# Patient Record
Sex: MALE | Race: WHITE | Employment: OTHER | URBAN - METROPOLITAN AREA
[De-identification: names, ages, dates, MRNs, and addresses within clinical notes are randomized per-mention and may not be internally consistent; named-entity substitution may affect disease eponyms.]

---

## 2017-01-03 ENCOUNTER — APPOINTMENT (OUTPATIENT)
Dept: LAB | Facility: HOSPITAL | Age: 72
End: 2017-01-03
Attending: INTERNAL MEDICINE
Payer: MEDICARE

## 2017-01-03 DIAGNOSIS — E78.6 LIPOPROTEIN DEFICIENCIES: ICD-10-CM

## 2017-01-03 DIAGNOSIS — I10 ESSENTIAL HYPERTENSION, MALIGNANT: ICD-10-CM

## 2017-01-03 DIAGNOSIS — R53.81 OTHER MALAISE AND FATIGUE: ICD-10-CM

## 2017-01-03 DIAGNOSIS — I48.91 ATRIAL FIBRILLATION (HCC): ICD-10-CM

## 2017-01-03 DIAGNOSIS — R53.83 OTHER MALAISE AND FATIGUE: ICD-10-CM

## 2017-01-03 DIAGNOSIS — Z85.46 PERSONAL HISTORY OF MALIGNANT NEOPLASM OF PROSTATE: ICD-10-CM

## 2017-01-03 DIAGNOSIS — E11.9 TYPE II OR UNSPECIFIED TYPE DIABETES MELLITUS WITHOUT MENTION OF COMPLICATION, NOT STATED AS UNCONTROLLED: ICD-10-CM

## 2017-01-03 LAB
INR BLD: 2 (ref 0.9–1.2)
PROTHROMBIN TIME: 22.1 SECONDS (ref 11.8–14.5)

## 2017-01-03 PROCEDURE — 85610 PROTHROMBIN TIME: CPT

## 2017-01-03 PROCEDURE — 36415 COLL VENOUS BLD VENIPUNCTURE: CPT

## 2017-02-06 ENCOUNTER — LAB ENCOUNTER (OUTPATIENT)
Dept: LAB | Facility: HOSPITAL | Age: 72
End: 2017-02-06
Attending: INTERNAL MEDICINE
Payer: MEDICARE

## 2017-02-06 DIAGNOSIS — I48.92 ATRIAL FLUTTER (HCC): ICD-10-CM

## 2017-02-06 DIAGNOSIS — I48.91 ATRIAL FIBRILLATION (HCC): Primary | ICD-10-CM

## 2017-02-06 LAB
INR BLD: 2 (ref 0.9–1.2)
PROTHROMBIN TIME: 22.5 SECONDS (ref 11.8–14.5)

## 2017-02-06 PROCEDURE — 36415 COLL VENOUS BLD VENIPUNCTURE: CPT

## 2017-02-06 PROCEDURE — 85610 PROTHROMBIN TIME: CPT

## 2017-02-28 ENCOUNTER — PRIOR ORIGINAL RECORDS (OUTPATIENT)
Dept: OTHER | Age: 72
End: 2017-02-28

## 2017-02-28 ENCOUNTER — APPOINTMENT (OUTPATIENT)
Dept: LAB | Facility: HOSPITAL | Age: 72
End: 2017-02-28
Attending: INTERNAL MEDICINE
Payer: MEDICARE

## 2017-02-28 DIAGNOSIS — I48.91 ATRIAL FIBRILLATION (HCC): ICD-10-CM

## 2017-02-28 DIAGNOSIS — Z85.46 PERSONAL HISTORY OF MALIGNANT NEOPLASM OF PROSTATE: ICD-10-CM

## 2017-02-28 DIAGNOSIS — R53.83 OTHER MALAISE AND FATIGUE: ICD-10-CM

## 2017-02-28 DIAGNOSIS — R53.81 OTHER MALAISE AND FATIGUE: ICD-10-CM

## 2017-02-28 DIAGNOSIS — E78.6 LIPOPROTEIN DEFICIENCY DISORDER: ICD-10-CM

## 2017-02-28 DIAGNOSIS — I10 ESSENTIAL HYPERTENSION, MALIGNANT: ICD-10-CM

## 2017-02-28 DIAGNOSIS — E11.9 TYPE II OR UNSPECIFIED TYPE DIABETES MELLITUS WITHOUT MENTION OF COMPLICATION, NOT STATED AS UNCONTROLLED: ICD-10-CM

## 2017-02-28 LAB
INR BLD: 2.2 (ref 0.9–1.2)
PROTHROMBIN TIME: 24.4 SECONDS (ref 11.8–14.5)

## 2017-02-28 PROCEDURE — 85610 PROTHROMBIN TIME: CPT

## 2017-02-28 PROCEDURE — 36415 COLL VENOUS BLD VENIPUNCTURE: CPT

## 2017-03-30 ENCOUNTER — LAB ENCOUNTER (OUTPATIENT)
Dept: LAB | Facility: HOSPITAL | Age: 72
End: 2017-03-30
Attending: UROLOGY
Payer: MEDICARE

## 2017-03-30 DIAGNOSIS — I48.91 ATRIAL FIBRILLATION (HCC): ICD-10-CM

## 2017-03-30 DIAGNOSIS — E11.9 TYPE II OR UNSPECIFIED TYPE DIABETES MELLITUS WITHOUT MENTION OF COMPLICATION, NOT STATED AS UNCONTROLLED: ICD-10-CM

## 2017-03-30 DIAGNOSIS — R53.81 OTHER MALAISE AND FATIGUE: ICD-10-CM

## 2017-03-30 DIAGNOSIS — I10 ESSENTIAL HYPERTENSION, MALIGNANT: ICD-10-CM

## 2017-03-30 DIAGNOSIS — R53.83 OTHER MALAISE AND FATIGUE: ICD-10-CM

## 2017-03-30 DIAGNOSIS — Z85.46 PERSONAL HISTORY OF MALIGNANT NEOPLASM OF PROSTATE: ICD-10-CM

## 2017-03-30 DIAGNOSIS — E78.6 LIPOPROTEIN DEFICIENCY DISORDER: ICD-10-CM

## 2017-03-30 DIAGNOSIS — C61 PROSTATE CANCER (HCC): Primary | ICD-10-CM

## 2017-03-30 PROCEDURE — 84153 ASSAY OF PSA TOTAL: CPT

## 2017-03-30 PROCEDURE — 85610 PROTHROMBIN TIME: CPT

## 2017-03-30 PROCEDURE — 36415 COLL VENOUS BLD VENIPUNCTURE: CPT

## 2017-05-04 ENCOUNTER — LAB ENCOUNTER (OUTPATIENT)
Dept: LAB | Facility: HOSPITAL | Age: 72
End: 2017-05-04
Attending: INTERNAL MEDICINE
Payer: MEDICARE

## 2017-05-04 DIAGNOSIS — I48.91 ATRIAL FIBRILLATION (HCC): Primary | ICD-10-CM

## 2017-05-04 PROCEDURE — 36415 COLL VENOUS BLD VENIPUNCTURE: CPT

## 2017-05-04 PROCEDURE — 85610 PROTHROMBIN TIME: CPT

## 2017-06-01 ENCOUNTER — APPOINTMENT (OUTPATIENT)
Dept: LAB | Facility: HOSPITAL | Age: 72
End: 2017-06-01
Attending: INTERNAL MEDICINE
Payer: MEDICARE

## 2017-06-01 ENCOUNTER — PRIOR ORIGINAL RECORDS (OUTPATIENT)
Dept: OTHER | Age: 72
End: 2017-06-01

## 2017-06-01 DIAGNOSIS — I10 ESSENTIAL HYPERTENSION: ICD-10-CM

## 2017-06-01 DIAGNOSIS — I48.91 ATRIAL FIBRILLATION (HCC): ICD-10-CM

## 2017-06-01 DIAGNOSIS — E13.9 DIABETES 1.5, MANAGED AS TYPE 2 (HCC): ICD-10-CM

## 2017-06-01 PROCEDURE — 80076 HEPATIC FUNCTION PANEL: CPT

## 2017-06-01 PROCEDURE — 82570 ASSAY OF URINE CREATININE: CPT

## 2017-06-01 PROCEDURE — 83036 HEMOGLOBIN GLYCOSYLATED A1C: CPT

## 2017-06-01 PROCEDURE — 80069 RENAL FUNCTION PANEL: CPT

## 2017-06-01 PROCEDURE — 85610 PROTHROMBIN TIME: CPT

## 2017-06-01 PROCEDURE — 80061 LIPID PANEL: CPT

## 2017-06-01 PROCEDURE — 82043 UR ALBUMIN QUANTITATIVE: CPT

## 2017-06-01 PROCEDURE — 36415 COLL VENOUS BLD VENIPUNCTURE: CPT

## 2017-06-05 ENCOUNTER — APPOINTMENT (OUTPATIENT)
Dept: CT IMAGING | Facility: HOSPITAL | Age: 72
End: 2017-06-05
Attending: EMERGENCY MEDICINE
Payer: MEDICARE

## 2017-06-05 ENCOUNTER — HOSPITAL ENCOUNTER (EMERGENCY)
Facility: HOSPITAL | Age: 72
Discharge: HOME | End: 2017-06-05
Attending: EMERGENCY MEDICINE
Payer: MEDICARE

## 2017-06-05 VITALS
HEIGHT: 68 IN | OXYGEN SATURATION: 96 % | WEIGHT: 230 LBS | BODY MASS INDEX: 34.86 KG/M2 | TEMPERATURE: 98 F | HEART RATE: 67 BPM | RESPIRATION RATE: 19 BRPM | DIASTOLIC BLOOD PRESSURE: 81 MMHG | SYSTOLIC BLOOD PRESSURE: 141 MMHG

## 2017-06-05 DIAGNOSIS — S09.90XA HEAD INJURY, INITIAL ENCOUNTER: Primary | ICD-10-CM

## 2017-06-05 DIAGNOSIS — S02.85XA ORBITAL FRACTURE, CLOSED, INITIAL ENCOUNTER (HCC): ICD-10-CM

## 2017-06-05 DIAGNOSIS — S02.92XA CLOSED FRACTURE OF FACIAL BONE, UNSPECIFIED FACIAL BONE, INITIAL ENCOUNTER (HCC): ICD-10-CM

## 2017-06-05 PROCEDURE — 99284 EMERGENCY DEPT VISIT MOD MDM: CPT

## 2017-06-05 PROCEDURE — 85610 PROTHROMBIN TIME: CPT | Performed by: EMERGENCY MEDICINE

## 2017-06-05 PROCEDURE — 70450 CT HEAD/BRAIN W/O DYE: CPT | Performed by: EMERGENCY MEDICINE

## 2017-06-05 PROCEDURE — 70486 CT MAXILLOFACIAL W/O DYE: CPT | Performed by: EMERGENCY MEDICINE

## 2017-06-05 RX ORDER — AMOXICILLIN AND CLAVULANATE POTASSIUM 875; 125 MG/1; MG/1
1 TABLET, FILM COATED ORAL 2 TIMES DAILY
Qty: 20 TABLET | Refills: 0 | Status: SHIPPED | OUTPATIENT
Start: 2017-06-05 | End: 2017-06-15

## 2017-06-05 RX ORDER — TRAMADOL HYDROCHLORIDE 50 MG/1
50 TABLET ORAL EVERY 8 HOURS PRN
Qty: 15 TABLET | Refills: 0 | Status: SHIPPED | OUTPATIENT
Start: 2017-06-05 | End: 2017-06-10

## 2017-06-05 RX ORDER — ACETAMINOPHEN 500 MG
1000 TABLET ORAL ONCE
Status: COMPLETED | OUTPATIENT
Start: 2017-06-05 | End: 2017-06-05

## 2017-06-05 RX ORDER — AMOXICILLIN AND CLAVULANATE POTASSIUM 875; 125 MG/1; MG/1
1 TABLET, FILM COATED ORAL ONCE
Status: COMPLETED | OUTPATIENT
Start: 2017-06-05 | End: 2017-06-05

## 2017-06-05 NOTE — ED PROVIDER NOTES
Patient Seen in: [facility] AND [facility] Emergency Department    History   Patient presents with:  Head Injury    Stated Complaint: Andres Do. Hit head.      HPI    74 yo M with PMH DM, HTN, afib on warfarin presenting for evaluation of left sided facial pain - inadve Family History   Problem Relation Age of Onset   • Breast Cancer Mother    • Heart Disease Mother      CAD   • Heart Disease Father      No h/o heart disease         Smoking Status: Former Smoker                   Packs/Day: 1.00  Years: 25        Types: Cooperative. Nursing note and vitals reviewed.         ED Course     Labs Reviewed   PROTHROMBIN TIME (PT) - Abnormal; Notable for the following:     PT 25.0 (*)     INR 2.3 (*)     All other components within normal limits     CT head  CT maxillofacial CTH nonacute, CTMF notable for facial fractures as noted - no clinical/radiographic evidence of ocular muscle entrapment or retro-orbital hematoma. Case d/w ophtho as noted and graciously agreeing to see in close followup within 48 hours.        Disposition

## 2017-06-05 NOTE — ED NOTES
Pt rolled out of bed while sleeping and hit his face on the the nightstand. Pt denies loc, walking with steady gait. Pt has bruising and swelling to left side of face and states he feels like something is fractured.  Pt has bleeding to left nare upon arriva

## 2017-06-05 NOTE — ED INITIAL ASSESSMENT (HPI)
Pt reports rolling out of bed appx 20 minutes pta, has lac to left of face with swelling, epistaxis from left nostril.

## 2017-07-10 ENCOUNTER — APPOINTMENT (OUTPATIENT)
Dept: LAB | Facility: HOSPITAL | Age: 72
End: 2017-07-10
Attending: INTERNAL MEDICINE
Payer: MEDICARE

## 2017-07-10 DIAGNOSIS — I48.91 ATRIAL FIBRILLATION (HCC): ICD-10-CM

## 2017-07-10 LAB
INR BLD: 2.3 (ref 0.9–1.2)
PROTHROMBIN TIME: 24.5 SECONDS (ref 11.8–14.5)

## 2017-07-10 PROCEDURE — 36415 COLL VENOUS BLD VENIPUNCTURE: CPT

## 2017-07-10 PROCEDURE — 85610 PROTHROMBIN TIME: CPT

## 2017-07-31 ENCOUNTER — APPOINTMENT (OUTPATIENT)
Dept: LAB | Facility: HOSPITAL | Age: 72
End: 2017-07-31
Attending: INTERNAL MEDICINE
Payer: MEDICARE

## 2017-07-31 DIAGNOSIS — I48.91 ATRIAL FIBRILLATION (HCC): ICD-10-CM

## 2017-07-31 LAB
INR BLD: 2.1 (ref 0.9–1.2)
PROTHROMBIN TIME: 23.5 SECONDS (ref 11.8–14.5)

## 2017-07-31 PROCEDURE — 36415 COLL VENOUS BLD VENIPUNCTURE: CPT

## 2017-07-31 PROCEDURE — 85610 PROTHROMBIN TIME: CPT

## 2017-08-05 ENCOUNTER — APPOINTMENT (OUTPATIENT)
Dept: CT IMAGING | Facility: HOSPITAL | Age: 72
End: 2017-08-05
Attending: EMERGENCY MEDICINE
Payer: MEDICARE

## 2017-08-05 ENCOUNTER — HOSPITAL ENCOUNTER (EMERGENCY)
Facility: HOSPITAL | Age: 72
Discharge: HOME | End: 2017-08-06
Attending: EMERGENCY MEDICINE
Payer: MEDICARE

## 2017-08-05 DIAGNOSIS — S76.212A GROIN STRAIN, LEFT, INITIAL ENCOUNTER: Primary | ICD-10-CM

## 2017-08-05 LAB
BILIRUB UR QL: NEGATIVE
CLARITY UR: CLEAR
COLOR UR: YELLOW
GLUCOSE UR-MCNC: NEGATIVE MG/DL
HGB UR QL STRIP.AUTO: NEGATIVE
KETONES UR-MCNC: 20 MG/DL
LEUKOCYTE ESTERASE UR QL STRIP.AUTO: NEGATIVE
NITRITE UR QL STRIP.AUTO: NEGATIVE
PH UR: 5 [PH] (ref 5–8)
PROT UR-MCNC: 30 MG/DL
RBC #/AREA URNS AUTO: 5 /HPF
SP GR UR STRIP: 1.02 (ref 1–1.03)
UROBILINOGEN UR STRIP-ACNC: <2
VIT C UR-MCNC: NEGATIVE MG/DL
WBC #/AREA URNS AUTO: <1 /HPF

## 2017-08-05 PROCEDURE — 74176 CT ABD & PELVIS W/O CONTRAST: CPT | Performed by: EMERGENCY MEDICINE

## 2017-08-05 PROCEDURE — 99284 EMERGENCY DEPT VISIT MOD MDM: CPT

## 2017-08-05 PROCEDURE — 96372 THER/PROPH/DIAG INJ SC/IM: CPT

## 2017-08-05 PROCEDURE — 81001 URINALYSIS AUTO W/SCOPE: CPT | Performed by: EMERGENCY MEDICINE

## 2017-08-05 RX ORDER — HYDROMORPHONE HYDROCHLORIDE 1 MG/ML
0.5 INJECTION, SOLUTION INTRAMUSCULAR; INTRAVENOUS; SUBCUTANEOUS ONCE
Status: COMPLETED | OUTPATIENT
Start: 2017-08-05 | End: 2017-08-05

## 2017-08-06 VITALS
HEIGHT: 67 IN | HEART RATE: 99 BPM | OXYGEN SATURATION: 96 % | TEMPERATURE: 98 F | RESPIRATION RATE: 18 BRPM | SYSTOLIC BLOOD PRESSURE: 141 MMHG | BODY MASS INDEX: 36.1 KG/M2 | DIASTOLIC BLOOD PRESSURE: 78 MMHG | WEIGHT: 230 LBS

## 2017-08-06 RX ORDER — HYDROCODONE BITARTRATE AND ACETAMINOPHEN 5; 325 MG/1; MG/1
1-2 TABLET ORAL EVERY 6 HOURS PRN
Qty: 20 TABLET | Refills: 0 | Status: SHIPPED | OUTPATIENT
Start: 2017-08-06 | End: 2017-08-13

## 2017-08-06 RX ORDER — DIAZEPAM 5 MG/1
5 TABLET ORAL EVERY 6 HOURS PRN
Qty: 20 TABLET | Refills: 0 | Status: SHIPPED | OUTPATIENT
Start: 2017-08-06 | End: 2017-08-08

## 2017-08-06 NOTE — ED INITIAL ASSESSMENT (HPI)
Pt reports atraumatic left groin pain that started Thursday. He describes the pain as \"constant\" and \"severe\". Saw his PMD on Friday.

## 2017-08-06 NOTE — ED PROVIDER NOTES
Patient Seen in: [facility] AND [facility] Emergency Department    History   Patient presents with:  Musculoskeletal Problem    Stated Complaint: Pain in L Groin x2 days    HPI    29-year-old male presents the emergency department with left groin pain that began mg by mouth daily. MetFORMIN HCl (GLUCOPHAGE) 500 MG Oral Tab,  Take 500 mg by mouth 2 (two) times daily with meals. Metoprolol Succinate ER (TOPROL XL) 50 MG Oral Tablet 24 Hr,  Take 50 mg by mouth daily.    aspirin 81 MG Oral Tab,  Take 81 mg by mouth equal, round, and reactive to light. Cardiovascular: Normal rate, regular rhythm, normal heart sounds and intact distal pulses. Pulmonary/Chest: Effort normal.   Abdominal: Soft. Bowel sounds are normal. He exhibits no distension and no mass.  There is Oral Tab  Take 1-2 tablets by mouth every 6 (six) hours as needed for Pain.   Qty: 20 tablet Refills: 0

## 2017-08-08 PROBLEM — R10.32 GROIN PAIN, LEFT: Status: ACTIVE | Noted: 2017-08-08

## 2017-08-08 PROBLEM — E11.9 DIABETES MELLITUS, STABLE (HCC): Status: ACTIVE | Noted: 2017-08-08

## 2017-08-08 PROBLEM — I48.0 PAROXYSMAL ATRIAL FIBRILLATION (HCC): Status: ACTIVE | Noted: 2017-08-08

## 2017-08-10 ENCOUNTER — HOSPITAL ENCOUNTER (OUTPATIENT)
Dept: GENERAL RADIOLOGY | Facility: HOSPITAL | Age: 72
Discharge: HOME | End: 2017-08-10
Attending: INTERNAL MEDICINE
Payer: MEDICARE

## 2017-08-10 DIAGNOSIS — R10.32 GROIN PAIN, LEFT: ICD-10-CM

## 2017-08-10 PROCEDURE — 72190 X-RAY EXAM OF PELVIS: CPT | Performed by: INTERNAL MEDICINE

## 2017-08-28 LAB
ALBUMIN: 4.1 G/DL
ALT (SGPT): 30 U/L
AST (SGOT): 32 U/L
BILIRUBIN TOTAL: 0.6 MG/DL
BUN: 15 MG/DL
CALCIUM: 9.7 MG/DL
CHLORIDE: 101 MEQ/L
CHOLESTEROL, TOTAL: 139 MG/DL
CREATININE, SERUM: 0.99 MG/DL
GLUCOSE: 144 MG/DL
GLUCOSE: 144 MG/DL
HDL CHOLESTEROL: 42 MG/DL
LDL CHOLESTEROL: 47 MG/DL
NON-HDL CHOLESTEROL: 97 MG/DL
POTASSIUM, SERUM: 3.6 MEQ/L
PROTEIN, TOTAL: 7.6 G/DL
SGOT (AST): 32 IU/L
SGPT (ALT): 30 IU/L
SODIUM: 140 MEQ/L
TRIGLYCERIDES: 250 MG/DL

## 2017-08-29 ENCOUNTER — PRIOR ORIGINAL RECORDS (OUTPATIENT)
Dept: OTHER | Age: 72
End: 2017-08-29

## 2017-08-31 ENCOUNTER — APPOINTMENT (OUTPATIENT)
Dept: LAB | Facility: HOSPITAL | Age: 72
End: 2017-08-31
Attending: INTERNAL MEDICINE
Payer: MEDICARE

## 2017-08-31 DIAGNOSIS — I48.91 ATRIAL FIBRILLATION (HCC): ICD-10-CM

## 2017-08-31 LAB
INR BLD: 2.4 (ref 0.9–1.2)
PROTHROMBIN TIME: 26 SECONDS (ref 11.8–14.5)

## 2017-08-31 PROCEDURE — 85610 PROTHROMBIN TIME: CPT

## 2017-08-31 PROCEDURE — 36415 COLL VENOUS BLD VENIPUNCTURE: CPT

## 2017-09-11 ENCOUNTER — PRIOR ORIGINAL RECORDS (OUTPATIENT)
Dept: OTHER | Age: 72
End: 2017-09-11

## 2017-09-11 ENCOUNTER — LAB ENCOUNTER (OUTPATIENT)
Dept: LAB | Facility: HOSPITAL | Age: 72
End: 2017-09-11
Attending: INTERNAL MEDICINE
Payer: MEDICARE

## 2017-09-11 DIAGNOSIS — I10 ESSENTIAL HYPERTENSION: ICD-10-CM

## 2017-09-11 DIAGNOSIS — E11.9 CONTROLLED TYPE 2 DIABETES MELLITUS WITHOUT COMPLICATION, WITHOUT LONG-TERM CURRENT USE OF INSULIN (HCC): ICD-10-CM

## 2017-09-11 LAB
ALBUMIN SERPL BCP-MCNC: 3.9 G/DL (ref 3.5–4.8)
ANION GAP SERPL CALC-SCNC: 8 MMOL/L (ref 0–18)
BUN SERPL-MCNC: 13 MG/DL (ref 8–20)
BUN/CREAT SERPL: 14.1 (ref 10–20)
CALCIUM SERPL-MCNC: 9.1 MG/DL (ref 8.5–10.5)
CHLORIDE SERPL-SCNC: 103 MMOL/L (ref 95–110)
CO2 SERPL-SCNC: 26 MMOL/L (ref 22–32)
CREAT SERPL-MCNC: 0.92 MG/DL (ref 0.5–1.5)
GLUCOSE SERPL-MCNC: 146 MG/DL (ref 70–99)
HBA1C MFR BLD: 5.9 % (ref 4–6)
OSMOLALITY UR CALC.SUM OF ELEC: 287 MOSM/KG (ref 275–295)
PHOSPHATE SERPL-MCNC: 2.3 MG/DL (ref 2.4–4.7)
POTASSIUM SERPL-SCNC: 3.3 MMOL/L (ref 3.3–5.1)
SODIUM SERPL-SCNC: 137 MMOL/L (ref 136–144)

## 2017-09-11 PROCEDURE — 83036 HEMOGLOBIN GLYCOSYLATED A1C: CPT

## 2017-09-11 PROCEDURE — 80069 RENAL FUNCTION PANEL: CPT

## 2017-09-11 PROCEDURE — 36415 COLL VENOUS BLD VENIPUNCTURE: CPT

## 2017-09-28 ENCOUNTER — LAB ENCOUNTER (OUTPATIENT)
Dept: LAB | Facility: HOSPITAL | Age: 72
End: 2017-09-28
Attending: UROLOGY
Payer: MEDICARE

## 2017-09-28 DIAGNOSIS — C61 MALIGNANT NEOPLASM OF PROSTATE (HCC): Primary | ICD-10-CM

## 2017-09-28 LAB — PSA SERPL-MCNC: 0.1 NG/ML (ref 0–4)

## 2017-09-28 PROCEDURE — 84153 ASSAY OF PSA TOTAL: CPT

## 2017-09-28 PROCEDURE — 36415 COLL VENOUS BLD VENIPUNCTURE: CPT

## 2017-10-30 ENCOUNTER — LAB ENCOUNTER (OUTPATIENT)
Dept: LAB | Facility: HOSPITAL | Age: 72
End: 2017-10-30
Attending: INTERNAL MEDICINE
Payer: MEDICARE

## 2017-10-30 DIAGNOSIS — I48.91 ATRIAL FIBRILLATION (HCC): ICD-10-CM

## 2017-10-30 DIAGNOSIS — M35.00 SICCA SYNDROME (HCC): ICD-10-CM

## 2017-10-30 PROCEDURE — 36415 COLL VENOUS BLD VENIPUNCTURE: CPT

## 2017-10-30 PROCEDURE — 85652 RBC SED RATE AUTOMATED: CPT

## 2017-10-30 PROCEDURE — 86140 C-REACTIVE PROTEIN: CPT

## 2017-10-30 PROCEDURE — 86039 ANTINUCLEAR ANTIBODIES (ANA): CPT

## 2017-10-30 PROCEDURE — 86038 ANTINUCLEAR ANTIBODIES: CPT

## 2017-10-30 PROCEDURE — 85610 PROTHROMBIN TIME: CPT

## 2017-11-03 ENCOUNTER — PRIOR ORIGINAL RECORDS (OUTPATIENT)
Dept: OTHER | Age: 72
End: 2017-11-03

## 2017-11-30 ENCOUNTER — APPOINTMENT (OUTPATIENT)
Dept: LAB | Facility: HOSPITAL | Age: 72
End: 2017-11-30
Attending: INTERNAL MEDICINE
Payer: MEDICARE

## 2017-11-30 DIAGNOSIS — I48.91 ATRIAL FIBRILLATION (HCC): ICD-10-CM

## 2017-11-30 PROCEDURE — 85610 PROTHROMBIN TIME: CPT

## 2017-11-30 PROCEDURE — 36415 COLL VENOUS BLD VENIPUNCTURE: CPT

## 2017-12-05 PROBLEM — M35.00 SJOGREN'S SYNDROME, WITH UNSPECIFIED ORGAN INVOLVEMENT (HCC): Status: ACTIVE | Noted: 2017-12-05

## 2017-12-28 ENCOUNTER — APPOINTMENT (OUTPATIENT)
Dept: LAB | Facility: HOSPITAL | Age: 72
End: 2017-12-28
Attending: INTERNAL MEDICINE
Payer: MEDICARE

## 2017-12-28 DIAGNOSIS — I48.91 ATRIAL FIBRILLATION (HCC): ICD-10-CM

## 2017-12-28 LAB
INR BLD: 2.7 (ref 0.9–1.2)
PROTHROMBIN TIME: 28.1 SECONDS (ref 11.8–14.5)

## 2017-12-28 PROCEDURE — 85610 PROTHROMBIN TIME: CPT

## 2017-12-28 PROCEDURE — 36415 COLL VENOUS BLD VENIPUNCTURE: CPT

## 2018-01-25 ENCOUNTER — APPOINTMENT (OUTPATIENT)
Dept: LAB | Facility: HOSPITAL | Age: 73
End: 2018-01-25
Attending: INTERNAL MEDICINE
Payer: MEDICARE

## 2018-01-25 DIAGNOSIS — I48.91 ATRIAL FIBRILLATION (HCC): ICD-10-CM

## 2018-01-25 LAB
INR BLD: 2.4 (ref 0.9–1.2)
PROTHROMBIN TIME: 26 SECONDS (ref 11.8–14.5)

## 2018-01-25 PROCEDURE — 85610 PROTHROMBIN TIME: CPT

## 2018-01-25 PROCEDURE — 36415 COLL VENOUS BLD VENIPUNCTURE: CPT

## 2018-02-22 ENCOUNTER — APPOINTMENT (OUTPATIENT)
Dept: LAB | Facility: HOSPITAL | Age: 73
End: 2018-02-22
Attending: INTERNAL MEDICINE
Payer: MEDICARE

## 2018-02-22 DIAGNOSIS — I48.91 ATRIAL FIBRILLATION (HCC): ICD-10-CM

## 2018-02-22 LAB
ALBUMIN: 3.9 G/DL
BUN: 13 MG/DL
CALCIUM: 9.1 MG/DL
CHLORIDE: 103 MEQ/L
CREATININE, SERUM: 0.92 MG/DL
GLUCOSE: 146 MG/DL
HEMOGLOBIN A1C: 5.9 %
INR BLD: 1.7 (ref 0.9–1.2)
POTASSIUM, SERUM: 3.3 MEQ/L
PROTHROMBIN TIME: 19.3 SECONDS (ref 11.8–14.5)
SODIUM: 137 MEQ/L

## 2018-02-22 PROCEDURE — 85610 PROTHROMBIN TIME: CPT

## 2018-02-22 PROCEDURE — 36415 COLL VENOUS BLD VENIPUNCTURE: CPT

## 2018-02-27 ENCOUNTER — PRIOR ORIGINAL RECORDS (OUTPATIENT)
Dept: OTHER | Age: 73
End: 2018-02-27

## 2018-03-12 ENCOUNTER — APPOINTMENT (OUTPATIENT)
Dept: LAB | Facility: HOSPITAL | Age: 73
End: 2018-03-12
Attending: INTERNAL MEDICINE
Payer: MEDICARE

## 2018-03-12 ENCOUNTER — PRIOR ORIGINAL RECORDS (OUTPATIENT)
Dept: OTHER | Age: 73
End: 2018-03-12

## 2018-03-12 DIAGNOSIS — E11.9 DIABETES MELLITUS, STABLE (HCC): ICD-10-CM

## 2018-03-12 DIAGNOSIS — E78.00 HIGH CHOLESTEROL: ICD-10-CM

## 2018-03-12 DIAGNOSIS — I48.91 ATRIAL FIBRILLATION (HCC): ICD-10-CM

## 2018-03-12 LAB
ALBUMIN SERPL BCP-MCNC: 4.1 G/DL (ref 3.5–4.8)
ALBUMIN SERPL BCP-MCNC: 4.1 G/DL (ref 3.5–4.8)
ALP SERPL-CCNC: 40 U/L (ref 32–100)
ALT SERPL-CCNC: 51 U/L (ref 17–63)
ANION GAP SERPL CALC-SCNC: 10 MMOL/L (ref 0–18)
AST SERPL-CCNC: 70 U/L (ref 15–41)
BILIRUB DIRECT SERPL-MCNC: 0.1 MG/DL (ref 0–0.2)
BILIRUB SERPL-MCNC: 0.6 MG/DL (ref 0.3–1.2)
BUN SERPL-MCNC: 13 MG/DL (ref 8–20)
BUN/CREAT SERPL: 15.1 (ref 10–20)
CALCIUM SERPL-MCNC: 9.3 MG/DL (ref 8.5–10.5)
CHLORIDE SERPL-SCNC: 103 MMOL/L (ref 95–110)
CHOLEST SERPL-MCNC: 146 MG/DL (ref 110–200)
CO2 SERPL-SCNC: 25 MMOL/L (ref 22–32)
CREAT SERPL-MCNC: 0.86 MG/DL (ref 0.5–1.5)
CREAT UR-MCNC: 179.7 MG/DL
GLUCOSE SERPL-MCNC: 130 MG/DL (ref 70–99)
HDLC SERPL-MCNC: 46 MG/DL
INR BLD: 2.6 (ref 0.9–1.2)
LDLC SERPL CALC-MCNC: 66 MG/DL (ref 0–99)
MICROALBUMIN UR-MCNC: 2.4 MG/DL (ref 0–1.8)
MICROALBUMIN/CREAT UR: 13.4 MG/G{CREAT} (ref 0–20)
NONHDLC SERPL-MCNC: 100 MG/DL
OSMOLALITY UR CALC.SUM OF ELEC: 288 MOSM/KG (ref 275–295)
PHOSPHATE SERPL-MCNC: 3.2 MG/DL (ref 2.4–4.7)
POTASSIUM SERPL-SCNC: 3.3 MMOL/L (ref 3.3–5.1)
PROT SERPL-MCNC: 7.5 G/DL (ref 5.9–8.4)
PROTHROMBIN TIME: 27.2 SECONDS (ref 11.8–14.5)
SODIUM SERPL-SCNC: 138 MMOL/L (ref 136–144)
TRIGL SERPL-MCNC: 168 MG/DL (ref 1–149)

## 2018-03-12 PROCEDURE — 36415 COLL VENOUS BLD VENIPUNCTURE: CPT

## 2018-03-12 PROCEDURE — 85610 PROTHROMBIN TIME: CPT

## 2018-03-12 PROCEDURE — 80076 HEPATIC FUNCTION PANEL: CPT

## 2018-03-12 PROCEDURE — 80061 LIPID PANEL: CPT

## 2018-03-12 PROCEDURE — 82043 UR ALBUMIN QUANTITATIVE: CPT

## 2018-03-12 PROCEDURE — 83036 HEMOGLOBIN GLYCOSYLATED A1C: CPT

## 2018-03-12 PROCEDURE — 80069 RENAL FUNCTION PANEL: CPT

## 2018-03-12 PROCEDURE — 82570 ASSAY OF URINE CREATININE: CPT

## 2018-03-13 LAB — HBA1C MFR BLD: 5.9 % (ref 4–6)

## 2018-03-20 ENCOUNTER — APPOINTMENT (OUTPATIENT)
Dept: OTHER | Facility: HOSPITAL | Age: 73
End: 2018-03-20
Attending: ORTHOPAEDIC SURGERY

## 2018-04-12 ENCOUNTER — APPOINTMENT (OUTPATIENT)
Dept: LAB | Facility: HOSPITAL | Age: 73
End: 2018-04-12
Attending: INTERNAL MEDICINE
Payer: MEDICARE

## 2018-04-12 DIAGNOSIS — I48.91 ATRIAL FIBRILLATION (HCC): ICD-10-CM

## 2018-04-12 PROCEDURE — 85610 PROTHROMBIN TIME: CPT

## 2018-04-12 PROCEDURE — 36415 COLL VENOUS BLD VENIPUNCTURE: CPT

## 2018-04-19 ENCOUNTER — HOSPITAL ENCOUNTER (OUTPATIENT)
Dept: GENERAL RADIOLOGY | Age: 73
Discharge: HOME | End: 2018-04-19
Attending: INTERNAL MEDICINE
Payer: MEDICARE

## 2018-04-19 DIAGNOSIS — R05.9 COUGH IN ADULT: ICD-10-CM

## 2018-04-19 PROCEDURE — 71046 X-RAY EXAM CHEST 2 VIEWS: CPT | Performed by: INTERNAL MEDICINE

## 2018-05-10 ENCOUNTER — LAB ENCOUNTER (OUTPATIENT)
Dept: LAB | Facility: HOSPITAL | Age: 73
End: 2018-05-10
Attending: INTERNAL MEDICINE
Payer: MEDICARE

## 2018-05-10 DIAGNOSIS — I48.91 A-FIB (HCC): Primary | ICD-10-CM

## 2018-05-10 PROCEDURE — 85610 PROTHROMBIN TIME: CPT

## 2018-05-10 PROCEDURE — 36415 COLL VENOUS BLD VENIPUNCTURE: CPT

## 2018-05-24 ENCOUNTER — APPOINTMENT (OUTPATIENT)
Dept: LAB | Facility: HOSPITAL | Age: 73
End: 2018-05-24
Attending: INTERNAL MEDICINE
Payer: MEDICARE

## 2018-05-24 DIAGNOSIS — I48.91 A-FIB (HCC): ICD-10-CM

## 2018-05-24 PROCEDURE — 85610 PROTHROMBIN TIME: CPT

## 2018-05-24 PROCEDURE — 36415 COLL VENOUS BLD VENIPUNCTURE: CPT

## 2018-06-18 ENCOUNTER — APPOINTMENT (OUTPATIENT)
Dept: LAB | Facility: HOSPITAL | Age: 73
End: 2018-06-18
Attending: INTERNAL MEDICINE
Payer: MEDICARE

## 2018-06-18 DIAGNOSIS — I48.91 A-FIB (HCC): ICD-10-CM

## 2018-06-18 PROCEDURE — 36415 COLL VENOUS BLD VENIPUNCTURE: CPT

## 2018-06-18 PROCEDURE — 85610 PROTHROMBIN TIME: CPT

## 2018-07-12 ENCOUNTER — LAB ENCOUNTER (OUTPATIENT)
Dept: LAB | Facility: HOSPITAL | Age: 73
End: 2018-07-12
Attending: INTERNAL MEDICINE
Payer: MEDICARE

## 2018-07-12 DIAGNOSIS — E11.9 DIABETES MELLITUS, STABLE (HCC): ICD-10-CM

## 2018-07-12 DIAGNOSIS — M35.00 SJOGREN'S SYNDROME, WITH UNSPECIFIED ORGAN INVOLVEMENT (HCC): ICD-10-CM

## 2018-07-12 DIAGNOSIS — I48.91 A-FIB (HCC): ICD-10-CM

## 2018-07-12 LAB
INR BLD: 2.7 (ref 0.9–1.2)
PROTHROMBIN TIME: 28.2 SECONDS (ref 11.8–14.5)

## 2018-07-12 PROCEDURE — 85610 PROTHROMBIN TIME: CPT

## 2018-07-12 PROCEDURE — 86235 NUCLEAR ANTIGEN ANTIBODY: CPT

## 2018-07-12 PROCEDURE — 83036 HEMOGLOBIN GLYCOSYLATED A1C: CPT

## 2018-07-12 PROCEDURE — 86039 ANTINUCLEAR ANTIBODIES (ANA): CPT

## 2018-07-12 PROCEDURE — 36415 COLL VENOUS BLD VENIPUNCTURE: CPT

## 2018-07-12 PROCEDURE — 86038 ANTINUCLEAR ANTIBODIES: CPT

## 2018-07-12 PROCEDURE — 86225 DNA ANTIBODY NATIVE: CPT

## 2018-07-13 LAB — HBA1C MFR BLD: 6 % (ref 4–6)

## 2018-07-16 LAB — NUCLEAR IGG TITR SER IF: POSITIVE {TITER}

## 2018-07-17 LAB
DSDNA AB TITR SER: <10 {TITER}
ENA SM IGG SER QL: NEGATIVE
ENA SM+RNP AB SER QL: NEGATIVE
ENA SS-A AB SER QL IA: NEGATIVE
ENA SS-B AB SER QL IA: NEGATIVE

## 2018-07-18 ENCOUNTER — PRIOR ORIGINAL RECORDS (OUTPATIENT)
Dept: OTHER | Age: 73
End: 2018-07-18

## 2018-07-18 LAB — ANA NUCLEOLAR TITR SER IF: 80 {TITER}

## 2018-07-31 LAB
CHOLESTEROL, TOTAL: 146 MG/DL
HDL CHOLESTEROL: 46 MG/DL
HEMOGLOBIN A1C: 6 %
LDL CHOLESTEROL: 66 MG/DL
NON-HDL CHOLESTEROL: 100 MG/DL
TRIGLYCERIDES: 168 MG/DL

## 2018-08-02 ENCOUNTER — APPOINTMENT (OUTPATIENT)
Dept: LAB | Facility: HOSPITAL | Age: 73
End: 2018-08-02
Attending: INTERNAL MEDICINE
Payer: MEDICARE

## 2018-08-02 DIAGNOSIS — I48.91 A-FIB (HCC): ICD-10-CM

## 2018-08-02 LAB
INR BLD: 2.5 (ref 0.9–1.2)
PROTHROMBIN TIME: 26 SECONDS (ref 11.8–14.5)

## 2018-08-02 PROCEDURE — 36415 COLL VENOUS BLD VENIPUNCTURE: CPT

## 2018-08-02 PROCEDURE — 85610 PROTHROMBIN TIME: CPT

## 2018-08-07 ENCOUNTER — MYAURORA ACCOUNT LINK (OUTPATIENT)
Dept: OTHER | Age: 73
End: 2018-08-07

## 2018-08-07 ENCOUNTER — PRIOR ORIGINAL RECORDS (OUTPATIENT)
Dept: OTHER | Age: 73
End: 2018-08-07

## 2018-08-30 ENCOUNTER — APPOINTMENT (OUTPATIENT)
Dept: LAB | Facility: HOSPITAL | Age: 73
End: 2018-08-30
Attending: INTERNAL MEDICINE
Payer: MEDICARE

## 2018-08-30 DIAGNOSIS — I48.91 A-FIB (HCC): ICD-10-CM

## 2018-08-30 LAB
INR BLD: 2.8 (ref 0.9–1.2)
PROTHROMBIN TIME: 28.5 SECONDS (ref 11.8–14.5)

## 2018-08-30 PROCEDURE — 85610 PROTHROMBIN TIME: CPT

## 2018-08-30 PROCEDURE — 36415 COLL VENOUS BLD VENIPUNCTURE: CPT

## 2018-09-24 ENCOUNTER — APPOINTMENT (OUTPATIENT)
Dept: LAB | Facility: HOSPITAL | Age: 73
End: 2018-09-24
Attending: INTERNAL MEDICINE
Payer: MEDICARE

## 2018-09-24 ENCOUNTER — PRIOR ORIGINAL RECORDS (OUTPATIENT)
Dept: OTHER | Age: 73
End: 2018-09-24

## 2018-09-24 DIAGNOSIS — I48.91 A-FIB (HCC): ICD-10-CM

## 2018-09-24 LAB
INR BLD: 2.9 (ref 0.9–1.2)
INR: 2.9
PROTHROMBIN TIME: 29.4 SECONDS (ref 11.8–14.5)

## 2018-09-24 PROCEDURE — 85610 PROTHROMBIN TIME: CPT

## 2018-09-24 PROCEDURE — 36415 COLL VENOUS BLD VENIPUNCTURE: CPT

## 2018-10-01 ENCOUNTER — PRIOR ORIGINAL RECORDS (OUTPATIENT)
Dept: OTHER | Age: 73
End: 2018-10-01

## 2018-10-01 ENCOUNTER — LAB ENCOUNTER (OUTPATIENT)
Dept: LAB | Facility: HOSPITAL | Age: 73
End: 2018-10-01
Attending: UROLOGY
Payer: MEDICARE

## 2018-10-01 DIAGNOSIS — E11.9 DIABETES MELLITUS, STABLE (HCC): ICD-10-CM

## 2018-10-01 DIAGNOSIS — C61 MALIGNANT NEOPLASM OF PROSTATE (HCC): Primary | ICD-10-CM

## 2018-10-01 DIAGNOSIS — I48.0 PAROXYSMAL ATRIAL FIBRILLATION (HCC): ICD-10-CM

## 2018-10-01 DIAGNOSIS — G25.0 ESSENTIAL TREMOR: ICD-10-CM

## 2018-10-01 DIAGNOSIS — E78.00 HIGH CHOLESTEROL: ICD-10-CM

## 2018-10-01 PROCEDURE — 80069 RENAL FUNCTION PANEL: CPT

## 2018-10-01 PROCEDURE — 84153 ASSAY OF PSA TOTAL: CPT

## 2018-10-01 PROCEDURE — 84443 ASSAY THYROID STIM HORMONE: CPT

## 2018-10-01 PROCEDURE — 83036 HEMOGLOBIN GLYCOSYLATED A1C: CPT

## 2018-10-01 PROCEDURE — 80061 LIPID PANEL: CPT

## 2018-10-01 PROCEDURE — 80076 HEPATIC FUNCTION PANEL: CPT

## 2018-10-01 PROCEDURE — 36415 COLL VENOUS BLD VENIPUNCTURE: CPT

## 2018-10-31 ENCOUNTER — PRIOR ORIGINAL RECORDS (OUTPATIENT)
Dept: OTHER | Age: 73
End: 2018-10-31

## 2018-10-31 LAB — INR: 0

## 2018-11-08 ENCOUNTER — APPOINTMENT (OUTPATIENT)
Dept: LAB | Facility: HOSPITAL | Age: 73
End: 2018-11-08
Attending: INTERNAL MEDICINE
Payer: MEDICARE

## 2018-11-08 ENCOUNTER — PRIOR ORIGINAL RECORDS (OUTPATIENT)
Dept: OTHER | Age: 73
End: 2018-11-08

## 2018-11-08 DIAGNOSIS — I48.91 A-FIB (HCC): ICD-10-CM

## 2018-11-08 LAB — INR: 2.5

## 2018-11-08 PROCEDURE — 36415 COLL VENOUS BLD VENIPUNCTURE: CPT

## 2018-11-08 PROCEDURE — 85610 PROTHROMBIN TIME: CPT

## 2018-12-06 ENCOUNTER — APPOINTMENT (OUTPATIENT)
Dept: LAB | Facility: HOSPITAL | Age: 73
End: 2018-12-06
Attending: INTERNAL MEDICINE
Payer: MEDICARE

## 2018-12-06 DIAGNOSIS — I48.91 A-FIB (HCC): ICD-10-CM

## 2018-12-06 PROCEDURE — 36415 COLL VENOUS BLD VENIPUNCTURE: CPT

## 2018-12-06 PROCEDURE — 85610 PROTHROMBIN TIME: CPT

## 2018-12-07 ENCOUNTER — PRIOR ORIGINAL RECORDS (OUTPATIENT)
Dept: OTHER | Age: 73
End: 2018-12-07

## 2018-12-07 LAB — INR: 2.3

## 2018-12-10 ENCOUNTER — TELEPHONE (OUTPATIENT)
Dept: NEUROLOGY | Facility: CLINIC | Age: 73
End: 2018-12-10

## 2018-12-10 ENCOUNTER — OFFICE VISIT (OUTPATIENT)
Dept: NEUROLOGY | Facility: CLINIC | Age: 73
End: 2018-12-10
Payer: MEDICARE

## 2018-12-10 VITALS
DIASTOLIC BLOOD PRESSURE: 64 MMHG | HEIGHT: 67 IN | WEIGHT: 235 LBS | SYSTOLIC BLOOD PRESSURE: 116 MMHG | BODY MASS INDEX: 36.88 KG/M2 | HEART RATE: 68 BPM

## 2018-12-10 DIAGNOSIS — M50.20 CERVICAL DISC HERNIATION: ICD-10-CM

## 2018-12-10 DIAGNOSIS — M48.02 FORAMINAL STENOSIS OF CERVICAL REGION: ICD-10-CM

## 2018-12-10 DIAGNOSIS — M48.02 CERVICAL SPINAL STENOSIS: ICD-10-CM

## 2018-12-10 DIAGNOSIS — M54.12 CERVICAL RADICULOPATHY AT C6: Primary | ICD-10-CM

## 2018-12-10 DIAGNOSIS — M54.2 NECK PAIN ON RIGHT SIDE: ICD-10-CM

## 2018-12-10 DIAGNOSIS — M50.90 CERVICAL DISC DISEASE: ICD-10-CM

## 2018-12-10 PROCEDURE — 99215 OFFICE O/P EST HI 40 MIN: CPT | Performed by: PHYSICAL MEDICINE & REHABILITATION

## 2018-12-10 RX ORDER — GABAPENTIN 100 MG/1
100 CAPSULE ORAL 3 TIMES DAILY
Qty: 90 CAPSULE | Refills: 5 | Status: SHIPPED | OUTPATIENT
Start: 2018-12-10 | End: 2019-04-05 | Stop reason: ALTCHOICE

## 2018-12-10 NOTE — TELEPHONE ENCOUNTER
Spoke to patient, will check with cardiology  when can hold coumadin and aspirin. Will then call office back to schedule. Will hold metformin at time of injection until cleared by Dr Kathie Harrell.

## 2018-12-10 NOTE — TELEPHONE ENCOUNTER
Medicare Online for insurance coverage of right C7 TFESI cpt codes 90054, [id number]. Insurance was verified and procedure is a covered benefit and does not require authorization. Will inform Nursing.

## 2018-12-10 NOTE — PROGRESS NOTES
Patient: Skip Molina  Medical Record Number: [id number]    Referring Physician: PCP Dr Thang Waite: Skip Molina is a left  handed  72year old male, who complains of right sided neck on the lateral aspect of neck and radiates to t daily. Disp:  Rfl:    MetFORMIN HCl (GLUCOPHAGE) 500 MG Oral Tab Take 500 mg by mouth 2 (two) times daily with meals. Disp:  Rfl:    Metoprolol Succinate ER (TOPROL XL) 50 MG Oral Tablet 24 Hr Take 50 mg by mouth daily.  Disp:  Rfl:    aspirin 81 MG Oral Ta Cardiovascular: Negative for chest pain, palpitations and orthopnea. Has CPAP for DILLON     Gastrointestinal: Negative for abdominal pain, blood in stool, constipation, diarrhea, heartburn, nausea and vomiting.    Genitourinary: Negative for dysuria, ulcerations. LYMPH EXAM: There is no lymph edema in either lower extremity. VASCULAR EXAM: Pedal pulses are normal bilaterally, with good distal perfusion. No clubbing or cyanosis. ABDOMINAL EXAM: Abdomen is soft without masses palpated.   LUNGS: no labo bilateral mod-severe, C5-6 bilateral mod foraminal stenosis    4.  C4-5 mild-mod central HNP    5. C2-3 mild central, C3-4 mild-mod diffuse, C4-5 left mild foraminal, C5-6 mod-large central, C6-7 right mod far lateral & left mod foraminal bulging discs    6

## 2018-12-10 NOTE — PATIENT INSTRUCTIONS
RECOMMENDATIONS:   He will start PT on the cervical spine. I will do a right C7 TFESI. The risks of spinal cord injury and stroke were discussed with the patient.     He will trial Neurontin for the pain and will continue with the UItram.    The ashleye

## 2018-12-11 ENCOUNTER — PATIENT MESSAGE (OUTPATIENT)
Dept: NEUROLOGY | Facility: CLINIC | Age: 73
End: 2018-12-11

## 2018-12-11 DIAGNOSIS — M54.12 CERVICAL RADICULOPATHY AT C6: Primary | ICD-10-CM

## 2018-12-12 NOTE — PROGRESS NOTES
Patient has been scheduled for a right C7(C6-7)transforaminal epidural steroid injections  on 1/4/2019 at the [facility]*. Medications and allergies reviewed.  Patient informed to hold aspirins, nsaids, blood thinners, vitamins and fish oils 3-7 days prior to p

## 2018-12-12 NOTE — TELEPHONE ENCOUNTER
From: Genesis Gustafson  To: Willis Malik MD  Sent: 12/11/2018 11:01 PM CST  Subject: Other    Waiting to speak to someone regarding the scheduling of a procedure. I have all the info that is required but no one has contacted me as of today.  I called several

## 2018-12-13 ENCOUNTER — APPOINTMENT (OUTPATIENT)
Dept: PHYSICAL THERAPY | Facility: HOSPITAL | Age: 73
End: 2018-12-13
Attending: PHYSICAL MEDICINE & REHABILITATION
Payer: MEDICARE

## 2018-12-14 ENCOUNTER — TELEPHONE (OUTPATIENT)
Dept: NEUROLOGY | Facility: CLINIC | Age: 73
End: 2018-12-14

## 2018-12-21 ENCOUNTER — APPOINTMENT (OUTPATIENT)
Dept: PHYSICAL THERAPY | Facility: HOSPITAL | Age: 73
End: 2018-12-21
Attending: PHYSICAL MEDICINE & REHABILITATION
Payer: MEDICARE

## 2019-01-02 NOTE — PROGRESS NOTES
Spoke to Estephania Stark at [facility] who explained pt needs an order for day-of procedure INR check. Order placed.

## 2019-01-04 ENCOUNTER — OFFICE VISIT (OUTPATIENT)
Dept: SURGERY | Facility: CLINIC | Age: 74
End: 2019-01-04
Payer: MEDICARE

## 2019-01-04 ENCOUNTER — APPOINTMENT (OUTPATIENT)
Dept: LAB | Facility: HOSPITAL | Age: 74
End: 2019-01-04
Attending: PHYSICAL MEDICINE & REHABILITATION
Payer: MEDICARE

## 2019-01-04 DIAGNOSIS — M48.02 CERVICAL SPINAL STENOSIS: ICD-10-CM

## 2019-01-04 DIAGNOSIS — M54.12 CERVICAL RADICULOPATHY AT C6: Primary | ICD-10-CM

## 2019-01-04 DIAGNOSIS — M50.90 CERVICAL DISC DISEASE: ICD-10-CM

## 2019-01-04 LAB
INR BLD: 1.1 (ref 0.9–1.2)
PROTHROMBIN TIME: 14 SECONDS (ref 11.8–14.5)

## 2019-01-04 PROCEDURE — 36415 COLL VENOUS BLD VENIPUNCTURE: CPT | Performed by: PHYSICAL MEDICINE & REHABILITATION

## 2019-01-04 PROCEDURE — 85610 PROTHROMBIN TIME: CPT | Performed by: PHYSICAL MEDICINE & REHABILITATION

## 2019-01-04 PROCEDURE — 64479 NJX AA&/STRD TFRM EPI C/T 1: CPT | Performed by: PHYSICAL MEDICINE & REHABILITATION

## 2019-01-04 NOTE — PROCEDURES
[address].    CERVICAL TRANSFORAMINAL  NAME:  Skip Mention    MR #:    [id number] :  10/6/1945     PHYSICIAN:  Terese Goldman            Operative Report    DATE OF PROCEDURE: 2019   PREOPERATIVE DIAGNOSES: 1. right C7 radi was injected with a 0.5 cc of 2% PF lidocaine without epinephrine. No adverse reaction was seen. Then, aspiration was again performed. No blood, fluid, or air was aspirated.   Then, the patient was injected with 1 cc of 10 mg of preservative-free dexamet

## 2019-01-08 ENCOUNTER — APPOINTMENT (OUTPATIENT)
Dept: LAB | Age: 74
End: 2019-01-08
Attending: INTERNAL MEDICINE
Payer: MEDICARE

## 2019-01-08 ENCOUNTER — PRIOR ORIGINAL RECORDS (OUTPATIENT)
Dept: OTHER | Age: 74
End: 2019-01-08

## 2019-01-08 ENCOUNTER — LAB ENCOUNTER (OUTPATIENT)
Dept: LAB | Age: 74
End: 2019-01-08
Attending: INTERNAL MEDICINE
Payer: MEDICARE

## 2019-01-08 DIAGNOSIS — I10 ESSENTIAL HYPERTENSION: ICD-10-CM

## 2019-01-08 DIAGNOSIS — R19.7 DIARRHEA OF PRESUMED INFECTIOUS ORIGIN: ICD-10-CM

## 2019-01-08 DIAGNOSIS — B34.9 ACUTE VIRAL SYNDROME: ICD-10-CM

## 2019-01-08 DIAGNOSIS — I48.0 PAROXYSMAL ATRIAL FIBRILLATION (HCC): ICD-10-CM

## 2019-01-08 LAB
ALBUMIN SERPL BCP-MCNC: 3.3 G/DL (ref 3.5–4.8)
ANION GAP SERPL CALC-SCNC: 11 MMOL/L (ref 0–18)
BASOPHILS # BLD: 0 K/UL (ref 0–0.2)
BASOPHILS NFR BLD: 0 %
BUN SERPL-MCNC: 18 MG/DL (ref 8–20)
BUN/CREAT SERPL: 21.4 (ref 10–20)
CALCIUM SERPL-MCNC: 8.3 MG/DL (ref 8.5–10.5)
CHLORIDE SERPL-SCNC: 106 MMOL/L (ref 95–110)
CO2 SERPL-SCNC: 20 MMOL/L (ref 22–32)
CREAT SERPL-MCNC: 0.84 MG/DL (ref 0.5–1.5)
EOSINOPHIL # BLD: 0.2 K/UL (ref 0–0.7)
EOSINOPHIL NFR BLD: 3 %
ERYTHROCYTE [DISTWIDTH] IN BLOOD BY AUTOMATED COUNT: 15.4 % (ref 11–15)
GLUCOSE SERPL-MCNC: 125 MG/DL (ref 70–99)
HCT VFR BLD AUTO: 42.6 % (ref 41–52)
HGB BLD-MCNC: 14.3 G/DL (ref 13.5–17.5)
INR BLD: 1.3 (ref 0.9–1.2)
INR: 1.3
LYMPHOCYTES # BLD: 1.1 K/UL (ref 1–4)
LYMPHOCYTES NFR BLD: 22 %
MCH RBC QN AUTO: 31.7 PG (ref 27–32)
MCHC RBC AUTO-ENTMCNC: 33.6 G/DL (ref 32–37)
MCV RBC AUTO: 94.5 FL (ref 80–100)
MONOCYTES # BLD: 0.5 K/UL (ref 0–1)
MONOCYTES NFR BLD: 10 %
NEUTROPHILS # BLD AUTO: 3.5 K/UL (ref 1.8–7.7)
NEUTROPHILS NFR BLD: 66 %
OSMOLALITY UR CALC.SUM OF ELEC: 287 MOSM/KG (ref 275–295)
PHOSPHATE SERPL-MCNC: 1.8 MG/DL (ref 2.4–4.7)
PLATELET # BLD AUTO: 188 K/UL (ref 140–400)
PMV BLD AUTO: 8.9 FL (ref 7.4–10.3)
POTASSIUM SERPL-SCNC: 3.1 MMOL/L (ref 3.3–5.1)
PROTHROMBIN TIME: 16 SECONDS (ref 11.8–14.5)
RBC # BLD AUTO: 4.5 M/UL (ref 4.5–5.9)
SODIUM SERPL-SCNC: 137 MMOL/L (ref 136–144)
WBC # BLD AUTO: 5.4 K/UL (ref 4–11)

## 2019-01-08 PROCEDURE — 80069 RENAL FUNCTION PANEL: CPT

## 2019-01-08 PROCEDURE — 87507 IADNA-DNA/RNA PROBE TQ 12-25: CPT

## 2019-01-08 PROCEDURE — 85025 COMPLETE CBC W/AUTO DIFF WBC: CPT

## 2019-01-08 PROCEDURE — 85610 PROTHROMBIN TIME: CPT

## 2019-01-08 PROCEDURE — 36415 COLL VENOUS BLD VENIPUNCTURE: CPT

## 2019-01-09 LAB
ADENOVIRUS F 40/41 PCR: NEGATIVE
ASTROVIRUS PCR: NEGATIVE
C CAYETANENSIS DNA SPEC QL NAA+PROBE: NEGATIVE
C. DIFFICILE TOXIN A/B PCR: NEGATIVE
CAMPY SP DNA.DIARRHEA STL QL NAA+PROBE: NEGATIVE
CRYPTOSP DNA SPEC QL NAA+PROBE: NEGATIVE
EAEC PAA PLAS AGGR+AATA ST NAA+NON-PRB: NEGATIVE
EC STX1+STX2 + H7 FLIC SPEC NAA+PROBE: NEGATIVE
ENTAMOEBA HISTOLYTICA PCR: NEGATIVE
EPEC EAE GENE STL QL NAA+NON-PROBE: NEGATIVE
ETEC LTA+ST1A+ST1B TOX ST NAA+NON-PROBE: NEGATIVE
GIARDIA LAMBLIA PCR: NEGATIVE
NOROVIRUS GI/GII PCR: POSITIVE
P SHIGELLOIDES DNA STL QL NAA+PROBE: NEGATIVE
ROTAVIRUS A PCR: NEGATIVE
SALMONELLA DNA SPEC QL NAA+PROBE: NEGATIVE
SAPOVIRUS PCR: NEGATIVE
SHIGELLA SP+EIEC IPAH ST NAA+NON-PROBE: NEGATIVE
V CHOLERAE DNA SPEC QL NAA+PROBE: NEGATIVE
VIBRIO DNA SPEC NAA+PROBE: NEGATIVE
YERSINIA DNA SPEC NAA+PROBE: NEGATIVE

## 2019-01-11 ENCOUNTER — APPOINTMENT (OUTPATIENT)
Dept: PHYSICAL THERAPY | Facility: HOSPITAL | Age: 74
End: 2019-01-11
Attending: PHYSICAL MEDICINE & REHABILITATION
Payer: MEDICARE

## 2019-01-14 ENCOUNTER — APPOINTMENT (OUTPATIENT)
Dept: PHYSICAL THERAPY | Facility: HOSPITAL | Age: 74
End: 2019-01-14
Attending: INTERNAL MEDICINE
Payer: MEDICARE

## 2019-01-15 ENCOUNTER — LAB ENCOUNTER (OUTPATIENT)
Dept: LAB | Age: 74
End: 2019-01-15
Attending: INTERNAL MEDICINE
Payer: MEDICARE

## 2019-01-15 DIAGNOSIS — E87.6 HYPOKALEMIA: ICD-10-CM

## 2019-01-15 LAB
ALBUMIN SERPL BCP-MCNC: 3.6 G/DL (ref 3.5–4.8)
ANION GAP SERPL CALC-SCNC: 16 MMOL/L (ref 0–18)
BUN SERPL-MCNC: 12 MG/DL (ref 8–20)
BUN/CREAT SERPL: 13.6 (ref 10–20)
CALCIUM SERPL-MCNC: 9.5 MG/DL (ref 8.5–10.5)
CHLORIDE SERPL-SCNC: 95 MMOL/L (ref 95–110)
CO2 SERPL-SCNC: 27 MMOL/L (ref 22–32)
CREAT SERPL-MCNC: 0.88 MG/DL (ref 0.5–1.5)
GLUCOSE SERPL-MCNC: 96 MG/DL (ref 70–99)
OSMOLALITY UR CALC.SUM OF ELEC: 286 MOSM/KG (ref 275–295)
PHOSPHATE SERPL-MCNC: 2.9 MG/DL (ref 2.4–4.7)
POTASSIUM SERPL-SCNC: 2.8 MMOL/L (ref 3.3–5.1)
SODIUM SERPL-SCNC: 138 MMOL/L (ref 136–144)

## 2019-01-15 PROCEDURE — 80069 RENAL FUNCTION PANEL: CPT

## 2019-01-15 PROCEDURE — 36415 COLL VENOUS BLD VENIPUNCTURE: CPT

## 2019-01-18 ENCOUNTER — APPOINTMENT (OUTPATIENT)
Dept: PHYSICAL THERAPY | Facility: HOSPITAL | Age: 74
End: 2019-01-18
Attending: INTERNAL MEDICINE
Payer: MEDICARE

## 2019-01-21 ENCOUNTER — PRIOR ORIGINAL RECORDS (OUTPATIENT)
Dept: OTHER | Age: 74
End: 2019-01-21

## 2019-01-21 ENCOUNTER — APPOINTMENT (OUTPATIENT)
Dept: LAB | Facility: HOSPITAL | Age: 74
End: 2019-01-21
Attending: INTERNAL MEDICINE
Payer: MEDICARE

## 2019-01-21 ENCOUNTER — TELEPHONE (OUTPATIENT)
Dept: NEUROLOGY | Facility: CLINIC | Age: 74
End: 2019-01-21

## 2019-01-21 ENCOUNTER — APPOINTMENT (OUTPATIENT)
Dept: PHYSICAL THERAPY | Facility: HOSPITAL | Age: 74
End: 2019-01-21
Attending: INTERNAL MEDICINE
Payer: MEDICARE

## 2019-01-21 DIAGNOSIS — E87.6 HYPOKALEMIA: ICD-10-CM

## 2019-01-21 DIAGNOSIS — I48.91 A-FIB (HCC): ICD-10-CM

## 2019-01-21 LAB
INR BLD: 3.1 (ref 0.9–1.2)
INR: 3.1
POTASSIUM SERPL-SCNC: 4.6 MMOL/L (ref 3.3–5.1)
PROTHROMBIN TIME: 31 SECONDS (ref 11.8–14.5)

## 2019-01-21 PROCEDURE — 36415 COLL VENOUS BLD VENIPUNCTURE: CPT

## 2019-01-21 PROCEDURE — 85610 PROTHROMBIN TIME: CPT

## 2019-01-21 PROCEDURE — 84132 ASSAY OF SERUM POTASSIUM: CPT

## 2019-01-21 NOTE — TELEPHONE ENCOUNTER
Pt call returned. Pt stated that he received 100% relief from 01/04/19 right C7 TFESI. Pt is using no tramadol or gabapentin. Pt will restart PT next week. Pt has follow up appt 04/16/19. Dr Vinny Henley updated.

## 2019-01-25 ENCOUNTER — APPOINTMENT (OUTPATIENT)
Dept: PHYSICAL THERAPY | Facility: HOSPITAL | Age: 74
End: 2019-01-25
Attending: INTERNAL MEDICINE
Payer: MEDICARE

## 2019-01-28 ENCOUNTER — APPOINTMENT (OUTPATIENT)
Dept: PHYSICAL THERAPY | Facility: HOSPITAL | Age: 74
End: 2019-01-28
Attending: INTERNAL MEDICINE
Payer: MEDICARE

## 2019-02-01 ENCOUNTER — APPOINTMENT (OUTPATIENT)
Dept: PHYSICAL THERAPY | Facility: HOSPITAL | Age: 74
End: 2019-02-01
Attending: INTERNAL MEDICINE
Payer: MEDICARE

## 2019-02-04 ENCOUNTER — APPOINTMENT (OUTPATIENT)
Dept: PHYSICAL THERAPY | Facility: HOSPITAL | Age: 74
End: 2019-02-04
Attending: INTERNAL MEDICINE
Payer: MEDICARE

## 2019-02-07 ENCOUNTER — PRIOR ORIGINAL RECORDS (OUTPATIENT)
Dept: OTHER | Age: 74
End: 2019-02-07

## 2019-02-07 ENCOUNTER — APPOINTMENT (OUTPATIENT)
Dept: LAB | Facility: HOSPITAL | Age: 74
End: 2019-02-07
Attending: INTERNAL MEDICINE
Payer: MEDICARE

## 2019-02-07 DIAGNOSIS — I48.91 A-FIB (HCC): ICD-10-CM

## 2019-02-07 LAB
INR BLD: 2.6 (ref 0.9–1.2)
INR: 2.6
PROTHROMBIN TIME: 27.5 SECONDS (ref 11.8–14.5)

## 2019-02-07 PROCEDURE — 85610 PROTHROMBIN TIME: CPT

## 2019-02-07 PROCEDURE — 36415 COLL VENOUS BLD VENIPUNCTURE: CPT

## 2019-02-11 LAB
CHOLESTEROL, TOTAL: 148 MG/DL
HDL CHOLESTEROL: 42 MG/DL
HEMOGLOBIN A1C: 6.1 %
LDL CHOLESTEROL: 74 MG/DL
NON-HDL CHOLESTEROL: 106 MG/DL
THYROID STIMULATING HORMONE: 3.5 MLU/L
TRIGLYCERIDES: 158 MG/DL

## 2019-02-19 ENCOUNTER — PRIOR ORIGINAL RECORDS (OUTPATIENT)
Dept: OTHER | Age: 74
End: 2019-02-19

## 2019-02-21 ENCOUNTER — ANTI-COAG (OUTPATIENT)
Dept: CARDIOLOGY | Age: 74
End: 2019-02-21

## 2019-02-21 DIAGNOSIS — I48.92 ATRIAL FLUTTER, UNSPECIFIED TYPE (CMD): ICD-10-CM

## 2019-02-21 DIAGNOSIS — I48.91 ATRIAL FIBRILLATION, UNSPECIFIED TYPE (CMD): ICD-10-CM

## 2019-02-28 VITALS
DIASTOLIC BLOOD PRESSURE: 70 MMHG | BODY MASS INDEX: 36.73 KG/M2 | HEART RATE: 71 BPM | HEIGHT: 67 IN | OXYGEN SATURATION: 96 % | WEIGHT: 234 LBS | SYSTOLIC BLOOD PRESSURE: 136 MMHG

## 2019-02-28 VITALS
WEIGHT: 233 LBS | HEART RATE: 65 BPM | SYSTOLIC BLOOD PRESSURE: 102 MMHG | HEIGHT: 67 IN | BODY MASS INDEX: 36.57 KG/M2 | OXYGEN SATURATION: 95 % | DIASTOLIC BLOOD PRESSURE: 62 MMHG

## 2019-02-28 VITALS
BODY MASS INDEX: 36.57 KG/M2 | WEIGHT: 233 LBS | HEART RATE: 67 BPM | DIASTOLIC BLOOD PRESSURE: 80 MMHG | SYSTOLIC BLOOD PRESSURE: 122 MMHG | HEIGHT: 67 IN | RESPIRATION RATE: 16 BRPM | OXYGEN SATURATION: 96 %

## 2019-02-28 VITALS
HEIGHT: 67 IN | DIASTOLIC BLOOD PRESSURE: 76 MMHG | BODY MASS INDEX: 36.57 KG/M2 | WEIGHT: 233 LBS | OXYGEN SATURATION: 97 % | HEART RATE: 70 BPM | SYSTOLIC BLOOD PRESSURE: 106 MMHG

## 2019-03-01 VITALS
SYSTOLIC BLOOD PRESSURE: 102 MMHG | HEIGHT: 67 IN | OXYGEN SATURATION: 94 % | DIASTOLIC BLOOD PRESSURE: 66 MMHG | BODY MASS INDEX: 36.26 KG/M2 | RESPIRATION RATE: 10 BRPM | HEART RATE: 71 BPM | WEIGHT: 231 LBS

## 2019-03-19 ENCOUNTER — ANCILLARY PROCEDURE (OUTPATIENT)
Dept: CARDIOLOGY | Age: 74
End: 2019-03-19
Attending: INTERNAL MEDICINE

## 2019-03-19 ENCOUNTER — TELEPHONE (OUTPATIENT)
Dept: CARDIOLOGY | Age: 74
End: 2019-03-19

## 2019-03-19 DIAGNOSIS — I10 HYPERTENSION: Primary | ICD-10-CM

## 2019-03-19 LAB
HEART RATE RESERVE PREDICTED: 6.04 BPM
LV EF: 57 %
PEAK HR ACHIEVED: 140 BPM
RESTING HR ACHIEVED: 63 BPM
STRESS BASELINE BP: NORMAL MMHG
STRESS PERCENT HR: 94 %
STRESS POST ESTIMATED WORKLOAD: 8.2 METS
STRESS POST EXERCISE DUR MIN: 8 MIN
STRESS POST EXERCISE DUR SEC: 0 SEC
STRESS POST PEAK BP: NORMAL MMHG
STRESS TARGET HR: 149 BPM

## 2019-03-19 PROCEDURE — A9502 TC99M TETROFOSMIN: HCPCS | Performed by: INTERNAL MEDICINE

## 2019-03-19 PROCEDURE — 93018 CV STRESS TEST I&R ONLY: CPT | Performed by: INTERNAL MEDICINE

## 2019-03-19 PROCEDURE — 93016 CV STRESS TEST SUPVJ ONLY: CPT | Performed by: INTERNAL MEDICINE

## 2019-03-19 PROCEDURE — 93017 CV STRESS TEST TRACING ONLY: CPT | Performed by: INTERNAL MEDICINE

## 2019-03-19 PROCEDURE — 78452 HT MUSCLE IMAGE SPECT MULT: CPT | Performed by: INTERNAL MEDICINE

## 2019-03-19 ASSESSMENT — EXERCISE STRESS TEST
PEAK_RPP: 16330
PEAK_HR: 115
PEAK_HR: 95
PEAK_BP: 158/68
STOPPAGE_REASON: GENERAL FATIGUE
MPH: 3.4
PEAK_BP: 116/60
MPH: 2.5
PEAK_HR: 63
PEAK_BP: 148/64
PEAK_BP: 142/64
GRADE: 10
MPH: 1.7
PEAK_BP: 168/70
STAGE_CATEGORIES: RECOVERY 1
STAGE_CATEGORIES: 3
PEAK_HR: 92
PEAK_RPP: 23520
PEAK_RPP: 7308
PEAK_BP: 128/64
STAGE_CATEGORIES: 2
PEAK_RPP: 12160
STAGE_CATEGORIES: RECOVERY 2
STAGE_CATEGORIES: RESTING
PEAK_RPP: 14536
GRADE: 12
PEAK_HR: 140
PEAK_RPP: 11840
GRADE: 14
COMMENTS: INJECTED AT 7:00
STAGE_CATEGORIES: 1
PEAK_HR: 80

## 2019-03-21 ENCOUNTER — APPOINTMENT (OUTPATIENT)
Dept: LAB | Facility: HOSPITAL | Age: 74
End: 2019-03-21
Attending: INTERNAL MEDICINE
Payer: MEDICARE

## 2019-03-21 ENCOUNTER — ANTI-COAG (OUTPATIENT)
Dept: CARDIOLOGY | Age: 74
End: 2019-03-21

## 2019-03-21 DIAGNOSIS — I48.91 A-FIB (HCC): ICD-10-CM

## 2019-03-21 DIAGNOSIS — I48.92 ATRIAL FLUTTER, UNSPECIFIED TYPE (CMD): ICD-10-CM

## 2019-03-21 DIAGNOSIS — I48.91 ATRIAL FIBRILLATION, UNSPECIFIED TYPE (CMD): ICD-10-CM

## 2019-03-21 LAB
INR BLD: 3.09 (ref 0.9–1.2)
INR PPP: 3
PROTHROMBIN TIME: 32.5 SECONDS (ref 11.8–14.5)

## 2019-03-21 PROCEDURE — 36415 COLL VENOUS BLD VENIPUNCTURE: CPT

## 2019-03-21 PROCEDURE — 85610 PROTHROMBIN TIME: CPT

## 2019-03-28 ENCOUNTER — HOSPITAL ENCOUNTER (OUTPATIENT)
Dept: CT IMAGING | Facility: HOSPITAL | Age: 74
Discharge: HOME | End: 2019-03-28
Attending: UROLOGY
Payer: MEDICARE

## 2019-03-28 DIAGNOSIS — R31.0 GROSS HEMATURIA: ICD-10-CM

## 2019-03-28 PROCEDURE — 76376 3D RENDER W/INTRP POSTPROCES: CPT | Performed by: UROLOGY

## 2019-03-28 PROCEDURE — 74178 CT ABD&PLV WO CNTR FLWD CNTR: CPT | Performed by: UROLOGY

## 2019-03-28 PROCEDURE — 82565 ASSAY OF CREATININE: CPT

## 2019-03-29 ENCOUNTER — ANTI-COAG (OUTPATIENT)
Dept: CARDIOLOGY | Age: 74
End: 2019-03-29

## 2019-03-29 DIAGNOSIS — I48.91 ATRIAL FIBRILLATION, UNSPECIFIED TYPE (CMD): ICD-10-CM

## 2019-03-29 DIAGNOSIS — I48.92 ATRIAL FLUTTER, UNSPECIFIED TYPE (CMD): ICD-10-CM

## 2019-04-01 ENCOUNTER — LAB ENCOUNTER (OUTPATIENT)
Dept: LAB | Facility: HOSPITAL | Age: 74
End: 2019-04-01
Attending: UROLOGY
Payer: MEDICARE

## 2019-04-01 DIAGNOSIS — R31.0 GROSS HEMATURIA: Primary | ICD-10-CM

## 2019-04-01 PROCEDURE — 82565 ASSAY OF CREATININE: CPT

## 2019-04-01 PROCEDURE — 84520 ASSAY OF UREA NITROGEN: CPT

## 2019-04-01 PROCEDURE — 36415 COLL VENOUS BLD VENIPUNCTURE: CPT

## 2019-04-05 PROBLEM — R31.9 HEMATURIA, UNSPECIFIED TYPE: Status: ACTIVE | Noted: 2019-04-05

## 2019-04-05 PROBLEM — R31.0 GROSS HEMATURIA: Status: ACTIVE | Noted: 2019-04-05

## 2019-04-05 PROBLEM — R74.8 ABNORMAL LIVER ENZYMES: Status: ACTIVE | Noted: 2019-04-05

## 2019-04-05 PROBLEM — E11.9 DIABETES MELLITUS WITHOUT COMPLICATION (HCC): Status: ACTIVE | Noted: 2017-08-08

## 2019-04-09 RX ORDER — PAROXETINE 30 MG/1
TABLET, FILM COATED ORAL
COMMUNITY

## 2019-04-09 RX ORDER — FUROSEMIDE 40 MG/1
TABLET ORAL
COMMUNITY
Start: 2011-05-09

## 2019-04-09 RX ORDER — FLECAINIDE ACETATE 100 MG/1
TABLET ORAL
COMMUNITY
Start: 2018-02-27

## 2019-04-09 RX ORDER — PRAVASTATIN SODIUM 40 MG
TABLET ORAL
COMMUNITY

## 2019-04-09 RX ORDER — METOPROLOL SUCCINATE 50 MG/1
TABLET, EXTENDED RELEASE ORAL
COMMUNITY

## 2019-04-09 RX ORDER — WARFARIN SODIUM 5 MG/1
TABLET ORAL
COMMUNITY

## 2019-04-16 ENCOUNTER — OFFICE VISIT (OUTPATIENT)
Dept: NEUROLOGY | Facility: CLINIC | Age: 74
End: 2019-04-16
Payer: MEDICARE

## 2019-04-16 VITALS
DIASTOLIC BLOOD PRESSURE: 70 MMHG | HEIGHT: 67 IN | WEIGHT: 235 LBS | BODY MASS INDEX: 36.88 KG/M2 | HEART RATE: 80 BPM | SYSTOLIC BLOOD PRESSURE: 120 MMHG | RESPIRATION RATE: 16 BRPM

## 2019-04-16 DIAGNOSIS — M51.9 LUMBAR DISC DISEASE: ICD-10-CM

## 2019-04-16 DIAGNOSIS — M48.02 CERVICAL SPINAL STENOSIS: ICD-10-CM

## 2019-04-16 DIAGNOSIS — M51.26 LUMBAR HERNIATED DISC: ICD-10-CM

## 2019-04-16 DIAGNOSIS — M48.061 LUMBAR FORAMINAL STENOSIS: ICD-10-CM

## 2019-04-16 DIAGNOSIS — M54.16 LUMBAR RADICULOPATHY: ICD-10-CM

## 2019-04-16 DIAGNOSIS — M79.641 RIGHT HAND PAIN: ICD-10-CM

## 2019-04-16 DIAGNOSIS — M50.90 CERVICAL DISC DISEASE: ICD-10-CM

## 2019-04-16 DIAGNOSIS — M50.20 CERVICAL DISC HERNIATION: ICD-10-CM

## 2019-04-16 DIAGNOSIS — M43.10 RETROLISTHESIS: ICD-10-CM

## 2019-04-16 DIAGNOSIS — M54.12 CERVICAL RADICULOPATHY AT C6: Primary | ICD-10-CM

## 2019-04-16 PROCEDURE — 99214 OFFICE O/P EST MOD 30 MIN: CPT | Performed by: PHYSICAL MEDICINE & REHABILITATION

## 2019-04-16 NOTE — PATIENT INSTRUCTIONS
Plan  He will do 1-4 session of the PT on the cervical spine. If he is still having the pain after he passes the kidney stone, then he might benefit form PT on the lumbar spine using Jed techniques and stabilization.     The patient does not nee

## 2019-04-16 NOTE — PROGRESS NOTES
Low Back Pain H & P    Chief Complaint: Patient presents with:  Neck Pain: Patient presents for follow up on right C7 transforaminal epidural steroid injection done 1/4/19, states he got 95% relief from the injection, states he still has very little pain, file      Highest education level: Not on file    Occupational History      Not on file    Social Needs      Financial resource strain: Not on file      Food insecurity:        Worry: Not on file        Inability: Not on file      Transportation needs: with stairs. Review of Systems      PE: The patient does appear in his stated age in no distress. The patient is well groomed. Psychiatric:  The patient is alert and oriented x 3. The patient has a normal affect and mood.       Respiratory:  No a Tibialis posterior pulse-LEFT 2+     Neurological Lower Extremity:    Light Touch Sensation: Intact in bilateral Lower Extremities   LE Muscle Strength:  All LE strength measurements 5/5 except:  Hamstring RIGHT:   4+/5   RIGHT plantar reflexes: downward

## 2019-05-01 PROBLEM — E53.8 B12 DEFICIENCY: Status: ACTIVE | Noted: 2019-05-01

## 2019-05-01 PROBLEM — R79.89 ABNORMAL LIVER FUNCTION TESTS: Status: ACTIVE | Noted: 2019-05-01

## 2019-05-01 PROBLEM — I10 ESSENTIAL HYPERTENSION: Status: ACTIVE | Noted: 2019-05-01

## 2019-05-01 PROBLEM — R10.32 LEFT GROIN PAIN: Status: ACTIVE | Noted: 2017-08-08

## 2019-05-01 PROBLEM — M47.812 OSTEOARTHRITIS OF CERVICAL SPINE, UNSPECIFIED SPINAL OSTEOARTHRITIS COMPLICATION STATUS: Status: ACTIVE | Noted: 2019-05-01

## 2019-05-02 ENCOUNTER — LAB ENCOUNTER (OUTPATIENT)
Dept: LAB | Facility: HOSPITAL | Age: 74
End: 2019-05-02
Attending: INTERNAL MEDICINE
Payer: MEDICARE

## 2019-05-02 DIAGNOSIS — I48.91 A-FIB (HCC): ICD-10-CM

## 2019-05-02 DIAGNOSIS — M47.812 OSTEOARTHRITIS OF CERVICAL SPINE, UNSPECIFIED SPINAL OSTEOARTHRITIS COMPLICATION STATUS: ICD-10-CM

## 2019-05-02 DIAGNOSIS — I10 ESSENTIAL HYPERTENSION: ICD-10-CM

## 2019-05-02 DIAGNOSIS — R10.32 LEFT GROIN PAIN: ICD-10-CM

## 2019-05-02 DIAGNOSIS — R31.0 GROSS HEMATURIA: ICD-10-CM

## 2019-05-02 DIAGNOSIS — E53.8 B12 DEFICIENCY: ICD-10-CM

## 2019-05-02 DIAGNOSIS — R79.89 ABNORMAL LIVER FUNCTION TESTS: ICD-10-CM

## 2019-05-02 LAB
ABSOLUTE IMMATURE GRANULOCYTES (OFFPRE24): (no result)
ABSOLUTE IMMATURE GRANULOCYTES (OFFPRE24): NORMAL
BASO+EOS+MONOS # BLD: (no result) 10*3/UL
BASO+EOS+MONOS # BLD: NORMAL 10*3/UL
BASO+EOS+MONOS NFR BLD: (no result) %
BASO+EOS+MONOS NFR BLD: NORMAL %
BASOPHILS # BLD: (no result) 10*3/UL
BASOPHILS # BLD: NORMAL 10*3/UL
BASOPHILS NFR BLD: (no result) %
BASOPHILS NFR BLD: NORMAL %
DIFFERENTIAL METHOD BLD: (no result)
DIFFERENTIAL METHOD BLD: NORMAL
EOSINOPHIL # BLD: (no result) 10*3/UL
EOSINOPHIL # BLD: NORMAL 10*3/UL
EOSINOPHIL NFR BLD: (no result) %
EOSINOPHIL NFR BLD: NORMAL %
ERYTHROCYTE [DISTWIDTH] IN BLOOD: (no result) %
ERYTHROCYTE [DISTWIDTH] IN BLOOD: NORMAL %
HCT VFR BLD CALC: 42.6 %
HCT VFR BLD CALC: 42.6 %
HGB BLD-MCNC: 13.8 G/DL
HGB BLD-MCNC: 13.8 G/DL
IMMATURE GRANULOCYTES (OFFPRE25): (no result)
IMMATURE GRANULOCYTES (OFFPRE25): NORMAL
INR PPP: 2.9
LYMPHOCYTES # BLD: (no result) 10*3/UL
LYMPHOCYTES # BLD: NORMAL 10*3/UL
LYMPHOCYTES NFR BLD: (no result) %
LYMPHOCYTES NFR BLD: NORMAL %
MCH RBC QN AUTO: (no result) PG
MCH RBC QN AUTO: NORMAL PG
MCHC RBC AUTO-ENTMCNC: (no result) G/DL
MCHC RBC AUTO-ENTMCNC: NORMAL G/DL
MCV RBC AUTO: (no result) FL
MCV RBC AUTO: NORMAL FL
MONOCYTES # BLD: (no result) 10*3/UL
MONOCYTES # BLD: NORMAL 10*3/UL
MONOCYTES NFR BLD: (no result) %
MONOCYTES NFR BLD: NORMAL %
MPV (OFFPRE2): (no result)
MPV (OFFPRE2): NORMAL
NEUTROPHILS # BLD: (no result) 10*3/UL
NEUTROPHILS # BLD: NORMAL 10*3/UL
NEUTROPHILS NFR BLD: (no result) %
NEUTROPHILS NFR BLD: NORMAL %
NRBC BLD MANUAL-RTO: (no result) %
NRBC BLD MANUAL-RTO: NORMAL %
PLAT MORPH BLD: (no result)
PLAT MORPH BLD: NORMAL
PLATELET # BLD: (no result) 10*3/UL
PLATELET # BLD: NORMAL 10*3/UL
RBC # BLD: 4.42 10*6/UL
RBC # BLD: 4.42 10*6/UL
RBC MORPH BLD: (no result)
RBC MORPH BLD: NORMAL
WBC # BLD: 6.4 10*3/UL
WBC # BLD: 6.4 10*3/UL
WBC MORPH BLD: (no result)
WBC MORPH BLD: NORMAL

## 2019-05-02 PROCEDURE — 36415 COLL VENOUS BLD VENIPUNCTURE: CPT

## 2019-05-02 PROCEDURE — 80076 HEPATIC FUNCTION PANEL: CPT

## 2019-05-02 PROCEDURE — 85652 RBC SED RATE AUTOMATED: CPT

## 2019-05-02 PROCEDURE — 80069 RENAL FUNCTION PANEL: CPT

## 2019-05-02 PROCEDURE — 85610 PROTHROMBIN TIME: CPT

## 2019-05-02 PROCEDURE — 85025 COMPLETE CBC W/AUTO DIFF WBC: CPT

## 2019-05-03 ENCOUNTER — ANTI-COAG (OUTPATIENT)
Dept: CARDIOLOGY | Age: 74
End: 2019-05-03

## 2019-05-03 DIAGNOSIS — I48.91 ATRIAL FIBRILLATION, UNSPECIFIED TYPE (CMD): ICD-10-CM

## 2019-05-03 DIAGNOSIS — I48.92 ATRIAL FLUTTER, UNSPECIFIED TYPE (CMD): ICD-10-CM

## 2019-05-06 ENCOUNTER — LAB ENCOUNTER (OUTPATIENT)
Dept: LAB | Facility: HOSPITAL | Age: 74
End: 2019-05-06
Attending: UROLOGY
Payer: MEDICARE

## 2019-05-06 DIAGNOSIS — N20.0 KIDNEY STONE: ICD-10-CM

## 2019-05-06 DIAGNOSIS — N20.0 CALCULUS, KIDNEY: Primary | ICD-10-CM

## 2019-05-06 PROCEDURE — 36415 COLL VENOUS BLD VENIPUNCTURE: CPT

## 2019-05-06 PROCEDURE — 80069 RENAL FUNCTION PANEL: CPT

## 2019-05-08 ENCOUNTER — HOSPITAL ENCOUNTER (OUTPATIENT)
Dept: ULTRASOUND IMAGING | Facility: HOSPITAL | Age: 74
Discharge: HOME | End: 2019-05-08
Attending: UROLOGY
Payer: MEDICARE

## 2019-05-08 DIAGNOSIS — N20.0 KIDNEY STONE: ICD-10-CM

## 2019-05-08 PROCEDURE — 76775 US EXAM ABDO BACK WALL LIM: CPT | Performed by: UROLOGY

## 2019-06-13 ENCOUNTER — ANTI-COAG (OUTPATIENT)
Dept: CARDIOLOGY | Age: 74
End: 2019-06-13

## 2019-06-13 DIAGNOSIS — I48.91 ATRIAL FIBRILLATION, UNSPECIFIED TYPE (CMD): ICD-10-CM

## 2019-06-13 DIAGNOSIS — I48.92 ATRIAL FLUTTER, UNSPECIFIED TYPE (CMD): ICD-10-CM

## 2019-06-20 ENCOUNTER — LAB ENCOUNTER (OUTPATIENT)
Dept: LAB | Facility: HOSPITAL | Age: 74
End: 2019-06-20
Attending: INTERNAL MEDICINE
Payer: MEDICARE

## 2019-06-20 DIAGNOSIS — I48.91 ATRIAL FIBRILLATION (HCC): Primary | ICD-10-CM

## 2019-06-20 PROCEDURE — 36415 COLL VENOUS BLD VENIPUNCTURE: CPT

## 2019-06-20 PROCEDURE — 85610 PROTHROMBIN TIME: CPT

## 2019-06-21 ENCOUNTER — ANTI-COAG (OUTPATIENT)
Dept: CARDIOLOGY | Age: 74
End: 2019-06-21

## 2019-06-21 DIAGNOSIS — I48.91 ATRIAL FIBRILLATION, UNSPECIFIED TYPE (CMD): ICD-10-CM

## 2019-06-21 DIAGNOSIS — I48.92 ATRIAL FLUTTER, UNSPECIFIED TYPE (CMD): ICD-10-CM

## 2019-06-21 LAB — INR PPP: 2.42

## 2019-07-22 ENCOUNTER — APPOINTMENT (OUTPATIENT)
Dept: LAB | Facility: HOSPITAL | Age: 74
End: 2019-07-22
Attending: INTERNAL MEDICINE
Payer: MEDICARE

## 2019-07-22 DIAGNOSIS — I48.91 ATRIAL FIBRILLATION (HCC): ICD-10-CM

## 2019-07-22 LAB
INR BLD: 2.2 (ref 0.9–1.2)
INR PPP: 2.2
PROTHROMBIN TIME: 24.7 SECONDS (ref 11.8–14.5)

## 2019-07-22 PROCEDURE — 36415 COLL VENOUS BLD VENIPUNCTURE: CPT

## 2019-07-22 PROCEDURE — 85610 PROTHROMBIN TIME: CPT

## 2019-07-23 ENCOUNTER — ANTI-COAG (OUTPATIENT)
Dept: CARDIOLOGY | Age: 74
End: 2019-07-23

## 2019-07-23 DIAGNOSIS — I48.92 ATRIAL FLUTTER, UNSPECIFIED TYPE (CMD): ICD-10-CM

## 2019-07-23 DIAGNOSIS — I48.91 ATRIAL FIBRILLATION, UNSPECIFIED TYPE (CMD): ICD-10-CM

## 2019-08-08 RX ORDER — FAMOTIDINE 20 MG
1 TABLET ORAL DAILY
COMMUNITY
Start: 2013-08-20

## 2019-08-08 RX ORDER — DIPHENOXYLATE HYDROCHLORIDE AND ATROPINE SULFATE 2.5; .025 MG/1; MG/1
1 TABLET ORAL DAILY
COMMUNITY
Start: 2011-02-18

## 2019-08-09 ENCOUNTER — OFFICE VISIT (OUTPATIENT)
Dept: CARDIOLOGY | Age: 74
End: 2019-08-09

## 2019-08-09 VITALS
OXYGEN SATURATION: 96 % | WEIGHT: 235 LBS | BODY MASS INDEX: 36.88 KG/M2 | SYSTOLIC BLOOD PRESSURE: 110 MMHG | DIASTOLIC BLOOD PRESSURE: 70 MMHG | HEIGHT: 67 IN | HEART RATE: 70 BPM

## 2019-08-09 DIAGNOSIS — I48.92 ATRIAL FLUTTER, UNSPECIFIED TYPE (CMD): ICD-10-CM

## 2019-08-09 DIAGNOSIS — I48.91 ATRIAL FIBRILLATION, UNSPECIFIED TYPE (CMD): Primary | ICD-10-CM

## 2019-08-09 PROCEDURE — 93000 ELECTROCARDIOGRAM COMPLETE: CPT | Performed by: INTERNAL MEDICINE

## 2019-08-09 PROCEDURE — 99214 OFFICE O/P EST MOD 30 MIN: CPT | Performed by: INTERNAL MEDICINE

## 2019-08-09 SDOH — HEALTH STABILITY: MENTAL HEALTH: HOW OFTEN DO YOU HAVE A DRINK CONTAINING ALCOHOL?: NEVER

## 2019-08-27 PROBLEM — R42 ACUTE SEVERE VERTIGO: Status: ACTIVE | Noted: 2019-08-27

## 2019-09-05 ENCOUNTER — ANTI-COAG (OUTPATIENT)
Dept: CARDIOLOGY | Age: 74
End: 2019-09-05

## 2019-09-05 DIAGNOSIS — I48.92 ATRIAL FLUTTER, UNSPECIFIED TYPE (CMD): ICD-10-CM

## 2019-09-05 DIAGNOSIS — I48.91 ATRIAL FIBRILLATION, UNSPECIFIED TYPE (CMD): ICD-10-CM

## 2019-09-12 ENCOUNTER — APPOINTMENT (OUTPATIENT)
Dept: LAB | Facility: HOSPITAL | Age: 74
End: 2019-09-12
Attending: INTERNAL MEDICINE
Payer: MEDICARE

## 2019-09-12 DIAGNOSIS — I48.91 ATRIAL FIBRILLATION (HCC): ICD-10-CM

## 2019-09-12 LAB
INR BLD: 2.39 (ref 0.9–1.2)
PROTHROMBIN TIME: 26.4 SECONDS (ref 11.8–14.5)

## 2019-09-12 PROCEDURE — 85610 PROTHROMBIN TIME: CPT

## 2019-09-12 PROCEDURE — 36415 COLL VENOUS BLD VENIPUNCTURE: CPT

## 2019-09-13 ENCOUNTER — ANTI-COAG (OUTPATIENT)
Dept: CARDIOLOGY | Age: 74
End: 2019-09-13

## 2019-09-13 DIAGNOSIS — I48.92 ATRIAL FLUTTER, UNSPECIFIED TYPE (CMD): ICD-10-CM

## 2019-09-13 DIAGNOSIS — I48.91 ATRIAL FIBRILLATION, UNSPECIFIED TYPE (CMD): ICD-10-CM

## 2019-09-13 LAB — INR PPP: 2.39

## 2019-10-17 ENCOUNTER — APPOINTMENT (OUTPATIENT)
Dept: LAB | Facility: HOSPITAL | Age: 74
End: 2019-10-17
Attending: INTERNAL MEDICINE
Payer: MEDICARE

## 2019-10-17 DIAGNOSIS — I48.91 ATRIAL FIBRILLATION (HCC): ICD-10-CM

## 2019-10-17 LAB — INR PPP: 1.96

## 2019-10-17 PROCEDURE — 36415 COLL VENOUS BLD VENIPUNCTURE: CPT

## 2019-10-17 PROCEDURE — 85610 PROTHROMBIN TIME: CPT

## 2019-10-18 ENCOUNTER — ANTI-COAG (OUTPATIENT)
Dept: CARDIOLOGY | Age: 74
End: 2019-10-18

## 2019-10-18 DIAGNOSIS — I48.92 ATRIAL FLUTTER, UNSPECIFIED TYPE (CMD): ICD-10-CM

## 2019-10-18 DIAGNOSIS — I48.91 ATRIAL FIBRILLATION, UNSPECIFIED TYPE (CMD): ICD-10-CM

## 2019-11-11 ENCOUNTER — LAB ENCOUNTER (OUTPATIENT)
Dept: LAB | Facility: HOSPITAL | Age: 74
End: 2019-11-11
Attending: INTERNAL MEDICINE
Payer: MEDICARE

## 2019-11-11 DIAGNOSIS — I48.91 ATRIAL FIBRILLATION (HCC): ICD-10-CM

## 2019-11-11 DIAGNOSIS — E11.9 DIABETES MELLITUS, STABLE (HCC): ICD-10-CM

## 2019-11-11 DIAGNOSIS — M79.18 MUSCLE PAIN, MYOFASCIAL: ICD-10-CM

## 2019-11-11 LAB
ABSOLUTE IMMATURE GRANULOCYTES (OFFPRE24): NORMAL
BASO+EOS+MONOS # BLD: NORMAL 10*3/UL
BASO+EOS+MONOS NFR BLD: NORMAL %
BASOPHILS # BLD: NORMAL 10*3/UL
BASOPHILS NFR BLD: NORMAL %
DIFFERENTIAL METHOD BLD: NORMAL
EOSINOPHIL # BLD: NORMAL 10*3/UL
EOSINOPHIL NFR BLD: NORMAL %
ERYTHROCYTE [DISTWIDTH] IN BLOOD: NORMAL %
HCT VFR BLD CALC: 41.8 %
HGB BLD-MCNC: 13.7 G/DL
IMMATURE GRANULOCYTES (OFFPRE25): NORMAL
INR PPP: 2.5
LYMPHOCYTES # BLD: NORMAL 10*3/UL
LYMPHOCYTES NFR BLD: NORMAL %
MCH RBC QN AUTO: NORMAL PG
MCHC RBC AUTO-ENTMCNC: NORMAL G/DL
MCV RBC AUTO: NORMAL FL
MONOCYTES # BLD: NORMAL 10*3/UL
MONOCYTES NFR BLD: NORMAL %
MPV (OFFPRE2): NORMAL
NEUTROPHILS # BLD: NORMAL 10*3/UL
NEUTROPHILS NFR BLD: NORMAL %
NRBC BLD MANUAL-RTO: NORMAL %
PLAT MORPH BLD: NORMAL
PLATELET # BLD: NORMAL 10*3/UL
RBC # BLD: 4.36 10*6/UL
RBC MORPH BLD: NORMAL
WBC # BLD: 6.3 10*3/UL
WBC MORPH BLD: NORMAL

## 2019-11-11 PROCEDURE — 85652 RBC SED RATE AUTOMATED: CPT

## 2019-11-11 PROCEDURE — 36415 COLL VENOUS BLD VENIPUNCTURE: CPT

## 2019-11-11 PROCEDURE — 82550 ASSAY OF CK (CPK): CPT

## 2019-11-11 PROCEDURE — 85610 PROTHROMBIN TIME: CPT

## 2019-11-11 PROCEDURE — 80076 HEPATIC FUNCTION PANEL: CPT

## 2019-11-11 PROCEDURE — 85025 COMPLETE CBC W/AUTO DIFF WBC: CPT

## 2019-11-11 PROCEDURE — 83874 ASSAY OF MYOGLOBIN: CPT

## 2019-11-12 ENCOUNTER — ANTI-COAG (OUTPATIENT)
Dept: CARDIOLOGY | Age: 74
End: 2019-11-12

## 2019-11-12 DIAGNOSIS — I48.91 ATRIAL FIBRILLATION, UNSPECIFIED TYPE (CMD): ICD-10-CM

## 2019-11-12 DIAGNOSIS — I48.92 ATRIAL FLUTTER, UNSPECIFIED TYPE (CMD): ICD-10-CM

## 2019-11-12 PROBLEM — M47.816 LUMBAR SPONDYLOSIS: Status: ACTIVE | Noted: 2019-11-12

## 2019-11-12 PROBLEM — M79.18 MUSCLE PAIN, MYOFASCIAL: Status: ACTIVE | Noted: 2019-11-12

## 2019-11-22 LAB — HGB BLD-MCNC: 5.8 G/DL

## 2019-12-19 ENCOUNTER — APPOINTMENT (OUTPATIENT)
Dept: LAB | Facility: HOSPITAL | Age: 74
End: 2019-12-19
Attending: INTERNAL MEDICINE
Payer: MEDICARE

## 2019-12-19 DIAGNOSIS — I48.91 ATRIAL FIBRILLATION (HCC): ICD-10-CM

## 2019-12-19 LAB — INR PPP: 2.2

## 2019-12-19 PROCEDURE — 36415 COLL VENOUS BLD VENIPUNCTURE: CPT

## 2019-12-19 PROCEDURE — 85610 PROTHROMBIN TIME: CPT

## 2019-12-20 ENCOUNTER — ANTI-COAG (OUTPATIENT)
Dept: CARDIOLOGY | Age: 74
End: 2019-12-20

## 2019-12-20 DIAGNOSIS — I48.91 ATRIAL FIBRILLATION, UNSPECIFIED TYPE (CMD): ICD-10-CM

## 2019-12-20 DIAGNOSIS — I48.92 ATRIAL FLUTTER, UNSPECIFIED TYPE (CMD): ICD-10-CM

## 2020-01-23 ENCOUNTER — APPOINTMENT (OUTPATIENT)
Dept: LAB | Facility: HOSPITAL | Age: 75
End: 2020-01-23
Attending: INTERNAL MEDICINE
Payer: MEDICARE

## 2020-01-23 DIAGNOSIS — I48.91 ATRIAL FIBRILLATION (HCC): ICD-10-CM

## 2020-01-23 LAB
INR BLD: 2.03 (ref 0.9–1.2)
INR PPP: 2.03
PROTHROMBIN TIME: 23.1 SECONDS (ref 11.8–14.5)

## 2020-01-23 PROCEDURE — 36415 COLL VENOUS BLD VENIPUNCTURE: CPT

## 2020-01-23 PROCEDURE — 85610 PROTHROMBIN TIME: CPT

## 2020-01-24 ENCOUNTER — ANTI-COAG (OUTPATIENT)
Dept: CARDIOLOGY | Age: 75
End: 2020-01-24

## 2020-01-24 DIAGNOSIS — I48.91 ATRIAL FIBRILLATION, UNSPECIFIED TYPE (CMD): ICD-10-CM

## 2020-01-24 DIAGNOSIS — I48.92 ATRIAL FLUTTER, UNSPECIFIED TYPE (CMD): ICD-10-CM

## 2020-02-14 ENCOUNTER — OFFICE VISIT (OUTPATIENT)
Dept: CARDIOLOGY | Age: 75
End: 2020-02-14

## 2020-02-14 VITALS
DIASTOLIC BLOOD PRESSURE: 60 MMHG | OXYGEN SATURATION: 94 % | SYSTOLIC BLOOD PRESSURE: 122 MMHG | WEIGHT: 234 LBS | HEART RATE: 72 BPM | HEIGHT: 67 IN | BODY MASS INDEX: 36.73 KG/M2

## 2020-02-14 DIAGNOSIS — I48.0 PAROXYSMAL ATRIAL FIBRILLATION (CMD): Primary | ICD-10-CM

## 2020-02-14 DIAGNOSIS — G47.33 OSA (OBSTRUCTIVE SLEEP APNEA): ICD-10-CM

## 2020-02-14 DIAGNOSIS — E13.9 DIABETES 1.5, MANAGED AS TYPE 2 (CMD): ICD-10-CM

## 2020-02-14 PROCEDURE — 99214 OFFICE O/P EST MOD 30 MIN: CPT | Performed by: INTERNAL MEDICINE

## 2020-02-14 PROCEDURE — 93000 ELECTROCARDIOGRAM COMPLETE: CPT | Performed by: INTERNAL MEDICINE

## 2020-02-14 SDOH — HEALTH STABILITY: MENTAL HEALTH: HOW OFTEN DO YOU HAVE A DRINK CONTAINING ALCOHOL?: NEVER

## 2020-02-14 ASSESSMENT — PATIENT HEALTH QUESTIONNAIRE - PHQ9
SUM OF ALL RESPONSES TO PHQ9 QUESTIONS 1 AND 2: 0
2. FEELING DOWN, DEPRESSED OR HOPELESS: NOT AT ALL
1. LITTLE INTEREST OR PLEASURE IN DOING THINGS: NOT AT ALL
SUM OF ALL RESPONSES TO PHQ9 QUESTIONS 1 AND 2: 0

## 2020-02-28 PROBLEM — S02.40DA CLOSED FRACTURE OF LEFT SIDE OF MAXILLA, INITIAL ENCOUNTER (HCC): Status: ACTIVE | Noted: 2020-02-28

## 2020-02-28 PROBLEM — K76.0 FATTY LIVER DISEASE, NONALCOHOLIC: Status: ACTIVE | Noted: 2020-02-28

## 2020-02-28 PROBLEM — F33.41 RECURRENT MAJOR DEPRESSIVE DISORDER, IN PARTIAL REMISSION: Status: ACTIVE | Noted: 2020-02-28

## 2020-02-28 PROBLEM — F33.41 RECURRENT MAJOR DEPRESSIVE DISORDER, IN PARTIAL REMISSION (HCC): Status: ACTIVE | Noted: 2020-02-28

## 2020-02-28 PROBLEM — C61 MALIGNANT NEOPLASM OF PROSTATE (HCC): Status: ACTIVE | Noted: 2020-02-28

## 2020-03-05 ENCOUNTER — ANTI-COAG (OUTPATIENT)
Dept: CARDIOLOGY | Age: 75
End: 2020-03-05

## 2020-03-05 DIAGNOSIS — I48.0 PAROXYSMAL ATRIAL FIBRILLATION (CMD): ICD-10-CM

## 2020-03-05 DIAGNOSIS — I48.92 ATRIAL FLUTTER, UNSPECIFIED TYPE (CMD): ICD-10-CM

## 2020-04-02 ENCOUNTER — ANTI-COAG (OUTPATIENT)
Dept: CARDIOLOGY | Age: 75
End: 2020-04-02

## 2020-04-02 DIAGNOSIS — I48.92 ATRIAL FLUTTER, UNSPECIFIED TYPE (CMD): ICD-10-CM

## 2020-04-02 DIAGNOSIS — I48.0 PAROXYSMAL ATRIAL FIBRILLATION (CMD): ICD-10-CM

## 2020-04-30 ENCOUNTER — TELEPHONE (OUTPATIENT)
Dept: CARDIOLOGY | Age: 75
End: 2020-04-30

## 2020-04-30 ENCOUNTER — ANTI-COAG (OUTPATIENT)
Dept: CARDIOLOGY | Age: 75
End: 2020-04-30

## 2020-04-30 DIAGNOSIS — I48.92 ATRIAL FLUTTER, UNSPECIFIED TYPE (CMD): ICD-10-CM

## 2020-04-30 DIAGNOSIS — I48.0 PAROXYSMAL ATRIAL FIBRILLATION (CMD): ICD-10-CM

## 2020-05-01 ENCOUNTER — ANTI-COAG (OUTPATIENT)
Dept: CARDIOLOGY | Age: 75
End: 2020-05-01

## 2020-05-01 ENCOUNTER — LAB SERVICES (OUTPATIENT)
Dept: LAB | Age: 75
End: 2020-05-01

## 2020-05-01 DIAGNOSIS — Z79.01 LONG TERM (CURRENT) USE OF ANTICOAGULANTS: Primary | ICD-10-CM

## 2020-05-01 DIAGNOSIS — Z79.01 LONG TERM (CURRENT) USE OF ANTICOAGULANTS: ICD-10-CM

## 2020-05-01 LAB
INR BLDC: 3.9
INR PPP: 3.9

## 2020-05-07 DIAGNOSIS — Z79.01 LONG TERM (CURRENT) USE OF ANTICOAGULANTS: Primary | ICD-10-CM

## 2020-05-08 ENCOUNTER — LAB SERVICES (OUTPATIENT)
Dept: LAB | Age: 75
End: 2020-05-08

## 2020-05-08 ENCOUNTER — ANTI-COAG (OUTPATIENT)
Dept: CARDIOLOGY | Age: 75
End: 2020-05-08

## 2020-05-08 DIAGNOSIS — Z79.01 LONG TERM (CURRENT) USE OF ANTICOAGULANTS: ICD-10-CM

## 2020-05-08 LAB
INR BLDC: 2.9
INR PPP: 2.9

## 2020-05-29 ENCOUNTER — ANTI-COAG (OUTPATIENT)
Dept: CARDIOLOGY | Age: 75
End: 2020-05-29

## 2020-05-29 ENCOUNTER — LAB SERVICES (OUTPATIENT)
Dept: LAB | Age: 75
End: 2020-05-29

## 2020-05-29 DIAGNOSIS — Z79.01 LONG TERM (CURRENT) USE OF ANTICOAGULANTS: ICD-10-CM

## 2020-05-29 LAB
INR BLDC: 2.1
INR PPP: 2.1

## 2020-06-25 LAB — INR PPP: 3

## 2020-06-26 ENCOUNTER — ANTI-COAG (OUTPATIENT)
Dept: CARDIOLOGY | Age: 75
End: 2020-06-26

## 2020-06-26 DIAGNOSIS — I48.92 ATRIAL FLUTTER, UNSPECIFIED TYPE (CMD): ICD-10-CM

## 2020-06-26 DIAGNOSIS — I48.91 ATRIAL FIBRILLATION, UNSPECIFIED TYPE (CMD): ICD-10-CM

## 2020-06-27 PROBLEM — E78.00 HIGH CHOLESTEROL: Status: ACTIVE | Noted: 2020-06-27

## 2020-11-06 PROBLEM — S02.40DA CLOSED FRACTURE OF LEFT SIDE OF MAXILLA, INITIAL ENCOUNTER (HCC): Status: RESOLVED | Noted: 2020-02-28 | Resolved: 2020-11-06

## 2020-11-06 PROBLEM — D68.9 COAGULOPATHY (HCC): Status: ACTIVE | Noted: 2020-11-06

## 2021-01-05 ENCOUNTER — IMMUNIZATION (OUTPATIENT)
Dept: LAB | Facility: HOSPITAL | Age: 76
End: 2021-01-05
Attending: PREVENTIVE MEDICINE
Payer: MEDICARE

## 2021-01-05 DIAGNOSIS — Z23 NEED FOR VACCINATION: ICD-10-CM

## 2021-01-05 PROCEDURE — 0011A SARSCOV2 VAC 100MCG/0.5ML IM: CPT

## 2021-02-02 ENCOUNTER — OFFICE VISIT (OUTPATIENT)
Dept: CARDIOLOGY | Age: 76
End: 2021-02-02

## 2021-02-02 ENCOUNTER — IMMUNIZATION (OUTPATIENT)
Dept: LAB | Facility: HOSPITAL | Age: 76
End: 2021-02-02
Attending: PREVENTIVE MEDICINE
Payer: MEDICARE

## 2021-02-02 VITALS
HEART RATE: 80 BPM | OXYGEN SATURATION: 95 % | DIASTOLIC BLOOD PRESSURE: 74 MMHG | WEIGHT: 245 LBS | SYSTOLIC BLOOD PRESSURE: 124 MMHG | HEIGHT: 67 IN | BODY MASS INDEX: 38.45 KG/M2

## 2021-02-02 DIAGNOSIS — I48.0 PAROXYSMAL ATRIAL FIBRILLATION (CMD): Primary | ICD-10-CM

## 2021-02-02 DIAGNOSIS — E13.9 DIABETES 1.5, MANAGED AS TYPE 2 (CMD): ICD-10-CM

## 2021-02-02 DIAGNOSIS — Z23 NEED FOR VACCINATION: Primary | ICD-10-CM

## 2021-02-02 DIAGNOSIS — G47.33 OSA (OBSTRUCTIVE SLEEP APNEA): ICD-10-CM

## 2021-02-02 PROCEDURE — 99214 OFFICE O/P EST MOD 30 MIN: CPT | Performed by: INTERNAL MEDICINE

## 2021-02-02 PROCEDURE — 93000 ELECTROCARDIOGRAM COMPLETE: CPT | Performed by: INTERNAL MEDICINE

## 2021-02-02 PROCEDURE — 0012A SARSCOV2 VAC 100MCG/0.5ML IM: CPT

## 2021-02-02 ASSESSMENT — PATIENT HEALTH QUESTIONNAIRE - PHQ9
2. FEELING DOWN, DEPRESSED OR HOPELESS: NOT AT ALL
SUM OF ALL RESPONSES TO PHQ9 QUESTIONS 1 AND 2: 0
1. LITTLE INTEREST OR PLEASURE IN DOING THINGS: NOT AT ALL
SUM OF ALL RESPONSES TO PHQ9 QUESTIONS 1 AND 2: 0
CLINICAL INTERPRETATION OF PHQ2 SCORE: NO FURTHER SCREENING NEEDED
CLINICAL INTERPRETATION OF PHQ9 SCORE: NO FURTHER SCREENING NEEDED

## 2021-02-03 DIAGNOSIS — I48.0 PAROXYSMAL ATRIAL FIBRILLATION (CMD): ICD-10-CM

## 2021-06-17 ENCOUNTER — APPOINTMENT (OUTPATIENT)
Dept: GENERAL RADIOLOGY | Age: 76
End: 2021-06-17
Attending: EMERGENCY MEDICINE
Payer: MEDICARE

## 2021-06-17 ENCOUNTER — HOSPITAL ENCOUNTER (OUTPATIENT)
Age: 76
Discharge: HOME | End: 2021-06-17
Payer: MEDICARE

## 2021-06-17 VITALS
OXYGEN SATURATION: 95 % | SYSTOLIC BLOOD PRESSURE: 120 MMHG | TEMPERATURE: 98 F | RESPIRATION RATE: 18 BRPM | HEART RATE: 64 BPM | DIASTOLIC BLOOD PRESSURE: 70 MMHG

## 2021-06-17 DIAGNOSIS — M79.674 TOE PAIN, RIGHT: ICD-10-CM

## 2021-06-17 DIAGNOSIS — M10.9 ACUTE GOUT INVOLVING TOE OF RIGHT FOOT, UNSPECIFIED CAUSE: Primary | ICD-10-CM

## 2021-06-17 DIAGNOSIS — M19.90 ARTHRITIS: ICD-10-CM

## 2021-06-17 PROCEDURE — 99213 OFFICE O/P EST LOW 20 MIN: CPT | Performed by: EMERGENCY MEDICINE

## 2021-06-17 PROCEDURE — 73660 X-RAY EXAM OF TOE(S): CPT | Performed by: EMERGENCY MEDICINE

## 2021-06-17 RX ORDER — COLCHICINE 0.6 MG/1
TABLET ORAL
Qty: 3 TABLET | Refills: 0 | Status: SHIPPED | OUTPATIENT
Start: 2021-06-17

## 2021-06-17 RX ORDER — HYDROCODONE BITARTRATE AND ACETAMINOPHEN 5; 325 MG/1; MG/1
TABLET ORAL
Qty: 2 TABLET | Refills: 0 | Status: SHIPPED | OUTPATIENT
Start: 2021-06-17 | End: 2021-11-23

## 2021-06-17 NOTE — ED PROVIDER NOTES
Patient Seen in: [facility]      History   Patient presents with: Toe Pain    Stated Complaint: pain big toe rt foot    HPI/Subjective:   Camilla Paulino is a 70year old male here for pain and swelling of his right great toe.  Family hx of gout, Temp 97.8 °F (36.6 °C)   Resp 18   SpO2 95%         Physical Exam  Vitals and nursing note reviewed. Constitutional:       Appearance: Normal appearance. He is not ill-appearing. HENT:      Head: Normocephalic.    Eyes:      Conjunctiva/sclera: Conjunct Change position slowly  Qty: 2 tablet Refills: 0      Patient does not currently demonstrate complications of pain such as compartment syndrome, arterial injury or fx at this time. Will tx for gout. Podiatry f/u, or Emilie De La O f/u as discussed.      Medical R

## 2021-06-21 ENCOUNTER — LAB ENCOUNTER (OUTPATIENT)
Dept: LAB | Age: 76
End: 2021-06-21
Attending: INTERNAL MEDICINE
Payer: MEDICARE

## 2021-06-21 DIAGNOSIS — E11.9 DIABETES MELLITUS, STABLE (HCC): ICD-10-CM

## 2021-06-21 DIAGNOSIS — D68.9 COAGULOPATHY (HCC): ICD-10-CM

## 2021-06-21 DIAGNOSIS — E78.00 HIGH CHOLESTEROL: ICD-10-CM

## 2021-06-21 DIAGNOSIS — C61 MALIGNANT NEOPLASM OF PROSTATE (HCC): ICD-10-CM

## 2021-06-21 PROCEDURE — 80061 LIPID PANEL: CPT

## 2021-06-21 PROCEDURE — 83036 HEMOGLOBIN GLYCOSYLATED A1C: CPT

## 2021-06-21 PROCEDURE — 80053 COMPREHEN METABOLIC PANEL: CPT

## 2021-06-21 PROCEDURE — 36415 COLL VENOUS BLD VENIPUNCTURE: CPT

## 2021-06-21 PROCEDURE — 85025 COMPLETE CBC W/AUTO DIFF WBC: CPT

## 2021-08-06 ENCOUNTER — ANTI-COAG (OUTPATIENT)
Dept: CARDIOLOGY | Age: 76
End: 2021-08-06

## 2021-08-31 ENCOUNTER — TELEPHONE (OUTPATIENT)
Dept: GASTROENTEROLOGY | Facility: CLINIC | Age: 76
End: 2021-08-31

## 2021-08-31 ENCOUNTER — OFFICE VISIT (OUTPATIENT)
Dept: GASTROENTEROLOGY | Facility: CLINIC | Age: 76
End: 2021-08-31
Payer: MEDICARE

## 2021-08-31 VITALS
HEART RATE: 65 BPM | BODY MASS INDEX: 37.92 KG/M2 | WEIGHT: 241.63 LBS | SYSTOLIC BLOOD PRESSURE: 114 MMHG | HEIGHT: 67 IN | DIASTOLIC BLOOD PRESSURE: 68 MMHG

## 2021-08-31 DIAGNOSIS — Z86.010 HISTORY OF COLON POLYPS: Primary | ICD-10-CM

## 2021-08-31 DIAGNOSIS — Z12.11 COLON CANCER SCREENING: ICD-10-CM

## 2021-08-31 DIAGNOSIS — Z79.01 CHRONIC ANTICOAGULATION: Primary | ICD-10-CM

## 2021-08-31 PROCEDURE — 99202 OFFICE O/P NEW SF 15 MIN: CPT | Performed by: INTERNAL MEDICINE

## 2021-08-31 RX ORDER — POLYETHYLENE GLYCOL 3350, SODIUM CHLORIDE, SODIUM BICARBONATE, POTASSIUM CHLORIDE 420; 11.2; 5.72; 1.48 G/4L; G/4L; G/4L; G/4L
4 POWDER, FOR SOLUTION ORAL ONCE
Qty: 4000 ML | Refills: 0 | Status: SHIPPED | OUTPATIENT
Start: 2021-08-31 | End: 2021-08-31

## 2021-08-31 NOTE — TELEPHONE ENCOUNTER
Scheduled for:  Colonoscopy 79826  Provider Name:    Date:  11/29/21  Location:  [facility]   Sedation:  Mac   Time: 9517 Am   (pt is aware to arrive at 0945 Am)   Prep:Colyte or Trilyte  Prep instructions were given to pt in the office, pt verbalized und

## 2021-08-31 NOTE — PATIENT INSTRUCTIONS
Colonoscopy for history of colon polyps  - MAC sedation at [facility]  - PEG bowel preparation   - hold coumadin 3 days before ( check PT/INR day of procedure )  - meds as per anesthesia protocol

## 2021-08-31 NOTE — PROGRESS NOTES
Alex Vallejo is a 70year old male.     HPI:   Patient presents with:  Colonoscopy Screening    Patient is a 79-year-old male with a history of prostate cancer, prior cholecystectomy, diabetes, atrial fibrillation, sleep apnea, history of adenomatous colon p Never used    Alcohol use: No      Alcohol/week: 0.0 standard drinks      Comment: very rare    Drug use: No       Medications (Active prior to today's visit):  Current Outpatient Medications   Medication Sig Dispense Refill   • traMADol HCl 50 MG Oral Tab stated age and is in no acute distress  HEENT- anicteric sclera, neck no lymphadnopathy, OP- clear with no masses or lesions  Chest- Clear bilaterally, no wheezing,  Heart- regular rate, no murmur or gallop  Abdomen- Soft and nontender, nondistended  Ext-

## 2021-08-31 NOTE — TELEPHONE ENCOUNTER
Fyi;Rn's  Patient will be having Procedure on 11/29/21 at [facility]  Please Call PCP or Endocrine for the following recommendation for Medications adjustment of Diabetic meds Metformin or insulin   Or Anti-coagulants warfarin  or anti-platelet   Prior to Procedu

## 2021-09-01 NOTE — TELEPHONE ENCOUNTER
I faxed anticoagulation adjustment request to Dr. Bruce Tyler Fax # [phone]. Await orders. Patient on Metformin-orders already given to patient per protocol.

## 2021-09-09 ENCOUNTER — LAB ENCOUNTER (OUTPATIENT)
Dept: LAB | Age: 76
End: 2021-09-09
Attending: INTERNAL MEDICINE
Payer: MEDICARE

## 2021-09-09 DIAGNOSIS — I48.92 ATRIAL FLUTTER (HCC): ICD-10-CM

## 2021-09-09 DIAGNOSIS — I48.0 PAROXYSMAL ATRIAL FIBRILLATION (HCC): Primary | ICD-10-CM

## 2021-09-09 LAB
INR BLD: 2.94 (ref 0.9–1.2)
PROTHROMBIN TIME: 30.4 SECONDS (ref 11.8–14.5)

## 2021-09-09 PROCEDURE — 36415 COLL VENOUS BLD VENIPUNCTURE: CPT

## 2021-09-09 PROCEDURE — 85610 PROTHROMBIN TIME: CPT

## 2021-09-13 NOTE — TELEPHONE ENCOUNTER
Fax received from [facility] Cardiology \"patient may hold coumadin for 3 days prior to endoscopy\"

## 2021-09-13 NOTE — TELEPHONE ENCOUNTER
Dr. Cielo Delgado     Please review and sign below pended order for PT/INR prior to procedure 11/29/2021. Thank you    Patient contacted. Aware to hold coumadin 3 days prior to procedure. He asked that I remind him again closer to procedure date.  Encounter fl

## 2021-09-28 ENCOUNTER — TELEPHONE (OUTPATIENT)
Dept: NEUROLOGY | Facility: CLINIC | Age: 76
End: 2021-09-28

## 2021-09-28 ENCOUNTER — OFFICE VISIT (OUTPATIENT)
Dept: PHYSICAL MEDICINE AND REHAB | Facility: CLINIC | Age: 76
End: 2021-09-28
Payer: MEDICARE

## 2021-09-28 VITALS
OXYGEN SATURATION: 98 % | DIASTOLIC BLOOD PRESSURE: 70 MMHG | WEIGHT: 235 LBS | SYSTOLIC BLOOD PRESSURE: 120 MMHG | RESPIRATION RATE: 16 BRPM | BODY MASS INDEX: 36.88 KG/M2 | HEIGHT: 67 IN | HEART RATE: 80 BPM

## 2021-09-28 DIAGNOSIS — M51.9 LUMBAR DISC DISEASE: ICD-10-CM

## 2021-09-28 DIAGNOSIS — M48.061 LUMBAR FORAMINAL STENOSIS: ICD-10-CM

## 2021-09-28 DIAGNOSIS — M43.10 RETROLISTHESIS: ICD-10-CM

## 2021-09-28 DIAGNOSIS — M54.16 LUMBAR RADICULOPATHY: Primary | ICD-10-CM

## 2021-09-28 DIAGNOSIS — M51.26 LUMBAR HERNIATED DISC: Primary | ICD-10-CM

## 2021-09-28 DIAGNOSIS — M54.16 LUMBAR RADICULOPATHY: ICD-10-CM

## 2021-09-28 DIAGNOSIS — Z79.01 ANTICOAGULANT LONG-TERM USE: ICD-10-CM

## 2021-09-28 PROCEDURE — 99214 OFFICE O/P EST MOD 30 MIN: CPT | Performed by: PHYSICAL MEDICINE & REHABILITATION

## 2021-09-28 NOTE — PROGRESS NOTES
Low Back Pain H & P    Chief Complaint: Patient presents with: Follow - Up: c/o right hip pain an lower middle back, pain score is at 8. denies numbness and tingling    Nursing note reviewed and verified. Patient was last seen on 4/16/2019.   He did not SURGICAL HISTORY  2001    rotator cuff       Family History   Family History   Problem Relation Age of Onset   • Breast Cancer Mother    • Heart Disease Mother         CAD   • Heart Disease Father         No h/o heart disease       Social History   Social Activity:       Days of Exercise per Week: Not on file      Minutes of Exercise per Session: Not on file  Stress:       Feeling of Stress : Not on file  Social Connections:       Frequency of Communication with Friends and Family: Not on file      Frequenc Palpation:    Spinous Processes: Non-tender for all Spinous Processes   Z-joints: Non-tender for all Z-joints   SIJ: Non-tender for bilateral SIJ   Piriformis Muscle: Tender at bilateral Piriformis muscle(s)   Greater Trochanteric Bursa: Tender at right Gr

## 2021-09-28 NOTE — PATIENT INSTRUCTIONS
Plan  I will perform bilateral L4 TFESI(s). He will get into PT for the lumbar spine after having the above injections. He will use the Ultram if the pain is severe.     The patient will follow up in 2-3 months, but the patient will call me 2 weeks

## 2021-09-28 NOTE — TELEPHONE ENCOUNTER
Per Medicare guidelines authorization is not required for Bilateral L4 TFESI cpt codes 18172-11, 25984. Will inform Nursing.

## 2021-09-29 NOTE — TELEPHONE ENCOUNTER
Patient has been scheduled for Bilateral L4 TFESI on 10/26/21 at the [facility] with Marin Gitelman. -Anesthesia type: LOCAL.   -If receiving MAC or IVC sedation patient will need to get COVID tested 3 days prior even if already vaccinated (order placed by [facility].)  -P

## 2021-09-30 ENCOUNTER — LAB ENCOUNTER (OUTPATIENT)
Dept: LAB | Age: 76
End: 2021-09-30
Attending: INTERNAL MEDICINE
Payer: MEDICARE

## 2021-09-30 DIAGNOSIS — M79.10 MYALGIA: ICD-10-CM

## 2021-09-30 DIAGNOSIS — I48.92 ATRIAL FLUTTER (HCC): ICD-10-CM

## 2021-09-30 DIAGNOSIS — I48.0 PAROXYSMAL ATRIAL FIBRILLATION (HCC): ICD-10-CM

## 2021-09-30 PROCEDURE — 36415 COLL VENOUS BLD VENIPUNCTURE: CPT

## 2021-09-30 PROCEDURE — 82550 ASSAY OF CK (CPK): CPT

## 2021-09-30 PROCEDURE — 84439 ASSAY OF FREE THYROXINE: CPT

## 2021-09-30 PROCEDURE — 84443 ASSAY THYROID STIM HORMONE: CPT

## 2021-09-30 PROCEDURE — 85610 PROTHROMBIN TIME: CPT

## 2021-10-11 NOTE — TELEPHONE ENCOUNTER
Dr.Cash Bacon's nurse practitioner left VM stating they have been trying to fax our office the clearance letter, but it keeps failing. States patient has been cleared to hold the Warfarin for 5 days and the aspirin for 7 days.      Order for PT/INR plac

## 2021-10-19 NOTE — TELEPHONE ENCOUNTER
Pt  calling to see if he has on upcpming inj schedule and he is on the schedule for 10/26/21 at [facility] 7:30 am he wants to speak to a nurse because no one has call him to inform about the injection and he has questions regarding the medications he is current

## 2021-10-26 NOTE — TELEPHONE ENCOUNTER
Scheduled for:  Colonoscopy 71996  Provider Name:    Date:   FROM 11/29/21     TO 2/24/22  Location:  [facility] (location stays [address] [place] [address])  Sedation:  MAC  Time:   FROM 1045 Am      TO 10:00AM (pt is aware to arrive at 9:00am)  Prep:Colyte or Trilyte  Prep ins

## 2021-10-29 ENCOUNTER — LAB ENCOUNTER (OUTPATIENT)
Dept: LAB | Facility: HOSPITAL | Age: 76
End: 2021-10-29
Attending: PHYSICAL MEDICINE & REHABILITATION
Payer: MEDICARE

## 2021-10-29 ENCOUNTER — OFFICE VISIT (OUTPATIENT)
Dept: SURGERY | Facility: CLINIC | Age: 76
End: 2021-10-29

## 2021-10-29 DIAGNOSIS — M54.16 LUMBAR RADICULOPATHY: ICD-10-CM

## 2021-10-29 DIAGNOSIS — M54.16 LUMBAR RADICULOPATHY: Primary | ICD-10-CM

## 2021-10-29 DIAGNOSIS — Z79.01 ANTICOAGULANT LONG-TERM USE: ICD-10-CM

## 2021-10-29 DIAGNOSIS — M51.26 LUMBAR HERNIATED DISC: ICD-10-CM

## 2021-10-29 PROCEDURE — 64483 NJX AA&/STRD TFRM EPI L/S 1: CPT | Performed by: PHYSICAL MEDICINE & REHABILITATION

## 2021-10-29 PROCEDURE — 85610 PROTHROMBIN TIME: CPT

## 2021-10-29 PROCEDURE — 36415 COLL VENOUS BLD VENIPUNCTURE: CPT

## 2021-10-29 NOTE — PROCEDURES
[address]    BILATERAL LUMBAR TRANSFORAMINAL   NAME:  Enoch Alvarez    MR #:    [id number] :  10/6/1945     PHYSICIAN:  Harriet Santacruz MD        Operative Report    DATE OF PROCEDURE: 10/29/2021   PREOPERATIVE DIAGNOSES: 1. right lidocaine without epinephrine. After this, the needle was removed. Then  fluoroscopy was right anterior obliqued opening up the left L4-5 intervertebral foramen.   At this point in time, the patient's skin was anesthetized with 1 to 2 cc of 1% PF lidocain

## 2021-11-02 ENCOUNTER — TELEPHONE (OUTPATIENT)
Dept: PHYSICAL MEDICINE AND REHAB | Facility: CLINIC | Age: 76
End: 2021-11-02

## 2021-11-02 DIAGNOSIS — M48.061 LUMBAR FORAMINAL STENOSIS: ICD-10-CM

## 2021-11-02 DIAGNOSIS — M51.9 LUMBAR DISC DISEASE: ICD-10-CM

## 2021-11-02 DIAGNOSIS — M51.26 LUMBAR HERNIATED DISC: Primary | ICD-10-CM

## 2021-11-02 DIAGNOSIS — M54.16 LUMBAR RADICULOPATHY: ICD-10-CM

## 2021-11-02 DIAGNOSIS — M43.10 RETROLISTHESIS: ICD-10-CM

## 2021-11-02 NOTE — TELEPHONE ENCOUNTER
Pt called- feeling much better- pain in right leg but getting better. Still sore.  Please call to advise

## 2021-11-05 NOTE — TELEPHONE ENCOUNTER
Spoke to patient, states great deal of relief from injections. Had bilateral L4 TFESI 10/29/21. States slight pain right leg to knee. LOV 9/28/21 discussed physical therapy. Please place order for PT.  Patient to look into where he would like to go to the

## 2021-11-05 NOTE — TELEPHONE ENCOUNTER
Patient's procedure rescheduled to 2/24/2021. Patient was sent new prep instructions on 10/26/2021 via [employer] that include the coumadin orders which the patient read:  Last read by Turner Westbrook at 6:08 PM on 10/31/2021.

## 2021-11-12 ENCOUNTER — LAB ENCOUNTER (OUTPATIENT)
Dept: LAB | Age: 76
End: 2021-11-12
Attending: UROLOGY
Payer: MEDICARE

## 2021-11-12 DIAGNOSIS — C61 MALIGNANT NEOPLASM OF PROSTATE (HCC): Primary | ICD-10-CM

## 2021-11-12 PROCEDURE — 36415 COLL VENOUS BLD VENIPUNCTURE: CPT

## 2021-11-12 PROCEDURE — 84153 ASSAY OF PSA TOTAL: CPT

## 2021-11-12 NOTE — TELEPHONE ENCOUNTER
Spoke with patient and he states Dr. Maco Almonte gave him a list of PT places he can go so he will pick one of those. Patient requested I mail PT order to his home. Address Verified. Order mailed.

## 2021-11-16 ENCOUNTER — TELEPHONE (OUTPATIENT)
Dept: NEUROLOGY | Facility: CLINIC | Age: 76
End: 2021-11-16

## 2021-11-16 DIAGNOSIS — M43.10 RETROLISTHESIS: ICD-10-CM

## 2021-11-16 DIAGNOSIS — M51.26 LUMBAR HERNIATED DISC: Primary | ICD-10-CM

## 2021-11-16 DIAGNOSIS — M48.061 LUMBAR FORAMINAL STENOSIS: ICD-10-CM

## 2021-11-16 DIAGNOSIS — M51.9 LUMBAR DISC DISEASE: ICD-10-CM

## 2021-11-16 NOTE — TELEPHONE ENCOUNTER
PT calling to inform that he is currently in a lot of pain on the right leg, he had injections recently but did not help at all.he states he is looking to speak to someone for advise ASAP

## 2021-11-16 NOTE — TELEPHONE ENCOUNTER
Bilateral L4 TFESIs 10/29/21 with 80% relief initially for one week. Sharp Throbbing Right leg pain (from hip joint down to foot) that is constant starting one week.  Pain score 10/10  Not in physical therapy and feels the pain is so severe he is unable to

## 2021-11-16 NOTE — TELEPHONE ENCOUNTER
MRI SPINE LUMBAR (CPT=72148)-Authorization is not required  Per Medicare Guidelines: Request is a covered benefit and pre-certification is not require. All Medicare coverage is based on Medical Necessity.

## 2021-11-19 ENCOUNTER — HOSPITAL ENCOUNTER (OUTPATIENT)
Dept: MRI IMAGING | Facility: HOSPITAL | Age: 76
Discharge: HOME | End: 2021-11-19
Attending: PHYSICAL MEDICINE & REHABILITATION
Payer: MEDICARE

## 2021-11-19 DIAGNOSIS — M51.9 LUMBAR DISC DISEASE: ICD-10-CM

## 2021-11-19 DIAGNOSIS — M43.10 RETROLISTHESIS: ICD-10-CM

## 2021-11-19 DIAGNOSIS — M48.061 LUMBAR FORAMINAL STENOSIS: ICD-10-CM

## 2021-11-19 DIAGNOSIS — M51.26 LUMBAR HERNIATED DISC: ICD-10-CM

## 2021-11-19 PROCEDURE — 72148 MRI LUMBAR SPINE W/O DYE: CPT | Performed by: PHYSICAL MEDICINE & REHABILITATION

## 2021-11-22 ENCOUNTER — TELEPHONE (OUTPATIENT)
Dept: PHYSICAL MEDICINE AND REHAB | Facility: CLINIC | Age: 76
End: 2021-11-22

## 2021-11-22 DIAGNOSIS — M51.26 LUMBAR HERNIATED DISC: Primary | ICD-10-CM

## 2021-11-22 DIAGNOSIS — M48.061 LUMBAR FORAMINAL STENOSIS: ICD-10-CM

## 2021-11-22 DIAGNOSIS — M43.10 RETROLISTHESIS: ICD-10-CM

## 2021-11-22 DIAGNOSIS — M51.9 LUMBAR DISC DISEASE: ICD-10-CM

## 2021-11-22 DIAGNOSIS — M54.16 LUMBAR RADICULOPATHY: ICD-10-CM

## 2021-11-22 DIAGNOSIS — M48.061 SPINAL STENOSIS OF LUMBAR REGION WITHOUT NEUROGENIC CLAUDICATION: ICD-10-CM

## 2021-11-22 NOTE — TELEPHONE ENCOUNTER
Patient is in alot of pain, please call to discuss his test results are in already. Patient stated he left a vm message on the machine.  He needs to know \"next steps\"

## 2021-11-22 NOTE — TELEPHONE ENCOUNTER
He has a few herniated discs in the lumbar spine. I can give him oral steroids for one week and he can take the Norco for the pain again or he can go tot the ED.

## 2021-11-22 NOTE — TELEPHONE ENCOUNTER
Spoke with patient and states he had Bilateral L4 TFESI. He was fine for about a week and a half and the pain came back. States his right leg throbs from his right thigh to right calf and ankle (posterior and anterior) and is unable to lift right leg up.  H

## 2021-11-23 PROBLEM — M48.061 SPINAL STENOSIS OF LUMBAR REGION WITHOUT NEUROGENIC CLAUDICATION: Status: ACTIVE | Noted: 2021-11-23

## 2021-11-23 RX ORDER — HYDROCODONE BITARTRATE AND ACETAMINOPHEN 5; 325 MG/1; MG/1
TABLET ORAL
Refills: 0 | Status: CANCELLED | OUTPATIENT
Start: 2021-11-23

## 2021-11-23 RX ORDER — PREDNISONE 10 MG/1
10 TABLET ORAL DAILY
Qty: 28 EACH | Refills: 0 | Status: SHIPPED | OUTPATIENT
Start: 2021-11-23

## 2021-11-23 RX ORDER — HYDROCODONE BITARTRATE AND ACETAMINOPHEN 5; 325 MG/1; MG/1
TABLET ORAL
Qty: 30 TABLET | Refills: 0 | Status: SHIPPED | OUTPATIENT
Start: 2021-11-23

## 2021-11-23 RX ORDER — METHYLPREDNISOLONE 4 MG/1
TABLET ORAL
Refills: 0 | Status: CANCELLED | OUTPATIENT
Start: 2021-11-23

## 2021-11-23 RX ORDER — HYDROCODONE BITARTRATE AND ACETAMINOPHEN 5; 325 MG/1; MG/1
TABLET ORAL
Qty: 2 TABLET | Refills: 0 | Status: SHIPPED | OUTPATIENT
Start: 2021-11-23 | End: 2021-11-23

## 2021-11-23 NOTE — TELEPHONE ENCOUNTER
Spoke with patient and he states he would like to try the steroid pack and some Norco. He also wants to know if PT is something Dr. Cristopher Guerin thinks would help him.

## 2021-11-23 NOTE — TELEPHONE ENCOUNTER
He might need to watch his blood pressure with the Norco but the prednisone should stop him from having any issues with it. PT order placed.

## 2021-12-02 RX ORDER — OXYCODONE HYDROCHLORIDE AND ACETAMINOPHEN 5; 325 MG/1; MG/1
1 TABLET ORAL 2 TIMES DAILY PRN
Qty: 30 TABLET | Refills: 0 | Status: SHIPPED | OUTPATIENT
Start: 2021-12-02

## 2021-12-02 NOTE — TELEPHONE ENCOUNTER
S/w patient, notified of Percocet rx. Pt to stop taking Norco completely, monitor BP, and schedule physical therapy sessions. Pt verbalized understanding and agrees with plan.

## 2021-12-02 NOTE — TELEPHONE ENCOUNTER
Bilateral L4 TFESI on 10/29. Lumbar MRI done 11/19. S/w patient, c/o severe pain to mainly R lower back, radiates down to R hip and knee. Rates 8-10/10. Mostly soreness since numbness has gone away.  Took Prednisone taper and helped w/pain at the time but

## 2021-12-06 ENCOUNTER — HOSPITAL ENCOUNTER (OUTPATIENT)
Dept: GENERAL RADIOLOGY | Facility: HOSPITAL | Age: 76
Discharge: HOME | End: 2021-12-06
Attending: PHYSICAL MEDICINE & REHABILITATION
Payer: MEDICARE

## 2021-12-06 ENCOUNTER — TELEPHONE (OUTPATIENT)
Dept: PHYSICAL MEDICINE AND REHAB | Facility: CLINIC | Age: 76
End: 2021-12-06

## 2021-12-06 ENCOUNTER — OFFICE VISIT (OUTPATIENT)
Dept: PHYSICAL MEDICINE AND REHAB | Facility: CLINIC | Age: 76
End: 2021-12-06
Payer: MEDICARE

## 2021-12-06 VITALS
SYSTOLIC BLOOD PRESSURE: 110 MMHG | WEIGHT: 236 LBS | HEART RATE: 89 BPM | OXYGEN SATURATION: 96 % | BODY MASS INDEX: 37.04 KG/M2 | HEIGHT: 67 IN | DIASTOLIC BLOOD PRESSURE: 64 MMHG

## 2021-12-06 DIAGNOSIS — M48.061 SPINAL STENOSIS OF LUMBAR REGION WITHOUT NEUROGENIC CLAUDICATION: ICD-10-CM

## 2021-12-06 DIAGNOSIS — M25.551 RIGHT HIP PAIN: ICD-10-CM

## 2021-12-06 DIAGNOSIS — M25.561 CHRONIC PAIN OF RIGHT KNEE: ICD-10-CM

## 2021-12-06 DIAGNOSIS — M35.00 SJOGREN'S SYNDROME, WITH UNSPECIFIED ORGAN INVOLVEMENT (HCC): ICD-10-CM

## 2021-12-06 DIAGNOSIS — M51.26 LUMBAR HERNIATED DISC: ICD-10-CM

## 2021-12-06 DIAGNOSIS — M51.9 LUMBAR DISC DISEASE: ICD-10-CM

## 2021-12-06 DIAGNOSIS — C61 MALIGNANT NEOPLASM OF PROSTATE (HCC): ICD-10-CM

## 2021-12-06 DIAGNOSIS — G89.29 CHRONIC PAIN OF RIGHT KNEE: ICD-10-CM

## 2021-12-06 DIAGNOSIS — E11.9 DIABETES MELLITUS, STABLE (HCC): ICD-10-CM

## 2021-12-06 DIAGNOSIS — M54.16 LUMBAR RADICULOPATHY: Primary | ICD-10-CM

## 2021-12-06 DIAGNOSIS — M43.10 RETROLISTHESIS: ICD-10-CM

## 2021-12-06 DIAGNOSIS — E53.8 B12 DEFICIENCY: ICD-10-CM

## 2021-12-06 DIAGNOSIS — M48.061 LUMBAR FORAMINAL STENOSIS: ICD-10-CM

## 2021-12-06 PROCEDURE — 73562 X-RAY EXAM OF KNEE 3: CPT | Performed by: PHYSICAL MEDICINE & REHABILITATION

## 2021-12-06 PROCEDURE — 73502 X-RAY EXAM HIP UNI 2-3 VIEWS: CPT | Performed by: PHYSICAL MEDICINE & REHABILITATION

## 2021-12-06 PROCEDURE — 99214 OFFICE O/P EST MOD 30 MIN: CPT | Performed by: PHYSICAL MEDICINE & REHABILITATION

## 2021-12-06 NOTE — PROGRESS NOTES
Low Back Pain H & P    Chief Complaint: Patient presents with:  Low Back Pain: 10/29/21 Bilateral L4 TFESI. LOV 09/28/21. States 80% improvement for about 2 weeks. He took some steroids and he was fine while he was taking them but the pain is back not.  The Surgical History:   Procedure Laterality Date   • ABLATION  2011    cardiac ablation   • CHOLECYSTECTOMY  2009   • COLONOSCOPY  12/11   • COLONOSCOPY N/A 12/16/2016    Procedure: COLONOSCOPY;  Surgeon: Dorota Carlson MD;  Location: [address] [place] [address] ENDOSCOPY   • [facility]  Financial Resource Strain: Not on file  Food Insecurity: Not on file  Transportation Needs: Not on file  Physical Activity: Not on file  Stress: Not on file  Social Connections: Not on file  Intimate Partner Violence: Not on file  Housing Stability surgical scars are intact   Palpation: Tender at  right medial joint line  right pes anserine bursa   ROM: Extension full   Tests: No medial laxity, lateral laxity, anterior drawer sign, or posterior drawer sign     Hips:  Right hip internal and external r

## 2021-12-06 NOTE — PATIENT INSTRUCTIONS
Plan  He will get a right knee and right hip x-rays. After I have reviewed the above, I will determine if he needs a right hip injection or if he would need to see an orthopedic surgeon. He will try the Norco again at night for the pain.     He will

## 2021-12-07 ENCOUNTER — TELEPHONE (OUTPATIENT)
Dept: PHYSICAL MEDICINE AND REHAB | Facility: CLINIC | Age: 76
End: 2021-12-07

## 2021-12-07 DIAGNOSIS — M16.11 PRIMARY OSTEOARTHRITIS OF RIGHT HIP: Primary | ICD-10-CM

## 2021-12-07 DIAGNOSIS — M25.551 RIGHT HIP PAIN: ICD-10-CM

## 2021-12-08 ENCOUNTER — TELEPHONE (OUTPATIENT)
Dept: PHYSICAL MEDICINE AND REHAB | Facility: CLINIC | Age: 76
End: 2021-12-08

## 2021-12-08 ENCOUNTER — TELEPHONE (OUTPATIENT)
Dept: NEUROLOGY | Facility: CLINIC | Age: 76
End: 2021-12-08

## 2021-12-08 PROBLEM — M16.11 PRIMARY OSTEOARTHRITIS OF RIGHT HIP: Status: ACTIVE | Noted: 2021-12-08

## 2021-12-08 NOTE — TELEPHONE ENCOUNTER
Reviewed appt date and time with . Ok to offer patient 12/9/21 @ 8 am.  Patient has been scheduled for Right hip injection under ultrasound guidance on 12/9/21 @ 8 am in the office.  -Medications and allergies reviewed.    -Patient will arrive 13 mi

## 2021-12-08 NOTE — TELEPHONE ENCOUNTER
Per Medicare Guidelines guidelines-no authorization required for Lidocaine/Kenalog injections in office    Right hip injection under ultrasound guidance CPT 24131+    Status: Approved-Covered Benefit    Routing to clinical staff to schedule

## 2021-12-08 NOTE — TELEPHONE ENCOUNTER
2nd call - Patient calling to speak to [address] or his nurse to go over his MRI & X-Ray results this have been posted on his my chart account and wants to go over results and see how he is going to proceed from here.

## 2021-12-08 NOTE — TELEPHONE ENCOUNTER
Patient completed lumbar MRI on 11/19/21, and right hip and right knee xrays on 12/6/21. Message has been sent to Marin Gitelman to review and advise.     Per Marin Gitelman at [address] [place] [address]: \"After I have reviewed the above, I will determine if he needs a right hip injectio

## 2021-12-08 NOTE — TELEPHONE ENCOUNTER
The right hip has moderate to severe degenerative arthritis and the right knee is ok. He will need to have a right hip injection and he might need to see an orthopedic surgeon also. The order has been placed.

## 2021-12-09 ENCOUNTER — OFFICE VISIT (OUTPATIENT)
Dept: PHYSICAL MEDICINE AND REHAB | Facility: CLINIC | Age: 76
End: 2021-12-09
Payer: MEDICARE

## 2021-12-09 DIAGNOSIS — M25.551 RIGHT HIP PAIN: ICD-10-CM

## 2021-12-09 DIAGNOSIS — M16.11 PRIMARY OSTEOARTHRITIS OF RIGHT HIP: Primary | ICD-10-CM

## 2021-12-09 PROCEDURE — 20611 DRAIN/INJ JOINT/BURSA W/US: CPT | Performed by: PHYSICAL MEDICINE & REHABILITATION

## 2021-12-09 RX ORDER — LIDOCAINE HYDROCHLORIDE 10 MG/ML
6.5 INJECTION, SOLUTION INFILTRATION; PERINEURAL ONCE
Status: COMPLETED | OUTPATIENT
Start: 2021-12-09 | End: 2021-12-09

## 2021-12-09 RX ORDER — TRIAMCINOLONE ACETONIDE 40 MG/ML
60 INJECTION, SUSPENSION INTRA-ARTICULAR; INTRAMUSCULAR ONCE
Status: COMPLETED | OUTPATIENT
Start: 2021-12-09 | End: 2021-12-09

## 2021-12-09 NOTE — PROCEDURES
The patient is here for a right hip injection done under ultrasound guidance. Under ultrasound guidance the right femoral-acetabular joint was identified and a tee was placed on the patient's skin. The skin was cleaned with betadyne swabs x 3 and anesth

## 2021-12-23 ENCOUNTER — MED REC SCAN ONLY (OUTPATIENT)
Dept: PHYSICAL MEDICINE AND REHAB | Facility: CLINIC | Age: 76
End: 2021-12-23

## 2021-12-29 ENCOUNTER — TELEPHONE (OUTPATIENT)
Dept: PHYSICAL MEDICINE AND REHAB | Facility: CLINIC | Age: 76
End: 2021-12-29

## 2021-12-29 NOTE — TELEPHONE ENCOUNTER
Patient left VM on 12/14/21 stated he is much improved after the right hip injection that was done on 12/9/21.

## 2022-01-21 ENCOUNTER — TELEPHONE (OUTPATIENT)
Dept: PHYSICAL MEDICINE AND REHAB | Facility: CLINIC | Age: 77
End: 2022-01-21

## 2022-01-21 NOTE — TELEPHONE ENCOUNTER
Patient called on 12/14/2021 and said he was improving after injection on 12/09/2021. See TE 12/29/2021. Patient states his pain is starting to flare up and wants to get another Hip injection.  I told him usually there is a 3 month gap in between inject

## 2022-01-24 NOTE — TELEPHONE ENCOUNTER
Offered patient an earlier appt this week and he stated he wanted to wait a few weeks. NOV 02/15/22.

## 2022-01-25 ENCOUNTER — LAB ENCOUNTER (OUTPATIENT)
Dept: LAB | Age: 77
End: 2022-01-25
Attending: INTERNAL MEDICINE
Payer: MEDICARE

## 2022-01-25 DIAGNOSIS — Z13.29 ENCOUNTER FOR SCREENING FOR ENDOCRINE DISORDER: ICD-10-CM

## 2022-01-25 DIAGNOSIS — I48.0 PAROXYSMAL ATRIAL FIBRILLATION (HCC): ICD-10-CM

## 2022-01-25 DIAGNOSIS — E11.9 DIABETES MELLITUS, STABLE (HCC): ICD-10-CM

## 2022-01-25 DIAGNOSIS — Z13.0 SCREENING FOR DISORDER OF BLOOD AND BLOOD-FORMING ORGANS: ICD-10-CM

## 2022-01-25 DIAGNOSIS — I10 ESSENTIAL HYPERTENSION: ICD-10-CM

## 2022-01-25 DIAGNOSIS — Z13.228 ENCOUNTER FOR SCREENING FOR METABOLIC DISORDER: ICD-10-CM

## 2022-01-25 DIAGNOSIS — Z13.220 ENCOUNTER FOR SCREENING FOR LIPID DISORDER: ICD-10-CM

## 2022-01-25 DIAGNOSIS — I48.92 ATRIAL FLUTTER (HCC): ICD-10-CM

## 2022-01-25 LAB
ALBUMIN SERPL-MCNC: 3.6 G/DL (ref 3.4–5)
ALBUMIN/GLOB SERPL: 1 {RATIO} (ref 1–2)
ALP LIVER SERPL-CCNC: 65 U/L
ALT SERPL-CCNC: 64 U/L
ANION GAP SERPL CALC-SCNC: 4 MMOL/L (ref 0–18)
AST SERPL-CCNC: 50 U/L (ref 15–37)
BASOPHILS # BLD AUTO: 0.06 X10(3) UL (ref 0–0.2)
BASOPHILS NFR BLD AUTO: 0.7 %
BILIRUB SERPL-MCNC: 0.4 MG/DL (ref 0.1–2)
BUN BLD-MCNC: 17 MG/DL (ref 7–18)
BUN/CREAT SERPL: 18.7 (ref 10–20)
CALCIUM BLD-MCNC: 9.8 MG/DL (ref 8.5–10.1)
CHLORIDE SERPL-SCNC: 105 MMOL/L (ref 98–112)
CHOLEST SERPL-MCNC: 160 MG/DL (ref ?–200)
CO2 SERPL-SCNC: 29 MMOL/L (ref 21–32)
CREAT BLD-MCNC: 0.91 MG/DL
CREAT UR-SCNC: 49.2 MG/DL
DEPRECATED RDW RBC AUTO: 49.9 FL (ref 35.1–46.3)
EOSINOPHIL # BLD AUTO: 0.19 X10(3) UL (ref 0–0.7)
EOSINOPHIL NFR BLD AUTO: 2.3 %
ERYTHROCYTE [DISTWIDTH] IN BLOOD BY AUTOMATED COUNT: 14.4 % (ref 11–15)
FASTING PATIENT LIPID ANSWER: NO
FASTING STATUS PATIENT QL REPORTED: NO
GLOBULIN PLAS-MCNC: 3.6 G/DL (ref 2.8–4.4)
GLUCOSE BLD-MCNC: 99 MG/DL (ref 70–99)
HCT VFR BLD AUTO: 42.7 %
HDLC SERPL-MCNC: 51 MG/DL (ref 40–59)
HGB BLD-MCNC: 13.5 G/DL
IMM GRANULOCYTES # BLD AUTO: 0.02 X10(3) UL (ref 0–1)
IMM GRANULOCYTES NFR BLD: 0.2 %
INR BLD: 2.54 (ref 0.8–1.2)
LDLC SERPL CALC-MCNC: 79 MG/DL (ref ?–100)
LYMPHOCYTES # BLD AUTO: 3.18 X10(3) UL (ref 1–4)
LYMPHOCYTES NFR BLD AUTO: 38.7 %
MCH RBC QN AUTO: 30 PG (ref 26–34)
MCHC RBC AUTO-ENTMCNC: 31.6 G/DL (ref 31–37)
MCV RBC AUTO: 94.9 FL
MICROALBUMIN UR-MCNC: 0.87 MG/DL
MICROALBUMIN/CREAT 24H UR-RTO: 17.7 UG/MG (ref ?–30)
MONOCYTES # BLD AUTO: 0.71 X10(3) UL (ref 0.1–1)
MONOCYTES NFR BLD AUTO: 8.6 %
NEUTROPHILS # BLD AUTO: 4.06 X10 (3) UL (ref 1.5–7.7)
NEUTROPHILS # BLD AUTO: 4.06 X10(3) UL (ref 1.5–7.7)
NEUTROPHILS NFR BLD AUTO: 49.5 %
NONHDLC SERPL-MCNC: 109 MG/DL (ref ?–130)
OSMOLALITY SERPL CALC.SUM OF ELEC: 288 MOSM/KG (ref 275–295)
PLATELET # BLD AUTO: 279 10(3)UL (ref 150–450)
POTASSIUM SERPL-SCNC: 3.9 MMOL/L (ref 3.5–5.1)
PROT SERPL-MCNC: 7.2 G/DL (ref 6.4–8.2)
PROTHROMBIN TIME: 27.7 SECONDS (ref 11.6–14.8)
RBC # BLD AUTO: 4.5 X10(6)UL
SODIUM SERPL-SCNC: 138 MMOL/L (ref 136–145)
T4 FREE SERPL-MCNC: 0.9 NG/DL (ref 0.8–1.7)
TRIGL SERPL-MCNC: 178 MG/DL (ref 30–149)
TSI SER-ACNC: 4.99 MIU/ML (ref 0.36–3.74)
VLDLC SERPL CALC-MCNC: 28 MG/DL (ref 0–30)
WBC # BLD AUTO: 8.2 X10(3) UL (ref 4–11)

## 2022-01-25 PROCEDURE — 36415 COLL VENOUS BLD VENIPUNCTURE: CPT

## 2022-01-25 PROCEDURE — 80061 LIPID PANEL: CPT

## 2022-01-25 PROCEDURE — 82043 UR ALBUMIN QUANTITATIVE: CPT

## 2022-01-25 PROCEDURE — 82570 ASSAY OF URINE CREATININE: CPT

## 2022-01-25 PROCEDURE — 84439 ASSAY OF FREE THYROXINE: CPT

## 2022-01-25 PROCEDURE — 85025 COMPLETE CBC W/AUTO DIFF WBC: CPT

## 2022-01-25 PROCEDURE — 80053 COMPREHEN METABOLIC PANEL: CPT

## 2022-01-25 PROCEDURE — 85610 PROTHROMBIN TIME: CPT

## 2022-01-25 PROCEDURE — 84443 ASSAY THYROID STIM HORMONE: CPT

## 2022-02-10 ENCOUNTER — TELEPHONE (OUTPATIENT)
Dept: GASTROENTEROLOGY | Facility: CLINIC | Age: 77
End: 2022-02-10

## 2022-02-10 NOTE — TELEPHONE ENCOUNTER
Schedulers:  Please contact patient. He is scheduled for a colonoscopy on 2/24/2022 and wants to know when first available would be if he were to reschedule.     Thank you

## 2022-02-15 ENCOUNTER — OFFICE VISIT (OUTPATIENT)
Dept: PHYSICAL MEDICINE AND REHAB | Facility: CLINIC | Age: 77
End: 2022-02-15
Payer: MEDICARE

## 2022-02-15 VITALS
BODY MASS INDEX: 37.67 KG/M2 | SYSTOLIC BLOOD PRESSURE: 138 MMHG | HEART RATE: 80 BPM | OXYGEN SATURATION: 96 % | DIASTOLIC BLOOD PRESSURE: 82 MMHG | HEIGHT: 67 IN | WEIGHT: 240 LBS

## 2022-02-15 DIAGNOSIS — M25.551 RIGHT HIP PAIN: ICD-10-CM

## 2022-02-15 DIAGNOSIS — M48.061 LUMBAR FORAMINAL STENOSIS: ICD-10-CM

## 2022-02-15 DIAGNOSIS — M48.061 SPINAL STENOSIS OF LUMBAR REGION WITHOUT NEUROGENIC CLAUDICATION: ICD-10-CM

## 2022-02-15 DIAGNOSIS — M16.11 PRIMARY OSTEOARTHRITIS OF RIGHT HIP: Primary | ICD-10-CM

## 2022-02-15 DIAGNOSIS — M43.10 RETROLISTHESIS: ICD-10-CM

## 2022-02-15 DIAGNOSIS — M51.26 LUMBAR HERNIATED DISC: ICD-10-CM

## 2022-02-15 DIAGNOSIS — M51.9 LUMBAR DISC DISEASE: ICD-10-CM

## 2022-02-15 PROCEDURE — 99214 OFFICE O/P EST MOD 30 MIN: CPT | Performed by: PHYSICAL MEDICINE & REHABILITATION

## 2022-02-15 NOTE — TELEPHONE ENCOUNTER
Rescheduled for:  Colonoscopy 0[address]86  Provider Name:  Dr. Rossy Man  Date:    From-2/24/22 To-6/10/22  Location:    [address] [place] [address]  Sedation:  MAC  Time:    From-1000 BR-[id number] (pt is aware to arrive at [facility])   Prep:  Trilyte, sent new instructions via [employer] on 2/15/22  Meds/Allergies Reconciled?:  Physician reviewed   Diagnosis with codes:  History of colon polyps Z86.010  Was patient informed to call insurance with codes (Y/N):  Yes, I confirmed Medicare/BCBS PPO insurance with the patient. Referral sent?:  Referral has been authorized from 10/26/21 to 10/26/22  [address] [place] [address] or [address] [place] [address] notified?:  I sent a revised electronic request to Endo Scheduling and received a confirmation today. Medication Orders: This patient verbally confirmed that he is not taking:   Iron, BP meds, and IS diabetic   Not latex allergy, Not PCN allergy and does not have a pacemaker   This patient is aware to HOLD his Metformin the day before and the day of the procedure    This patient is aware to HOLD his Coumadin x 3 days before the procedure   This patient is aware to HOLD all supplements x 7 days before the procedure. Misc Orders:       Further instructions given by staff:     This patient had no questions regarding the prep instructions

## 2022-02-16 NOTE — PATIENT INSTRUCTIONS
Plan  I spoke to him about seeing an orthopedic surgeon to find out if he is a candidate for right hip replacement surgery and what would be involved with this. I spoke to him about under going right hip articular nerve blocks and if these give good relief then follow this up with right femoral and obturator articular nerve radiofrequency neurotomies. He stated that he would get back to me after he has spoken to the orthopedic surgeon to let me know what he is deciding to do. He will follow up with me if he decides not to have surgery.

## 2022-03-28 ENCOUNTER — TELEPHONE (OUTPATIENT)
Dept: NEUROLOGY | Facility: CLINIC | Age: 77
End: 2022-03-28

## 2022-03-29 NOTE — TELEPHONE ENCOUNTER
Spoke with patient and he states he saw an orthopaedic surgeon but would like to know what dr. Robin Elkins thinks. NOV 04/11/22.

## 2022-04-01 PROBLEM — M16.11 OSTEOARTHRITIS OF RIGHT HIP, UNSPECIFIED OSTEOARTHRITIS TYPE: Status: ACTIVE | Noted: 2022-04-01

## 2022-04-11 ENCOUNTER — OFFICE VISIT (OUTPATIENT)
Dept: PHYSICAL MEDICINE AND REHAB | Facility: CLINIC | Age: 77
End: 2022-04-11
Payer: MEDICARE

## 2022-04-11 ENCOUNTER — TELEPHONE (OUTPATIENT)
Dept: PHYSICAL MEDICINE AND REHAB | Facility: CLINIC | Age: 77
End: 2022-04-11

## 2022-04-11 DIAGNOSIS — M16.11 PRIMARY OSTEOARTHRITIS OF RIGHT HIP: Primary | ICD-10-CM

## 2022-04-11 DIAGNOSIS — M51.26 LUMBAR HERNIATED DISC: ICD-10-CM

## 2022-04-11 DIAGNOSIS — M43.10 RETROLISTHESIS: ICD-10-CM

## 2022-04-11 DIAGNOSIS — M48.061 LUMBAR FORAMINAL STENOSIS: ICD-10-CM

## 2022-04-11 DIAGNOSIS — M25.551 RIGHT HIP PAIN: ICD-10-CM

## 2022-04-11 DIAGNOSIS — M48.061 SPINAL STENOSIS OF LUMBAR REGION WITHOUT NEUROGENIC CLAUDICATION: ICD-10-CM

## 2022-04-11 DIAGNOSIS — M51.9 LUMBAR DISC DISEASE: ICD-10-CM

## 2022-04-11 DIAGNOSIS — M54.16 LUMBAR RADICULOPATHY: ICD-10-CM

## 2022-04-11 PROCEDURE — 99214 OFFICE O/P EST MOD 30 MIN: CPT | Performed by: PHYSICAL MEDICINE & REHABILITATION

## 2022-04-11 RX ORDER — OXYCODONE HYDROCHLORIDE AND ACETAMINOPHEN 5; 325 MG/1; MG/1
1 TABLET ORAL 2 TIMES DAILY PRN
Qty: 30 TABLET | Refills: 0 | Status: SHIPPED | OUTPATIENT
Start: 2022-04-11

## 2022-04-11 NOTE — TELEPHONE ENCOUNTER
Per Medicare Guidelines -no authorization is required for nerve blocks     Status: Approved-Covered Benefit     Clinical staff can proceed with scheduling Right femoral and obturator nerve blocks CPT [id number]

## 2022-04-11 NOTE — PATIENT INSTRUCTIONS
Plan  I will perform right femoral and obturator articular nerve blocks. If he does well with the above, then he would be a candidate for eight radiofrequency neurotomies of the above nerves or having the right hip replaced. He will continue with the Oxycodone for the pain. He will call me the day after having the injections to let me know how they working for him. He will follow up in 3 months or sooner if needed.

## 2022-04-12 NOTE — TELEPHONE ENCOUNTER
Anticoag form faxed to Dr. [facility] office to hold warfarin and baby aspirin.

## 2022-04-14 NOTE — TELEPHONE ENCOUNTER
Pt called to ask status of clearance from Dr. Oralia Bourne office. Notified him we have not received a response. S/w Mason Dominique at Dr. Tramaine Freedman office, awaiting a response.

## 2022-04-18 NOTE — TELEPHONE ENCOUNTER
Patient calling to see if he can schedule his injection at this time, hoping that the clearance from Dr. Sia Soni office has arrived. Pt want a call back ASAP as he will like to be added on the schedule for this Friday.

## 2022-04-19 ENCOUNTER — TELEPHONE (OUTPATIENT)
Dept: PHYSICAL MEDICINE AND REHAB | Facility: CLINIC | Age: 77
End: 2022-04-19

## 2022-04-19 NOTE — TELEPHONE ENCOUNTER
Patient has been scheduled for Right femoral and obturator nerve blocks under fluoroscopy on 4/29/2022 at the [address] with [address]. Mark See. -Anesthesia type: Local.  -If receiving MAC or IVC sedation patient will need to get COVID tested 3 days prior even if already vaccinated (order placed by [address] N/A  -If scheduling [facility] covid testing required for all procedures whether patient is vaccinated or not. N/A  -Patient informed not to eat or drink anything after midnight the night prior to the procedure, if being sedated. N/A  -Patient was advised that if he/she does receive the covid vaccine it needs to be at least 2 weeks before or after the injection. Received vaccine on 4/15/22 - educated on 2 week time frame in between injections. Scheduled for 2 weeks after on 4/29/22.  -Medications and allergies reviewed. -Patient reminded to hold NSAIDs (Ibuprofen, ASA 81, Aleve, Naproxen, Mobic etc.) for 3 days prior to [facility]  if BMI is greater than 35. For Cervical injections only hold multivitamins, Vitamin E, Fish Oil, Phentermine (Lomaira) for 7 days prior to injection and NSAIDS.  mg to be held for 7 days prior to injections.  -If patient is receiving MAC/IVCS Phentermine Gaylia Few) will need to be held for 7 days prior to injection.  -If on blood thinner clearance has been received to hold this medication by provider. Received clearance letter and will send for scanning.   -Patient informed he/she will need a  to and from procedure. -[facility] is located in the [facility] 1st floor. Patient may park in the yellow parking. Patient verbalized understanding and agrees with plan.  -----> Scheduled in Epic: Yes  -----> Scheduled in [employer]:  Yes

## 2022-04-28 ENCOUNTER — HOSPITAL ENCOUNTER (OUTPATIENT)
Age: 77
End: 2022-04-28
Attending: ORTHOPAEDIC SURGERY | Admitting: ORTHOPAEDIC SURGERY

## 2022-05-03 ENCOUNTER — OFFICE VISIT (OUTPATIENT)
Dept: SURGERY | Facility: CLINIC | Age: 77
End: 2022-05-03

## 2022-05-03 ENCOUNTER — LAB ENCOUNTER (OUTPATIENT)
Dept: LAB | Facility: HOSPITAL | Age: 77
End: 2022-05-03
Attending: PHYSICAL MEDICINE & REHABILITATION
Payer: MEDICARE

## 2022-05-03 DIAGNOSIS — M16.11 PRIMARY OSTEOARTHRITIS OF RIGHT HIP: ICD-10-CM

## 2022-05-03 DIAGNOSIS — I48.0 PAROXYSMAL ATRIAL FIBRILLATION (HCC): ICD-10-CM

## 2022-05-03 DIAGNOSIS — M25.551 RIGHT HIP PAIN: Primary | ICD-10-CM

## 2022-05-03 LAB
INR BLD: 1.07 (ref 0.8–1.2)
PROTHROMBIN TIME: 14 SECONDS (ref 11.6–14.8)

## 2022-05-03 PROCEDURE — 85610 PROTHROMBIN TIME: CPT

## 2022-05-03 PROCEDURE — 77002 NEEDLE LOCALIZATION BY XRAY: CPT | Performed by: PHYSICAL MEDICINE & REHABILITATION

## 2022-05-03 PROCEDURE — 36415 COLL VENOUS BLD VENIPUNCTURE: CPT

## 2022-05-03 PROCEDURE — 64450 NJX AA&/STRD OTHER PN/BRANCH: CPT | Performed by: PHYSICAL MEDICINE & REHABILITATION

## 2022-05-04 ENCOUNTER — TELEPHONE (OUTPATIENT)
Dept: PHYSICAL MEDICINE AND REHAB | Facility: CLINIC | Age: 77
End: 2022-05-04

## 2022-05-04 NOTE — TELEPHONE ENCOUNTER
Per Medicare Guidelines -no authorization is required for injection     Status: Approved-Covered Benefit     Clinical staff can proceed with scheduling Right Hip Lidocaine injection under ultrasound guidance CPT 15870

## 2022-05-09 NOTE — TELEPHONE ENCOUNTER
Patient calling stating he is in severe pain and needs injection appointment sooner.    Scheduled patient for 5/10/22 at 12:00 PM.

## 2022-05-10 ENCOUNTER — OFFICE VISIT (OUTPATIENT)
Dept: PHYSICAL MEDICINE AND REHAB | Facility: CLINIC | Age: 77
End: 2022-05-10
Payer: MEDICARE

## 2022-05-10 DIAGNOSIS — M25.551 RIGHT HIP PAIN: Primary | ICD-10-CM

## 2022-05-10 DIAGNOSIS — M16.11 PRIMARY OSTEOARTHRITIS OF RIGHT HIP: ICD-10-CM

## 2022-05-10 PROCEDURE — 20611 DRAIN/INJ JOINT/BURSA W/US: CPT | Performed by: PHYSICAL MEDICINE & REHABILITATION

## 2022-05-10 RX ORDER — LIDOCAINE HYDROCHLORIDE 20 MG/ML
5 INJECTION, SOLUTION INFILTRATION; PERINEURAL ONCE
Status: COMPLETED | OUTPATIENT
Start: 2022-05-10 | End: 2022-05-10

## 2022-05-10 RX ORDER — LIDOCAINE HYDROCHLORIDE 10 MG/ML
5 INJECTION, SOLUTION INFILTRATION; PERINEURAL ONCE
Status: COMPLETED | OUTPATIENT
Start: 2022-05-10 | End: 2022-05-10

## 2022-05-11 ENCOUNTER — TELEPHONE (OUTPATIENT)
Dept: PHYSICAL MEDICINE AND REHAB | Facility: CLINIC | Age: 77
End: 2022-05-11

## 2022-05-11 NOTE — TELEPHONE ENCOUNTER
S/w patient to let him know I have notified Dr Jude Liu. Patient will reach out to Dr lópez to Dr Jude Liu for hip replacement.

## 2022-05-11 NOTE — PROCEDURES
The patient is here for a right hip injection done under ultrasound guidance. Under ultrasound guidance the right femoral-acetabular joint was identified and a tee was placed on the patient's skin. The skin was cleaned with betadyne swabs x 3 and anesthetized with 1% lidocaine without epinephrine. Then a 22 gauge needle was inserted under ultrasound guidance into the right femoral-acetabular joint. Aspiration was performed and no blood, fluid, or air was aspirated. The patient was then injected with 5 ml of 2% lidocaine without epinephrine. The needle was removed and a band aid was applied along with triple antibiotic ointment. The patient will follow up in 1 day with a phone call. These images are permanently stored in the ultrasound unit or PACS system. None

## 2022-05-23 ENCOUNTER — TELEPHONE (OUTPATIENT)
Dept: GASTROENTEROLOGY | Facility: CLINIC | Age: 77
End: 2022-05-23

## 2022-05-23 NOTE — TELEPHONE ENCOUNTER
Please fax over request for Warfarin hold for [facility] next week -   How many days does pt need hold for     Fax [phone]

## 2022-05-23 NOTE — TELEPHONE ENCOUNTER
I faxed a request to [address] Dr Jammie Elkins office to ask if pt can hold coumadin for 3 days prior to his procedure on 06/10/22    Plan  Colonoscopy for history of colon polyps  - MAC sedation at [facility]  - PEG bowel preparation   - hold coumadin 3 days before ( check PT/INR day of procedure )  - meds as per anesthesia protocol

## 2022-05-24 ENCOUNTER — TELEPHONE (OUTPATIENT)
Dept: GASTROENTEROLOGY | Facility: CLINIC | Age: 77
End: 2022-05-24

## 2022-05-24 NOTE — TELEPHONE ENCOUNTER
Pt has questions for nurse to find out if his proc can be done at the [facility]? Pt is sched to have proc done at [facility] on 6/10/2022.

## 2022-05-25 NOTE — TELEPHONE ENCOUNTER
I called [facility] to follow up  I spoke to Anyi New and confirmed that ok for patient to hold coumadin 3 days prior per previous order in media tab from September 2021.

## 2022-05-25 NOTE — TELEPHONE ENCOUNTER
Patient is on coumadin and has order from 9/2021 to have pt/inr drawn on arrival to [address] [place] [address] Way [facility]-this cannot be done at [facility] and likely why he was scheduled at hospital.

## 2022-05-25 NOTE — TELEPHONE ENCOUNTER
Left message for patient to call back. I want to let him know to hold coumadin 3 days prior to procedure.     I also want to let him know procedure needs to be done at the hospital because needs Pt/Inr drawn prior to procedure-this cannot be done at [facility].

## 2022-06-13 ENCOUNTER — LAB ENCOUNTER (OUTPATIENT)
Dept: LAB | Facility: HOSPITAL | Age: 77
End: 2022-06-13
Attending: ORTHOPAEDIC SURGERY
Payer: MEDICARE

## 2022-06-13 DIAGNOSIS — Z01.818 PRE-OP TESTING: ICD-10-CM

## 2022-06-13 LAB
ANTIBODY SCREEN: NEGATIVE
MRSA DNA SPEC QL NAA+PROBE: NEGATIVE
RH BLOOD TYPE: POSITIVE
RH BLOOD TYPE: POSITIVE
SARS-COV-2 RNA RESP QL NAA+PROBE: NOT DETECTED

## 2022-06-13 PROCEDURE — 86901 BLOOD TYPING SEROLOGIC RH(D): CPT

## 2022-06-13 PROCEDURE — 36415 COLL VENOUS BLD VENIPUNCTURE: CPT

## 2022-06-13 PROCEDURE — 86900 BLOOD TYPING SEROLOGIC ABO: CPT

## 2022-06-13 PROCEDURE — 87641 MR-STAPH DNA AMP PROBE: CPT

## 2022-06-13 PROCEDURE — 86850 RBC ANTIBODY SCREEN: CPT

## 2022-06-13 RX ORDER — ACETAMINOPHEN 500 MG
500 TABLET ORAL EVERY 6 HOURS PRN
COMMUNITY

## 2022-06-13 RX ORDER — METOCLOPRAMIDE 10 MG/1
10 TABLET ORAL ONCE
Status: CANCELLED | OUTPATIENT
Start: 2022-06-13 | End: 2022-06-13

## 2022-06-13 RX ORDER — ACETAMINOPHEN 500 MG
1000 TABLET ORAL ONCE
Status: CANCELLED | OUTPATIENT
Start: 2022-06-13 | End: 2022-06-13

## 2022-06-13 RX ORDER — FAMOTIDINE 20 MG/1
20 TABLET, FILM COATED ORAL ONCE
Status: CANCELLED | OUTPATIENT
Start: 2022-06-13 | End: 2022-06-13

## 2022-06-13 RX ORDER — CELECOXIB 200 MG/1
200 CAPSULE ORAL EVERY MORNING
COMMUNITY

## 2022-06-13 RX ORDER — SODIUM CHLORIDE, SODIUM LACTATE, POTASSIUM CHLORIDE, CALCIUM CHLORIDE 600; 310; 30; 20 MG/100ML; MG/100ML; MG/100ML; MG/100ML
INJECTION, SOLUTION INTRAVENOUS CONTINUOUS
Status: CANCELLED | OUTPATIENT
Start: 2022-06-13

## 2022-06-16 ENCOUNTER — APPOINTMENT (OUTPATIENT)
Dept: GENERAL RADIOLOGY | Facility: HOSPITAL | Age: 77
End: 2022-06-16
Attending: ORTHOPAEDIC SURGERY
Payer: MEDICARE

## 2022-06-16 ENCOUNTER — ANESTHESIA (OUTPATIENT)
Dept: SURGERY | Facility: HOSPITAL | Age: 77
End: 2022-06-16
Payer: MEDICARE

## 2022-06-16 ENCOUNTER — ANESTHESIA EVENT (OUTPATIENT)
Dept: SURGERY | Facility: HOSPITAL | Age: 77
End: 2022-06-16
Payer: MEDICARE

## 2022-06-16 ENCOUNTER — HOSPITAL ENCOUNTER (OUTPATIENT)
Facility: HOSPITAL | Age: 77
Discharge: HOME HEALTH | End: 2022-06-17
Attending: ORTHOPAEDIC SURGERY | Admitting: ORTHOPAEDIC SURGERY
Payer: MEDICARE

## 2022-06-16 DIAGNOSIS — Z01.818 PRE-OP TESTING: Primary | ICD-10-CM

## 2022-06-16 LAB
GLUCOSE BLDC GLUCOMTR-MCNC: 149 MG/DL (ref 70–99)
GLUCOSE BLDC GLUCOMTR-MCNC: 157 MG/DL (ref 70–99)
GLUCOSE BLDC GLUCOMTR-MCNC: 160 MG/DL (ref 70–99)
GLUCOSE BLDC GLUCOMTR-MCNC: 204 MG/DL (ref 70–99)
INR BLD: 1.07 (ref 0.8–1.2)
PROTHROMBIN TIME: 14 SECONDS (ref 11.6–14.8)

## 2022-06-16 PROCEDURE — 97110 THERAPEUTIC EXERCISES: CPT

## 2022-06-16 PROCEDURE — 85610 PROTHROMBIN TIME: CPT | Performed by: ORTHOPAEDIC SURGERY

## 2022-06-16 PROCEDURE — 97116 GAIT TRAINING THERAPY: CPT

## 2022-06-16 PROCEDURE — 82962 GLUCOSE BLOOD TEST: CPT

## 2022-06-16 PROCEDURE — 36415 COLL VENOUS BLD VENIPUNCTURE: CPT | Performed by: ORTHOPAEDIC SURGERY

## 2022-06-16 PROCEDURE — 73501 X-RAY EXAM HIP UNI 1 VIEW: CPT | Performed by: ORTHOPAEDIC SURGERY

## 2022-06-16 PROCEDURE — 88311 DECALCIFY TISSUE: CPT | Performed by: ORTHOPAEDIC SURGERY

## 2022-06-16 PROCEDURE — 0SR903Z REPLACEMENT OF RIGHT HIP JOINT WITH CERAMIC SYNTHETIC SUBSTITUTE, OPEN APPROACH: ICD-10-PCS | Performed by: ORTHOPAEDIC SURGERY

## 2022-06-16 PROCEDURE — 8E0YXBF COMPUTER ASSISTED PROCEDURE OF LOWER EXTREMITY, WITH FLUOROSCOPY: ICD-10-PCS | Performed by: ORTHOPAEDIC SURGERY

## 2022-06-16 PROCEDURE — 76000 FLUOROSCOPY <1 HR PHYS/QHP: CPT | Performed by: ORTHOPAEDIC SURGERY

## 2022-06-16 PROCEDURE — 88305 TISSUE EXAM BY PATHOLOGIST: CPT | Performed by: ORTHOPAEDIC SURGERY

## 2022-06-16 PROCEDURE — 97161 PT EVAL LOW COMPLEX 20 MIN: CPT

## 2022-06-16 DEVICE — PINNACLE CANCELLOUS BONE SCREW 6.5MM X 30MM
Type: IMPLANTABLE DEVICE | Site: HIP | Status: FUNCTIONAL
Brand: PINNACLE

## 2022-06-16 DEVICE — PINNACLE CANCELLOUS BONE SCREW 6.5MM X 20MM
Type: IMPLANTABLE DEVICE | Site: HIP | Status: FUNCTIONAL
Brand: PINNACLE

## 2022-06-16 DEVICE — PINNACLE GRIPTION ACETABULAR SHELL SECTOR 50MM OD
Type: IMPLANTABLE DEVICE | Site: HIP | Status: FUNCTIONAL
Brand: PINNACLE GRIPTION

## 2022-06-16 DEVICE — BIOLOX DELTA CERAMIC FEMORAL HEAD 32MM DIA +9 12/14 TAPER
Type: IMPLANTABLE DEVICE | Site: HIP | Status: FUNCTIONAL
Brand: BIOLOX DELTA

## 2022-06-16 DEVICE — ACTIS DUOFIX HIP PROSTHESIS (FEMORAL STEM 12/14 TAPER CEMENTLESS SIZE 5 STD COLLAR)  CE
Type: IMPLANTABLE DEVICE | Site: HIP | Status: FUNCTIONAL
Brand: ACTIS

## 2022-06-16 DEVICE — PINNACLE HIP SOLUTIONS ALTRX POLYETHYLENE ACETABULAR LINER +4 NEUTRAL 32MM ID 50MM OD
Type: IMPLANTABLE DEVICE | Site: HIP | Status: FUNCTIONAL
Brand: PINNACLE ALTRX

## 2022-06-16 RX ORDER — TRANEXAMIC ACID 10 MG/ML
INJECTION, SOLUTION INTRAVENOUS AS NEEDED
Status: DISCONTINUED | OUTPATIENT
Start: 2022-06-16 | End: 2022-06-16 | Stop reason: SURG

## 2022-06-16 RX ORDER — SODIUM CHLORIDE, SODIUM LACTATE, POTASSIUM CHLORIDE, CALCIUM CHLORIDE 600; 310; 30; 20 MG/100ML; MG/100ML; MG/100ML; MG/100ML
INJECTION, SOLUTION INTRAVENOUS CONTINUOUS
Status: DISCONTINUED | OUTPATIENT
Start: 2022-06-16 | End: 2022-06-17

## 2022-06-16 RX ORDER — SENNOSIDES 8.6 MG
17.2 TABLET ORAL NIGHTLY
Status: DISCONTINUED | OUTPATIENT
Start: 2022-06-16 | End: 2022-06-17

## 2022-06-16 RX ORDER — SODIUM CHLORIDE, SODIUM LACTATE, POTASSIUM CHLORIDE, CALCIUM CHLORIDE 600; 310; 30; 20 MG/100ML; MG/100ML; MG/100ML; MG/100ML
INJECTION, SOLUTION INTRAVENOUS CONTINUOUS
Status: DISCONTINUED | OUTPATIENT
Start: 2022-06-16 | End: 2022-06-16 | Stop reason: HOSPADM

## 2022-06-16 RX ORDER — NICOTINE POLACRILEX 4 MG
15 LOZENGE BUCCAL
Status: DISCONTINUED | OUTPATIENT
Start: 2022-06-16 | End: 2022-06-17

## 2022-06-16 RX ORDER — CYCLOBENZAPRINE HCL 5 MG
5 TABLET ORAL EVERY 8 HOURS PRN
Status: DISCONTINUED | OUTPATIENT
Start: 2022-06-16 | End: 2022-06-17

## 2022-06-16 RX ORDER — ROCURONIUM BROMIDE 10 MG/ML
INJECTION, SOLUTION INTRAVENOUS AS NEEDED
Status: DISCONTINUED | OUTPATIENT
Start: 2022-06-16 | End: 2022-06-16 | Stop reason: SURG

## 2022-06-16 RX ORDER — SODIUM PHOSPHATE, DIBASIC AND SODIUM PHOSPHATE, MONOBASIC 7; 19 G/133ML; G/133ML
1 ENEMA RECTAL ONCE AS NEEDED
Status: DISCONTINUED | OUTPATIENT
Start: 2022-06-16 | End: 2022-06-17

## 2022-06-16 RX ORDER — PRAVASTATIN SODIUM 20 MG
20 TABLET ORAL NIGHTLY
Status: DISCONTINUED | OUTPATIENT
Start: 2022-06-16 | End: 2022-06-17

## 2022-06-16 RX ORDER — ACETAMINOPHEN 500 MG
1000 TABLET ORAL EVERY 8 HOURS SCHEDULED
Status: DISCONTINUED | OUTPATIENT
Start: 2022-06-16 | End: 2022-06-17

## 2022-06-16 RX ORDER — CEFAZOLIN SODIUM/WATER 2 G/20 ML
2 SYRINGE (ML) INTRAVENOUS EVERY 8 HOURS
Status: COMPLETED | OUTPATIENT
Start: 2022-06-16 | End: 2022-06-17

## 2022-06-16 RX ORDER — ONDANSETRON 2 MG/ML
4 INJECTION INTRAMUSCULAR; INTRAVENOUS EVERY 6 HOURS PRN
Status: DISCONTINUED | OUTPATIENT
Start: 2022-06-16 | End: 2022-06-16 | Stop reason: HOSPADM

## 2022-06-16 RX ORDER — DIPHENHYDRAMINE HYDROCHLORIDE 50 MG/ML
12.5 INJECTION INTRAMUSCULAR; INTRAVENOUS EVERY 4 HOURS PRN
Status: DISCONTINUED | OUTPATIENT
Start: 2022-06-16 | End: 2022-06-17

## 2022-06-16 RX ORDER — NICOTINE POLACRILEX 4 MG
15 LOZENGE BUCCAL
Status: DISCONTINUED | OUTPATIENT
Start: 2022-06-16 | End: 2022-06-16 | Stop reason: HOSPADM

## 2022-06-16 RX ORDER — FAMOTIDINE 20 MG/1
20 TABLET, FILM COATED ORAL 2 TIMES DAILY
Status: DISCONTINUED | OUTPATIENT
Start: 2022-06-16 | End: 2022-06-17

## 2022-06-16 RX ORDER — FLECAINIDE ACETATE 100 MG/1
100 TABLET ORAL 2 TIMES DAILY
Status: DISCONTINUED | OUTPATIENT
Start: 2022-06-16 | End: 2022-06-17

## 2022-06-16 RX ORDER — FAMOTIDINE 20 MG/1
20 TABLET, FILM COATED ORAL ONCE
Status: COMPLETED | OUTPATIENT
Start: 2022-06-16 | End: 2022-06-16

## 2022-06-16 RX ORDER — CELECOXIB 200 MG/1
200 CAPSULE ORAL ONCE
Status: COMPLETED | OUTPATIENT
Start: 2022-06-16 | End: 2022-06-16

## 2022-06-16 RX ORDER — ASPIRIN 81 MG/1
81 TABLET, CHEWABLE ORAL DAILY
Status: DISCONTINUED | OUTPATIENT
Start: 2022-06-17 | End: 2022-06-17

## 2022-06-16 RX ORDER — KETOROLAC TROMETHAMINE 15 MG/ML
15 INJECTION, SOLUTION INTRAMUSCULAR; INTRAVENOUS EVERY 6 HOURS
Status: DISCONTINUED | OUTPATIENT
Start: 2022-06-16 | End: 2022-06-17

## 2022-06-16 RX ORDER — ONDANSETRON 2 MG/ML
4 INJECTION INTRAMUSCULAR; INTRAVENOUS EVERY 4 HOURS PRN
Status: DISCONTINUED | OUTPATIENT
Start: 2022-06-16 | End: 2022-06-17

## 2022-06-16 RX ORDER — SCOLOPAMINE TRANSDERMAL SYSTEM 1 MG/1
1 PATCH, EXTENDED RELEASE TRANSDERMAL ONCE
Status: DISCONTINUED | OUTPATIENT
Start: 2022-06-16 | End: 2022-06-17

## 2022-06-16 RX ORDER — ENOXAPARIN SODIUM 100 MG/ML
40 INJECTION SUBCUTANEOUS DAILY
Status: DISCONTINUED | OUTPATIENT
Start: 2022-06-16 | End: 2022-06-17

## 2022-06-16 RX ORDER — METOCLOPRAMIDE 10 MG/1
10 TABLET ORAL ONCE
Status: COMPLETED | OUTPATIENT
Start: 2022-06-16 | End: 2022-06-16

## 2022-06-16 RX ORDER — CEFAZOLIN SODIUM/WATER 2 G/20 ML
2 SYRINGE (ML) INTRAVENOUS ONCE
Status: COMPLETED | OUTPATIENT
Start: 2022-06-16 | End: 2022-06-16

## 2022-06-16 RX ORDER — HYDRALAZINE HYDROCHLORIDE 20 MG/ML
INJECTION INTRAMUSCULAR; INTRAVENOUS AS NEEDED
Status: DISCONTINUED | OUTPATIENT
Start: 2022-06-16 | End: 2022-06-16 | Stop reason: SURG

## 2022-06-16 RX ORDER — DEXTROSE MONOHYDRATE 25 G/50ML
50 INJECTION, SOLUTION INTRAVENOUS
Status: DISCONTINUED | OUTPATIENT
Start: 2022-06-16 | End: 2022-06-16 | Stop reason: HOSPADM

## 2022-06-16 RX ORDER — FUROSEMIDE 40 MG/1
40 TABLET ORAL EVERY MORNING
Status: DISCONTINUED | OUTPATIENT
Start: 2022-06-17 | End: 2022-06-17

## 2022-06-16 RX ORDER — FAMOTIDINE 10 MG/ML
20 INJECTION, SOLUTION INTRAVENOUS 2 TIMES DAILY
Status: DISCONTINUED | OUTPATIENT
Start: 2022-06-16 | End: 2022-06-17

## 2022-06-16 RX ORDER — MORPHINE SULFATE 10 MG/ML
6 INJECTION, SOLUTION INTRAMUSCULAR; INTRAVENOUS EVERY 10 MIN PRN
Status: DISCONTINUED | OUTPATIENT
Start: 2022-06-16 | End: 2022-06-16 | Stop reason: HOSPADM

## 2022-06-16 RX ORDER — HYDROMORPHONE HYDROCHLORIDE 1 MG/ML
0.6 INJECTION, SOLUTION INTRAMUSCULAR; INTRAVENOUS; SUBCUTANEOUS EVERY 5 MIN PRN
Status: DISCONTINUED | OUTPATIENT
Start: 2022-06-16 | End: 2022-06-16 | Stop reason: HOSPADM

## 2022-06-16 RX ORDER — OXYCODONE HYDROCHLORIDE AND ACETAMINOPHEN 5; 325 MG/1; MG/1
1 TABLET ORAL EVERY 4 HOURS PRN
Status: DISCONTINUED | OUTPATIENT
Start: 2022-06-16 | End: 2022-06-17

## 2022-06-16 RX ORDER — NICOTINE POLACRILEX 4 MG
30 LOZENGE BUCCAL
Status: DISCONTINUED | OUTPATIENT
Start: 2022-06-16 | End: 2022-06-16 | Stop reason: HOSPADM

## 2022-06-16 RX ORDER — PROCHLORPERAZINE EDISYLATE 5 MG/ML
10 INJECTION INTRAMUSCULAR; INTRAVENOUS EVERY 6 HOURS PRN
Status: DISCONTINUED | OUTPATIENT
Start: 2022-06-16 | End: 2022-06-17

## 2022-06-16 RX ORDER — BISACODYL 10 MG
10 SUPPOSITORY, RECTAL RECTAL
Status: DISCONTINUED | OUTPATIENT
Start: 2022-06-16 | End: 2022-06-17

## 2022-06-16 RX ORDER — DIPHENHYDRAMINE HCL 25 MG
25 CAPSULE ORAL EVERY 4 HOURS PRN
Status: DISCONTINUED | OUTPATIENT
Start: 2022-06-16 | End: 2022-06-17

## 2022-06-16 RX ORDER — TRAMADOL HYDROCHLORIDE 50 MG/1
50 TABLET ORAL EVERY 6 HOURS
Status: DISCONTINUED | OUTPATIENT
Start: 2022-06-16 | End: 2022-06-17

## 2022-06-16 RX ORDER — SODIUM CHLORIDE 9 MG/ML
INJECTION, SOLUTION INTRAVENOUS CONTINUOUS PRN
Status: DISCONTINUED | OUTPATIENT
Start: 2022-06-16 | End: 2022-06-16 | Stop reason: SURG

## 2022-06-16 RX ORDER — ONDANSETRON 2 MG/ML
INJECTION INTRAMUSCULAR; INTRAVENOUS AS NEEDED
Status: DISCONTINUED | OUTPATIENT
Start: 2022-06-16 | End: 2022-06-16 | Stop reason: SURG

## 2022-06-16 RX ORDER — HYDROMORPHONE HYDROCHLORIDE 1 MG/ML
0.4 INJECTION, SOLUTION INTRAMUSCULAR; INTRAVENOUS; SUBCUTANEOUS EVERY 5 MIN PRN
Status: DISCONTINUED | OUTPATIENT
Start: 2022-06-16 | End: 2022-06-16 | Stop reason: HOSPADM

## 2022-06-16 RX ORDER — DIPHENHYDRAMINE HYDROCHLORIDE 50 MG/ML
25 INJECTION INTRAMUSCULAR; INTRAVENOUS ONCE AS NEEDED
Status: ACTIVE | OUTPATIENT
Start: 2022-06-16 | End: 2022-06-16

## 2022-06-16 RX ORDER — METOCLOPRAMIDE HYDROCHLORIDE 5 MG/ML
10 INJECTION INTRAMUSCULAR; INTRAVENOUS EVERY 8 HOURS PRN
Status: DISCONTINUED | OUTPATIENT
Start: 2022-06-16 | End: 2022-06-16 | Stop reason: HOSPADM

## 2022-06-16 RX ORDER — TRANEXAMIC ACID 10 MG/ML
1000 INJECTION, SOLUTION INTRAVENOUS ONCE
Status: COMPLETED | OUTPATIENT
Start: 2022-06-17 | End: 2022-06-16

## 2022-06-16 RX ORDER — PAROXETINE HYDROCHLORIDE 20 MG/1
40 TABLET, FILM COATED ORAL NIGHTLY
Status: DISCONTINUED | OUTPATIENT
Start: 2022-06-16 | End: 2022-06-17

## 2022-06-16 RX ORDER — METOPROLOL SUCCINATE 50 MG/1
50 TABLET, EXTENDED RELEASE ORAL 2 TIMES DAILY
Status: DISCONTINUED | OUTPATIENT
Start: 2022-06-16 | End: 2022-06-17

## 2022-06-16 RX ORDER — POLYETHYLENE GLYCOL 3350 17 G/17G
17 POWDER, FOR SOLUTION ORAL DAILY PRN
Status: DISCONTINUED | OUTPATIENT
Start: 2022-06-16 | End: 2022-06-17

## 2022-06-16 RX ORDER — NALOXONE HYDROCHLORIDE 0.4 MG/ML
80 INJECTION, SOLUTION INTRAMUSCULAR; INTRAVENOUS; SUBCUTANEOUS AS NEEDED
Status: DISCONTINUED | OUTPATIENT
Start: 2022-06-16 | End: 2022-06-16 | Stop reason: HOSPADM

## 2022-06-16 RX ORDER — DEXAMETHASONE SODIUM PHOSPHATE 4 MG/ML
VIAL (ML) INJECTION AS NEEDED
Status: DISCONTINUED | OUTPATIENT
Start: 2022-06-16 | End: 2022-06-16 | Stop reason: SURG

## 2022-06-16 RX ORDER — NICOTINE POLACRILEX 4 MG
30 LOZENGE BUCCAL
Status: DISCONTINUED | OUTPATIENT
Start: 2022-06-16 | End: 2022-06-17

## 2022-06-16 RX ORDER — LIDOCAINE HYDROCHLORIDE 10 MG/ML
INJECTION, SOLUTION EPIDURAL; INFILTRATION; INTRACAUDAL; PERINEURAL AS NEEDED
Status: DISCONTINUED | OUTPATIENT
Start: 2022-06-16 | End: 2022-06-16 | Stop reason: SURG

## 2022-06-16 RX ORDER — MORPHINE SULFATE 4 MG/ML
2 INJECTION, SOLUTION INTRAMUSCULAR; INTRAVENOUS EVERY 10 MIN PRN
Status: DISCONTINUED | OUTPATIENT
Start: 2022-06-16 | End: 2022-06-16 | Stop reason: HOSPADM

## 2022-06-16 RX ORDER — ACETAMINOPHEN 500 MG
1000 TABLET ORAL ONCE
Status: COMPLETED | OUTPATIENT
Start: 2022-06-16 | End: 2022-06-16

## 2022-06-16 RX ORDER — MORPHINE SULFATE 4 MG/ML
4 INJECTION, SOLUTION INTRAMUSCULAR; INTRAVENOUS EVERY 10 MIN PRN
Status: DISCONTINUED | OUTPATIENT
Start: 2022-06-16 | End: 2022-06-16 | Stop reason: HOSPADM

## 2022-06-16 RX ORDER — DEXTROSE MONOHYDRATE 25 G/50ML
50 INJECTION, SOLUTION INTRAVENOUS
Status: DISCONTINUED | OUTPATIENT
Start: 2022-06-16 | End: 2022-06-17

## 2022-06-16 RX ORDER — HYDROMORPHONE HYDROCHLORIDE 1 MG/ML
0.2 INJECTION, SOLUTION INTRAMUSCULAR; INTRAVENOUS; SUBCUTANEOUS EVERY 5 MIN PRN
Status: DISCONTINUED | OUTPATIENT
Start: 2022-06-16 | End: 2022-06-16 | Stop reason: HOSPADM

## 2022-06-16 RX ORDER — METOPROLOL TARTRATE 5 MG/5ML
2.5 INJECTION INTRAVENOUS ONCE
Status: DISCONTINUED | OUTPATIENT
Start: 2022-06-16 | End: 2022-06-16 | Stop reason: HOSPADM

## 2022-06-16 RX ORDER — WARFARIN SODIUM 5 MG/1
5 TABLET ORAL NIGHTLY
Status: DISCONTINUED | OUTPATIENT
Start: 2022-06-16 | End: 2022-06-17

## 2022-06-16 RX ORDER — ACETAMINOPHEN 500 MG
1000 TABLET ORAL ONCE
Status: DISCONTINUED | OUTPATIENT
Start: 2022-06-16 | End: 2022-06-16 | Stop reason: HOSPADM

## 2022-06-16 RX ORDER — DOCUSATE SODIUM 100 MG/1
100 CAPSULE, LIQUID FILLED ORAL 2 TIMES DAILY
Status: DISCONTINUED | OUTPATIENT
Start: 2022-06-16 | End: 2022-06-17

## 2022-06-16 RX ADMIN — SODIUM CHLORIDE: 9 INJECTION, SOLUTION INTRAVENOUS at 10:37:00

## 2022-06-16 RX ADMIN — LIDOCAINE HYDROCHLORIDE 50 MG: 10 INJECTION, SOLUTION EPIDURAL; INFILTRATION; INTRACAUDAL; PERINEURAL at 10:37:00

## 2022-06-16 RX ADMIN — ONDANSETRON 4 MG: 2 INJECTION INTRAMUSCULAR; INTRAVENOUS at 11:48:00

## 2022-06-16 RX ADMIN — TRANEXAMIC ACID 1000 MG: 10 INJECTION, SOLUTION INTRAVENOUS at 11:02:00

## 2022-06-16 RX ADMIN — DEXAMETHASONE SODIUM PHOSPHATE 4 MG: 4 MG/ML VIAL (ML) INJECTION at 11:48:00

## 2022-06-16 RX ADMIN — CEFAZOLIN SODIUM/WATER 2 G: 2 G/20 ML SYRINGE (ML) INTRAVENOUS at 10:51:00

## 2022-06-16 RX ADMIN — HYDRALAZINE HYDROCHLORIDE 10 MG: 20 INJECTION INTRAMUSCULAR; INTRAVENOUS at 11:43:00

## 2022-06-16 RX ADMIN — ROCURONIUM BROMIDE 70 MG: 10 INJECTION, SOLUTION INTRAVENOUS at 10:48:00

## 2022-06-16 NOTE — ANESTHESIA PROCEDURE NOTES
Airway  Date/Time: 6/16/2022 10:38 AM  Urgency: Elective    Airway not difficult    General Information and Staff    Patient location during procedure: OR  Anesthesiologist: Arik Garcia MD  Performed: anesthesiologist     Indications and Patient Condition  Indications for airway management: anesthesia  Sedation level: deep  Preoxygenated: yes  Patient position: sniffing  Mask difficulty assessment: 1 - vent by mask    Final Airway Details  Final airway type: endotracheal airway      Successful airway: ETT  Cuffed: yes   Successful intubation technique: Video laryngoscopy  Endotracheal tube insertion site: oral  Blade: GlideScope  Blade size: #4  ETT size (mm): 7.5    Cormack-Lehane Classification: grade I - full view of glottis  Placement verified by: chest auscultation and capnometry   Measured from: teeth  ETT to teeth (cm): 21  Number of attempts at approach: 1    Additional Comments  Atx x 1

## 2022-06-16 NOTE — CM/SW NOTE
Department  notified of request for christie Lira referrals started. Assigned CM/SW to follow up with pt/family on further discharge planning.     Buck Guzmán  [facility]

## 2022-06-16 NOTE — ANESTHESIA POSTPROCEDURE EVALUATION
Patient: Selam Huber    Procedure Summary     Date: 06/16/22 Room / Location: [address] [place] [address] MAIN OR 12 / [address] [place] [address] MAIN OR    Anesthesia Start: 2519 Anesthesia Stop: 6373    Procedure: right total hip arthroplasty anterior approach (Right Hip) Diagnosis: (primary osteoarthritis of right hip)    Surgeons: Erik Bingham DO Anesthesiologist: Jessica Duffy MD    Anesthesia Type: general ASA Status: 3          Anesthesia Type: general    Vitals Value Taken Time   /60 06/16/22 1434   Temp  06/16/22 1437   Pulse 76 06/16/22 1437   Resp 13 06/16/22 1437   SpO2 96 % 06/16/22 1437   Vitals shown include unvalidated device data.     [address] [place] [address] AN Post Evaluation    Jewel Whelan MD  6/16/2022 2:37 PM

## 2022-06-17 VITALS
HEIGHT: 67 IN | SYSTOLIC BLOOD PRESSURE: 141 MMHG | HEART RATE: 77 BPM | WEIGHT: 244 LBS | RESPIRATION RATE: 20 BRPM | BODY MASS INDEX: 38.3 KG/M2 | TEMPERATURE: 98 F | DIASTOLIC BLOOD PRESSURE: 80 MMHG | OXYGEN SATURATION: 97 %

## 2022-06-17 LAB
GLUCOSE BLDC GLUCOMTR-MCNC: 154 MG/DL (ref 70–99)
HCT VFR BLD AUTO: 35.7 %
HGB BLD-MCNC: 11.4 G/DL

## 2022-06-17 PROCEDURE — 97110 THERAPEUTIC EXERCISES: CPT

## 2022-06-17 PROCEDURE — 82962 GLUCOSE BLOOD TEST: CPT

## 2022-06-17 PROCEDURE — 97116 GAIT TRAINING THERAPY: CPT

## 2022-06-17 PROCEDURE — 85014 HEMATOCRIT: CPT | Performed by: ORTHOPAEDIC SURGERY

## 2022-06-17 PROCEDURE — 97165 OT EVAL LOW COMPLEX 30 MIN: CPT

## 2022-06-17 PROCEDURE — 94660 CPAP INITIATION&MGMT: CPT

## 2022-06-17 PROCEDURE — 97535 SELF CARE MNGMENT TRAINING: CPT

## 2022-06-17 PROCEDURE — 97530 THERAPEUTIC ACTIVITIES: CPT

## 2022-06-17 PROCEDURE — 85018 HEMOGLOBIN: CPT | Performed by: ORTHOPAEDIC SURGERY

## 2022-06-17 NOTE — CM/SW NOTE
CM received MDO for dc planning. Pt is outpt in a bed status in 1E. CM spoke to  [facility] RN whom states that pts pre-op dc plan is to return home with [facility]. [facility] referrals sent via Aidin. F2F entered. [facility]/ [facility] liaison provided choice  Pt has chosen [facility]. [facility] reserved via [facility]. Plan: Home with [facility] when stable. / to remain available for support and/or discharge planning. Quinn Cobb.  David Arce RN, BSN  Nurse   [phone]

## 2022-06-17 NOTE — PLAN OF CARE
Arlene Escoto is stable for discharge. reviewed d/c instructions and reviewed meds. all ques answered. reviewed incision care- to remove dressing on 7th day. Vlad Morgan arranged w [facility]. educated on pain med schedule and side effects-written schedule given- walker issued. MD follow up reviewed. S/S infection reviewed. Saline locks removed. Daughter here to transport home.  stable  Problem: DISCHARGE PLANNING  Goal: Discharge to home or other facility with appropriate resources  Description: INTERVENTIONS:  - Identify barriers to discharge w/pt and caregiver  - Include patient/family/discharge partner in discharge planning  - Arrange for needed discharge resources and transportation as appropriate  - Identify discharge learning needs (meds, wound care, etc)  - Arrange for interpreters to assist at discharge as needed  - Consider post-discharge preferences of patient/family/discharge partner  - Complete POLST form as appropriate  - Assess patient's ability to be responsible for managing their own health  - Refer to Case Management Department for coordinating discharge planning if the patient needs post-hospital services based on physician/LIP order or complex needs related to functional status, cognitive ability or social support system  Outcome: Progressing

## 2022-06-17 NOTE — HOME CARE LIAISON
Received referral via Aidin for Home Health services. Spoke w/ patient and provided with list of [facility] providers from [facility], choice is [facility]. Agency reserved in [facility] and contact information placed on AVS.Financial interest disclosure provided. Notified NANCY/Barbara & BREE/Nina MOORE

## 2022-06-17 NOTE — CM/SW NOTE
06/17/22 0900   Discharge disposition   Expected discharge disposition Home-Health   Post Acute Care Provider [facility]   Discharge transportation Private car     Pt discussed during nursing rounds. Pt is stable for dc today. MD dc order entered. [facility] notified of F2F order and dc today via secure chat. Plan: Home today w/ [facility]. / to remain available for support and/or discharge planning. Shahzad Borden.  Dario Wiggins RN, BSN  Nurse   [phone]

## 2022-08-14 NOTE — PROCEDURES
Presents to ED via EMS from home for c/o chest pain, hx of MI and CABGx2.   [address]    FEMORAL AND OBTURATOR ARTICULAR NERVE BLOCKS  NAME:  Matthew Stanford    MR #:    [id number] :  10/6/1945     PHYSICIAN:  Nelson Lala MD        Operative Report    DATE OF PROCEDURE: 5/3/2022   PREOPERATIVE DIAGNOSES: 1. Right hip pain    2. Primary osteoarthritis of right hip: moderate-severe        POSTOPERATIVE DIAGNOSES:   1. Right hip pain    2. Primary osteoarthritis of right hip: moderate-severe        PROCEDURES: right femoral and obturator articular nerve blocks done under fluoroscopic guidance with contrast enhancement. SURGEON: Nelson Goldman MD   ANESTHESIA: Local   INDICATIONS:      OPERATIVE PROCEDURE:  Written consent was obtained from the patient. The patient was brought into the operating room and placed in the supine position on the fluoroscopy table . The patient's skin was cleaned and draped in a normal sterile fashion. Using AP fluoroscopy, the left hip was identified and the locations of the articular branches of the obturator and femoral nerves were identified on the inferior and medial and superior aspects of the hip joint capsule respectively. Using ultrasound guidance the left femoral artery, nerve and vein were identified and marks were placed on the patient's skin overlying these structures to make sure all needle tracts were away from these structures. Then 22 gauge needles were inserted after his skin was anesthetized with 1-2 ml of 1% PF Lidocaine without epinephrine. When they felt to be in good position, Omnipaque 240 contrast was used to obtain a good fascial pattern. Then each site was anesthetized  with 0.6 ml of 0.5% PF Marcaine without epinephrine. After this, the needles were removed. The patient's skin was cleaned. Band-Aids were applied. The patient was transferred to the cart and into [facility]. The patient was given discharge instructions and will follow up in the clinic as scheduled.   Throughout the whole procedure, the patient's pulse oximetry and vital signs were monitored and they remained completely stable. Also, throughout the whole procedure, prior to injection of any medication, aspiration was performed. No blood, fluid, or air was aspirated at anytime.

## 2022-09-16 ENCOUNTER — HOSPITAL ENCOUNTER (OUTPATIENT)
Dept: GENERAL RADIOLOGY | Age: 77
Discharge: HOME | End: 2022-09-16
Attending: INTERNAL MEDICINE

## 2022-09-16 DIAGNOSIS — R52 PAIN: ICD-10-CM

## 2022-09-16 PROCEDURE — 73140 X-RAY EXAM OF FINGER(S): CPT | Performed by: INTERNAL MEDICINE

## 2022-11-16 ENCOUNTER — TELEPHONE (OUTPATIENT)
Facility: CLINIC | Age: 77
End: 2022-11-16

## 2022-11-16 DIAGNOSIS — Z79.01 CHRONIC ANTICOAGULATION: Primary | ICD-10-CM

## 2022-11-16 NOTE — TELEPHONE ENCOUNTER
Patient is calling to request updated prep instructions be sent to his home address listed also if possible to be sent via mychart incase his doesn't receive instructions in time.

## 2022-11-22 ENCOUNTER — TELEPHONE (OUTPATIENT)
Facility: CLINIC | Age: 77
End: 2022-11-22

## 2022-11-22 NOTE — TELEPHONE ENCOUNTER
Patient wanted to know if ok to use prep sent back in August 2021. He was able to find expiration date which isn't until 2024 and I let him know ok to use just as long unopened/not mixed with water. I also reminded him of how long to hold coumadin before procedure. All questions answered.

## 2022-11-27 ENCOUNTER — LAB ENCOUNTER (OUTPATIENT)
Dept: LAB | Facility: HOSPITAL | Age: 77
End: 2022-11-27
Attending: UROLOGY
Payer: MEDICARE

## 2022-11-27 DIAGNOSIS — C61 PROSTATIC CANCER (HCC): Primary | ICD-10-CM

## 2022-11-27 LAB
PSA FREE SERPL-MCNC: <0.02 NG/ML
PSA SERPL-MCNC: 0.12 NG/ML (ref ?–4)

## 2022-11-27 PROCEDURE — 84153 ASSAY OF PSA TOTAL: CPT

## 2022-11-27 PROCEDURE — 84154 ASSAY OF PSA FREE: CPT

## 2022-11-27 PROCEDURE — 36415 COLL VENOUS BLD VENIPUNCTURE: CPT

## 2022-11-30 ENCOUNTER — TELEPHONE (OUTPATIENT)
Facility: CLINIC | Age: 77
End: 2022-11-30

## 2022-12-01 ENCOUNTER — ANESTHESIA (OUTPATIENT)
Dept: ENDOSCOPY | Facility: HOSPITAL | Age: 77
End: 2022-12-01
Payer: MEDICARE

## 2022-12-01 ENCOUNTER — ANESTHESIA EVENT (OUTPATIENT)
Dept: ENDOSCOPY | Facility: HOSPITAL | Age: 77
End: 2022-12-01
Payer: MEDICARE

## 2022-12-01 ENCOUNTER — HOSPITAL ENCOUNTER (OUTPATIENT)
Facility: HOSPITAL | Age: 77
Setting detail: HOSPITAL OUTPATIENT SURGERY
Discharge: HOME | End: 2022-12-01
Attending: INTERNAL MEDICINE | Admitting: INTERNAL MEDICINE
Payer: MEDICARE

## 2022-12-01 VITALS
WEIGHT: 235 LBS | RESPIRATION RATE: 14 BRPM | TEMPERATURE: 98 F | SYSTOLIC BLOOD PRESSURE: 118 MMHG | HEART RATE: 64 BPM | OXYGEN SATURATION: 92 % | BODY MASS INDEX: 36.88 KG/M2 | HEIGHT: 67 IN | DIASTOLIC BLOOD PRESSURE: 74 MMHG

## 2022-12-01 DIAGNOSIS — Z86.010 HISTORY OF COLON POLYPS: ICD-10-CM

## 2022-12-01 LAB
GLUCOSE BLDC GLUCOMTR-MCNC: 150 MG/DL (ref 70–99)
INR BLD: 1.3 (ref 0.85–1.16)
PROTHROMBIN TIME: 16 SECONDS (ref 11.6–14.8)

## 2022-12-01 PROCEDURE — 45385 COLONOSCOPY W/LESION REMOVAL: CPT | Performed by: INTERNAL MEDICINE

## 2022-12-01 PROCEDURE — 0DBH8ZX EXCISION OF CECUM, VIA NATURAL OR ARTIFICIAL OPENING ENDOSCOPIC, DIAGNOSTIC: ICD-10-PCS | Performed by: INTERNAL MEDICINE

## 2022-12-01 PROCEDURE — 0DBL8ZX EXCISION OF TRANSVERSE COLON, VIA NATURAL OR ARTIFICIAL OPENING ENDOSCOPIC, DIAGNOSTIC: ICD-10-PCS | Performed by: INTERNAL MEDICINE

## 2022-12-01 PROCEDURE — 45380 COLONOSCOPY AND BIOPSY: CPT | Performed by: INTERNAL MEDICINE

## 2022-12-01 PROCEDURE — 0DBK8ZX EXCISION OF ASCENDING COLON, VIA NATURAL OR ARTIFICIAL OPENING ENDOSCOPIC, DIAGNOSTIC: ICD-10-PCS | Performed by: INTERNAL MEDICINE

## 2022-12-01 RX ORDER — NICOTINE POLACRILEX 4 MG
30 LOZENGE BUCCAL
Status: DISCONTINUED | OUTPATIENT
Start: 2022-12-01 | End: 2022-12-01

## 2022-12-01 RX ORDER — NICOTINE POLACRILEX 4 MG
15 LOZENGE BUCCAL
Status: DISCONTINUED | OUTPATIENT
Start: 2022-12-01 | End: 2022-12-01

## 2022-12-01 RX ORDER — AMPICILLIN 1 G/1
2 INJECTION, POWDER, FOR SOLUTION INTRAMUSCULAR; INTRAVENOUS ONCE
Status: COMPLETED | OUTPATIENT
Start: 2022-12-01 | End: 2022-12-01

## 2022-12-01 RX ORDER — DEXTROSE MONOHYDRATE 25 G/50ML
50 INJECTION, SOLUTION INTRAVENOUS
Status: DISCONTINUED | OUTPATIENT
Start: 2022-12-01 | End: 2022-12-01

## 2022-12-01 RX ORDER — SODIUM CHLORIDE, SODIUM LACTATE, POTASSIUM CHLORIDE, CALCIUM CHLORIDE 600; 310; 30; 20 MG/100ML; MG/100ML; MG/100ML; MG/100ML
INJECTION, SOLUTION INTRAVENOUS CONTINUOUS
Status: DISCONTINUED | OUTPATIENT
Start: 2022-12-01 | End: 2022-12-01

## 2022-12-01 RX ORDER — NALOXONE HYDROCHLORIDE 0.4 MG/ML
80 INJECTION, SOLUTION INTRAMUSCULAR; INTRAVENOUS; SUBCUTANEOUS AS NEEDED
Status: DISCONTINUED | OUTPATIENT
Start: 2022-12-01 | End: 2022-12-01

## 2022-12-01 RX ORDER — LIDOCAINE HYDROCHLORIDE 10 MG/ML
INJECTION, SOLUTION EPIDURAL; INFILTRATION; INTRACAUDAL; PERINEURAL AS NEEDED
Status: DISCONTINUED | OUTPATIENT
Start: 2022-12-01 | End: 2022-12-01 | Stop reason: SURG

## 2022-12-01 RX ADMIN — AMPICILLIN 2 G: 1 INJECTION, POWDER, FOR SOLUTION INTRAMUSCULAR; INTRAVENOUS at 11:32:00

## 2022-12-01 RX ADMIN — SODIUM CHLORIDE, SODIUM LACTATE, POTASSIUM CHLORIDE, CALCIUM CHLORIDE: 600; 310; 30; 20 INJECTION, SOLUTION INTRAVENOUS at 11:27:00

## 2022-12-01 RX ADMIN — LIDOCAINE HYDROCHLORIDE 50 MG: 10 INJECTION, SOLUTION EPIDURAL; INFILTRATION; INTRACAUDAL; PERINEURAL at 11:30:00

## 2022-12-01 RX ADMIN — SODIUM CHLORIDE, SODIUM LACTATE, POTASSIUM CHLORIDE, CALCIUM CHLORIDE: 600; 310; 30; 20 INJECTION, SOLUTION INTRAVENOUS at 12:08:00

## 2022-12-01 NOTE — DISCHARGE INSTRUCTIONS

## 2022-12-01 NOTE — OPERATIVE REPORT
[facility] Endoscopy Report      Preoperative Diagnosis:  - colon cancer screening  - history of colon polyps      Postoperative Diagnosis:  - colon polyps x 7  - diverticulosis  - small internal hemorrhoids      Procedure:    Colonoscopy     Surgeon:  Gianna Aguilar M.D. Anesthesia:  MAC     Technique:  After informed consent, the patient was placed in the left lateral recumbent position. Digital rectal examination revealed no palpable intraluminal abnormalities. An Olympus variable stiffness 190 series HD colonoscope was inserted into the rectum and advanced under direct vision by following the lumen to the cecum. The colon was examined upon withdrawal in the left lateral position. The procedure was well tolerated without immediate complication. Findings:  The preparation of the colon was good. The terminal ileum was examined for 4 cm and visually normal.  The ileocecal valve was well preserved. The visualized colonic mucosa from the cecum to the anal verge was normal with an intact vascular pattern. Colon polyps x7 removed as follows;  -Cecum x1, diminutive removed by cold forceps technique.  -Ascending x1, diminutive removed by cold forceps technique  -Transverse x5, each these polyps were sessile and ranged in size from 3 to 5 mm. All were removed by cold snare technique. Slight oozing was noted from one of the polypectomy sites and 2 colon clips were placed across the site with excellent hemostasis. All polypectomy sites inspected and found to be free of bleeding and specimens retrieved and sent for analysis. Diverticulosis located in the sigmoid colon with scattered more rare diverticula noted more proximally for pandiverticulosis. Small internal hemorrhoids noted.     Estimated blood loss-insignificant  Specimens-colon polyps      Impression:  - colon polyps x 7  - diverticulosis  - small internal hemorrhoids    Recommendations:  - Post polypectomy instructions given  - Repeat colonoscopy in 3 years  - High fiber diet for diverticular disease  - Symptomatic treatment of hemorrhoids  - Ok to restart coumadin, follow up with managing provider for input on dosing           Janusz Frost.  Shira Justin MD  12/1/2022  12:05 PM

## 2022-12-07 ENCOUNTER — TELEPHONE (OUTPATIENT)
Facility: CLINIC | Age: 77
End: 2022-12-07

## 2022-12-07 NOTE — TELEPHONE ENCOUNTER
----- Message from Darren Ma MD sent at 12/2/2022  5:39 PM CST -----  I wanted to get back to you with your colonoscopy results. You had 7 colon polyps removed which were benign. I would advise a repeat colonoscopy in 3 years to make sure no new polyps are forming. You also have internal hemorrhoids and diverticulosis. Please stay on a high fiber diet and call with any questions.

## 2022-12-07 NOTE — TELEPHONE ENCOUNTER
3 year colonoscopy recall entered. Health maintenance updated. Colonoscopy done on 12/1/22 and next due on 12/1/25.

## 2023-03-16 ENCOUNTER — HOSPITAL ENCOUNTER (OUTPATIENT)
Age: 78
Discharge: ANOTHER HOSPITAL | End: 2023-03-16
Payer: MEDICARE

## 2023-03-16 ENCOUNTER — APPOINTMENT (OUTPATIENT)
Dept: CT IMAGING | Facility: HOSPITAL | Age: 78
End: 2023-03-16
Attending: EMERGENCY MEDICINE
Payer: MEDICARE

## 2023-03-16 ENCOUNTER — HOSPITAL ENCOUNTER (EMERGENCY)
Facility: HOSPITAL | Age: 78
Discharge: HOME | End: 2023-03-16
Attending: EMERGENCY MEDICINE
Payer: MEDICARE

## 2023-03-16 VITALS
BODY MASS INDEX: 36.88 KG/M2 | WEIGHT: 235 LBS | OXYGEN SATURATION: 95 % | HEIGHT: 67 IN | RESPIRATION RATE: 20 BRPM | TEMPERATURE: 98 F | HEART RATE: 73 BPM | SYSTOLIC BLOOD PRESSURE: 163 MMHG | DIASTOLIC BLOOD PRESSURE: 67 MMHG

## 2023-03-16 VITALS
SYSTOLIC BLOOD PRESSURE: 138 MMHG | TEMPERATURE: 97 F | DIASTOLIC BLOOD PRESSURE: 66 MMHG | HEART RATE: 77 BPM | RESPIRATION RATE: 18 BRPM | OXYGEN SATURATION: 96 %

## 2023-03-16 DIAGNOSIS — S30.1XXA CONTUSION OF FLANK, INITIAL ENCOUNTER: Primary | ICD-10-CM

## 2023-03-16 DIAGNOSIS — U07.1 COVID: ICD-10-CM

## 2023-03-16 DIAGNOSIS — Z20.822 ENCOUNTER FOR LABORATORY TESTING FOR COVID-19 VIRUS: Primary | ICD-10-CM
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
## 2023-03-16 DIAGNOSIS — R10.9 ABDOMINAL PAIN, ACUTE: ICD-10-CM

## 2023-03-16 DIAGNOSIS — U07.1 COVID-19: ICD-10-CM

## 2023-03-16 LAB
ANION GAP SERPL CALC-SCNC: 6 MMOL/L (ref 0–18)
APTT PPP: 32.9 SECONDS (ref 23.3–35.6)
ATRIAL RATE: 77 BPM
BASOPHILS # BLD AUTO: 0.06 X10(3) UL (ref 0–0.2)
BASOPHILS NFR BLD AUTO: 0.7 %
BILIRUB UR QL: NEGATIVE
BUN BLD-MCNC: 17 MG/DL (ref 7–18)
BUN/CREAT SERPL: 14.8 (ref 10–20)
CALCIUM BLD-MCNC: 9.5 MG/DL (ref 8.5–10.1)
CHLORIDE SERPL-SCNC: 103 MMOL/L (ref 98–112)
CLARITY UR: CLEAR
CO2 SERPL-SCNC: 29 MMOL/L (ref 21–32)
COLOR UR: YELLOW
CREAT BLD-MCNC: 1.15 MG/DL
DEPRECATED RDW RBC AUTO: 50.2 FL (ref 35.1–46.3)
EOSINOPHIL # BLD AUTO: 0.08 X10(3) UL (ref 0–0.7)
EOSINOPHIL NFR BLD AUTO: 0.9 %
ERYTHROCYTE [DISTWIDTH] IN BLOOD BY AUTOMATED COUNT: 14.8 % (ref 11–15)
GFR SERPLBLD BASED ON 1.73 SQ M-ARVRAT: 66 ML/MIN/1.73M2 (ref 60–?)
GLUCOSE BLD-MCNC: 135 MG/DL (ref 70–99)
GLUCOSE UR-MCNC: NEGATIVE MG/DL
HCT VFR BLD AUTO: 40.7 %
HGB BLD-MCNC: 13.5 G/DL
HGB UR QL STRIP.AUTO: NEGATIVE
IMM GRANULOCYTES # BLD AUTO: 0.02 X10(3) UL (ref 0–1)
IMM GRANULOCYTES NFR BLD: 0.2 %
INR BLD: 1.76 (ref 0.85–1.16)
KETONES UR-MCNC: NEGATIVE MG/DL
LEUKOCYTE ESTERASE UR QL STRIP.AUTO: NEGATIVE
LYMPHOCYTES # BLD AUTO: 2.5 X10(3) UL (ref 1–4)
LYMPHOCYTES NFR BLD AUTO: 28.6 %
MCH RBC QN AUTO: 30.6 PG (ref 26–34)
MCHC RBC AUTO-ENTMCNC: 33.2 G/DL (ref 31–37)
MCV RBC AUTO: 92.3 FL
MONOCYTES # BLD AUTO: 0.98 X10(3) UL (ref 0.1–1)
MONOCYTES NFR BLD AUTO: 11.2 %
NEUTROPHILS # BLD AUTO: 5.1 X10 (3) UL (ref 1.5–7.7)
NEUTROPHILS # BLD AUTO: 5.1 X10(3) UL (ref 1.5–7.7)
NEUTROPHILS NFR BLD AUTO: 58.4 %
NITRITE UR QL STRIP.AUTO: NEGATIVE
OSMOLALITY SERPL CALC.SUM OF ELEC: 290 MOSM/KG (ref 275–295)
P AXIS: 65 DEGREES
P-R INTERVAL: 300 MS
PH UR: 5 [PH] (ref 5–8)
PLATELET # BLD AUTO: 212 10(3)UL (ref 150–450)
POTASSIUM SERPL-SCNC: 3.8 MMOL/L (ref 3.5–5.1)
PROT UR-MCNC: NEGATIVE MG/DL
PROTHROMBIN TIME: 20.2 SECONDS (ref 11.6–14.8)
Q-T INTERVAL: 428 MS
QRS DURATION: 116 MS
QTC CALCULATION (BEZET): 484 MS
R AXIS: -20 DEGREES
RBC # BLD AUTO: 4.41 X10(6)UL
SARS-COV-2 RNA RESP QL NAA+PROBE: DETECTED
SODIUM SERPL-SCNC: 138 MMOL/L (ref 136–145)
SP GR UR STRIP: 1.02 (ref 1–1.03)
T AXIS: 36 DEGREES
TROPONIN I HIGH SENSITIVITY: 24 NG/L
UROBILINOGEN UR STRIP-ACNC: <2
VENTRICULAR RATE: 77 BPM
VIT C UR-MCNC: NEGATIVE MG/DL
WBC # BLD AUTO: 8.7 X10(3) UL (ref 4–11)

## 2023-03-16 PROCEDURE — 71260 CT THORAX DX C+: CPT | Performed by: EMERGENCY MEDICINE

## 2023-03-16 PROCEDURE — 93010 ELECTROCARDIOGRAM REPORT: CPT

## 2023-03-16 PROCEDURE — 36415 COLL VENOUS BLD VENIPUNCTURE: CPT

## 2023-03-16 PROCEDURE — 81003 URINALYSIS AUTO W/O SCOPE: CPT | Performed by: EMERGENCY MEDICINE

## 2023-03-16 PROCEDURE — 85610 PROTHROMBIN TIME: CPT | Performed by: EMERGENCY MEDICINE

## 2023-03-16 PROCEDURE — 80048 BASIC METABOLIC PNL TOTAL CA: CPT | Performed by: EMERGENCY MEDICINE

## 2023-03-16 PROCEDURE — 99284 EMERGENCY DEPT VISIT MOD MDM: CPT

## 2023-03-16 PROCEDURE — 70450 CT HEAD/BRAIN W/O DYE: CPT | Performed by: EMERGENCY MEDICINE

## 2023-03-16 PROCEDURE — 74177 CT ABD & PELVIS W/CONTRAST: CPT | Performed by: EMERGENCY MEDICINE

## 2023-03-16 PROCEDURE — 84484 ASSAY OF TROPONIN QUANT: CPT | Performed by: EMERGENCY MEDICINE

## 2023-03-16 PROCEDURE — 99285 EMERGENCY DEPT VISIT HI MDM: CPT

## 2023-03-16 PROCEDURE — 93005 ELECTROCARDIOGRAM TRACING: CPT

## 2023-03-16 PROCEDURE — 85025 COMPLETE CBC W/AUTO DIFF WBC: CPT | Performed by: EMERGENCY MEDICINE

## 2023-03-16 PROCEDURE — 99214 OFFICE O/P EST MOD 30 MIN: CPT | Performed by: PHYSICIAN ASSISTANT

## 2023-03-16 PROCEDURE — 85730 THROMBOPLASTIN TIME PARTIAL: CPT | Performed by: EMERGENCY MEDICINE

## 2023-03-16 PROCEDURE — U0002 COVID-19 LAB TEST NON-CDC: HCPCS | Performed by: PHYSICIAN ASSISTANT

## 2023-03-16 RX ORDER — HYDROCODONE BITARTRATE AND ACETAMINOPHEN 5; 325 MG/1; MG/1
1 TABLET ORAL EVERY 6 HOURS PRN
Qty: 10 TABLET | Refills: 0 | Status: SHIPPED | OUTPATIENT
Start: 2023-03-16

## 2023-03-16 NOTE — ED INITIAL ASSESSMENT (HPI)
Pt c/o falling yesterday and hitting a marble table on his right flank and pain radiates to upper abd. Pt is worried about internal bleeding. Pt also tested + for covid at [address] [place] [address] before coming in ER. Pt states that for the past \"while\" he has had syncope and is having trouble keeping his balance.  Pt taking thinners

## 2023-07-18 NOTE — LETTER
5/11/2022      Jack Chacon MD  Physical Medicine and Rehabilitation  [address] [address]  Dept: [phone]  Dept Fax: [phone]        RE: Consultation for Paulo Garcia        Dear Ludy Stevens MD,    Thank you very much for the opportunity to see your patient. Attached please find a summary from your patient's recent visit. I appreciate the chance to take care of your patient with you. Please feel free to call me with any questions or concerns. Sincerely,        Bentley Robbins.  Julián Chacon MD  Electronically Signed on 5/11/2022 Please get your tetanus shot at your pharmacy    Please ask the pharmacy about coverage for [facility]       Patient Education   Personalized Prevention Plan  You are due for the preventive services outlined below.  Your care team is available to assist you in scheduling these services.  If you have already completed any of these items, please share that information with your care team to update in your medical record.  Health Maintenance Due   Topic Date Due    AORTIC ANEURYSM SCREENING (SYSTEM ASSIGNED)  Never done    Annual Wellness Visit  11/29/2022    COVID-19 Vaccine (6 - Moderna series) 03/22/2023    Diptheria Tetanus Pertussis (DTAP/TDAP/TD) Vaccine (2 - Td or Tdap) 07/22/2023

## 2023-08-03 ENCOUNTER — HOSPITAL ENCOUNTER (OUTPATIENT)
Age: 78
Discharge: HOME | End: 2023-08-03
Payer: MEDICARE

## 2023-08-03 ENCOUNTER — APPOINTMENT (OUTPATIENT)
Dept: ULTRASOUND IMAGING | Age: 78
End: 2023-08-03
Attending: NURSE PRACTITIONER
Payer: MEDICARE

## 2023-08-03 VITALS
SYSTOLIC BLOOD PRESSURE: 125 MMHG | TEMPERATURE: 98 F | HEART RATE: 72 BPM | OXYGEN SATURATION: 97 % | DIASTOLIC BLOOD PRESSURE: 61 MMHG | RESPIRATION RATE: 18 BRPM

## 2023-08-03 DIAGNOSIS — M79.604 RIGHT LEG PAIN: Primary | ICD-10-CM

## 2023-08-03 LAB — INR BLDC: 1.9 (ref 0.9–1.1)

## 2023-08-03 PROCEDURE — 93971 EXTREMITY STUDY: CPT | Performed by: NURSE PRACTITIONER

## 2023-08-03 PROCEDURE — 85610 PROTHROMBIN TIME: CPT | Performed by: NURSE PRACTITIONER

## 2023-08-03 PROCEDURE — 99213 OFFICE O/P EST LOW 20 MIN: CPT | Performed by: NURSE PRACTITIONER

## 2023-08-03 NOTE — DISCHARGE INSTRUCTIONS
Ice to the leg. Elevate the leg. Tylenol as needed for pain. Follow-up with your doctor  regarding your INR of 1.9 and to have your leg rechecked. Return for any concerns.

## 2023-08-03 NOTE — ED INITIAL ASSESSMENT (HPI)
Pt in [address] [place] [address] for c/o R leg pain/swelling/bruising for a while but worse over the past 2-3 days. States he is concerned for blood clot due to recent hx bilateral hip replacement. States he is on warfarin daily.

## 2023-08-07 ENCOUNTER — TELEPHONE (OUTPATIENT)
Dept: PHYSICAL MEDICINE AND REHAB | Facility: CLINIC | Age: 78
End: 2023-08-07

## 2023-08-07 NOTE — TELEPHONE ENCOUNTER
Right Leg issue. Pt thinks it might be his sciatic nerve issue. Its painful to walk on. Pt is looking for some guidance from clinical team on what he should do.  Pt does not want to wait till next available with provider which is in november

## 2023-08-08 NOTE — TELEPHONE ENCOUNTER
Spoke with Dr Jose Cabrera. Per Dr Jose Cabrera pt needs a follow up. Called pt and scheduled an appointment on 08/10/2023 at 10:00. No further actions needed at this time.

## 2023-08-08 NOTE — TELEPHONE ENCOUNTER
New symptoms: sciatic pain per pt on the Right leg, on the back of the thigh down to the knee  Location of symptoms: right leg pain, upper posterior thigh going down to posterior knee. Date symptoms Began: a couple of weeks ago. Pt stated he went to therapy and started then. Current treatment: Tyelnol (not helpful)         Seen in ER/Urgent care: No    Numeric Rating Scale:  Pain at Present:  0                                                                                                            (No Pain) 0  to  10 (Worst Pain)                 Minimum Pain:   0 when sitting  Maximum Pain  8      Description of Pain: intermittent. aching \"sciatic pain\" per pt. Aggravating Factors:    Walking, early morning, causing balance issues. LOV: 5/16/2023 David Sanders MD    NOV: Visit date not found     Summary of patient request: asking for recommendations for medication or other from Dr Dominik Rivera.

## 2023-08-10 ENCOUNTER — OFFICE VISIT (OUTPATIENT)
Dept: PHYSICAL MEDICINE AND REHAB | Facility: CLINIC | Age: 78
End: 2023-08-10
Payer: MEDICARE

## 2023-08-10 VITALS — SYSTOLIC BLOOD PRESSURE: 128 MMHG | DIASTOLIC BLOOD PRESSURE: 74 MMHG

## 2023-08-10 DIAGNOSIS — M43.10 RETROLISTHESIS: ICD-10-CM

## 2023-08-10 DIAGNOSIS — G89.29 CHRONIC PAIN OF RIGHT KNEE: ICD-10-CM

## 2023-08-10 DIAGNOSIS — M25.561 CHRONIC PAIN OF RIGHT KNEE: ICD-10-CM

## 2023-08-10 DIAGNOSIS — M54.16 LUMBAR RADICULOPATHY: ICD-10-CM

## 2023-08-10 DIAGNOSIS — M48.061 LUMBAR FORAMINAL STENOSIS: ICD-10-CM

## 2023-08-10 DIAGNOSIS — D68.9 COAGULOPATHY (HCC): ICD-10-CM

## 2023-08-10 DIAGNOSIS — M35.00 SJOGREN'S SYNDROME, WITH UNSPECIFIED ORGAN INVOLVEMENT (HCC): ICD-10-CM

## 2023-08-10 DIAGNOSIS — M48.061 SPINAL STENOSIS OF LUMBAR REGION WITHOUT NEUROGENIC CLAUDICATION: ICD-10-CM

## 2023-08-10 DIAGNOSIS — M51.26 LUMBAR HERNIATED DISC: Primary | ICD-10-CM

## 2023-08-10 DIAGNOSIS — C61 MALIGNANT NEOPLASM OF PROSTATE (HCC): ICD-10-CM

## 2023-08-10 DIAGNOSIS — M51.9 LUMBAR DISC DISEASE: ICD-10-CM

## 2023-08-10 PROBLEM — M16.11 PRIMARY OSTEOARTHRITIS OF RIGHT HIP: Status: RESOLVED | Noted: 2021-12-08 | Resolved: 2023-08-10

## 2023-08-10 PROBLEM — M16.11 OSTEOARTHRITIS OF RIGHT HIP, UNSPECIFIED OSTEOARTHRITIS TYPE: Status: RESOLVED | Noted: 2022-04-01 | Resolved: 2023-08-10

## 2023-08-10 PROCEDURE — 99214 OFFICE O/P EST MOD 30 MIN: CPT | Performed by: PHYSICAL MEDICINE & REHABILITATION

## 2023-08-10 RX ORDER — GABAPENTIN 100 MG/1
100 CAPSULE ORAL 3 TIMES DAILY
Qty: 90 CAPSULE | Refills: 5 | Status: SHIPPED | OUTPATIENT
Start: 2023-08-10 | End: 2024-08-09

## 2023-08-10 RX ORDER — GABAPENTIN 100 MG/1
100 CAPSULE ORAL 3 TIMES DAILY
Qty: 90 CAPSULE | Refills: 5 | Status: SHIPPED | OUTPATIENT
Start: 2023-08-10 | End: 2023-08-10 | Stop reason: CLARIF

## 2023-08-10 NOTE — PROGRESS NOTES
Low Back Pain H & P    Chief Complaint: Patient presents with: Follow - Up: LOV: 5/10/2022 right hip injection done under ultrasound guidance. Had a hip surgery last year and worked. Now he states that he has pain from the ankle to the butt and is worried that it could be sciatica. This happened when he stepped wrong about three weeks ago and is having trouble sleeping. Sitting makes pain worse but activity makes pain a 8/10. Nursing note reviewed and verified. Patient was last seen on 5/10/2022. In June of 2022, he had the right hip replaced which resolved the pain. He had his left hip replaced in July and it is doing well. He is doing the PT for the left hip at this time. Then about 3-4 weeks ago, he stepped funny when walking off of a curb without the cane or walker with his right foot and heard a crack in the right leg and developed right lateral lower leg and thigh pain going into the right buttock. The pain starts in the right lateral ankle. He went to Urgent Care and he had US doppler of the right leg that was negative for a DVT. He went back to PT for the left hip but he treated the right leg which sometimes will increase the pain. Description of the Pain  The pain is located in the central low back. He has this every morning. The pain radiates to the right buttock, right lateral hip, right lateral thigh, and right lateral lower leg. .  The pain at its best is 2/10. The pain at its worst is 8/10. The pain is currently  0/10. The pain is described as a(n) throbbing sensation. The pain is worse when bending, standing, walking, going from sitting to standing, and putting weight on the right leg and turning over in the bed . The pain is better sitting. There is no numbness. There is no tingling in the legs. There is weakness in the right leg due to the pain. No bowel or bladder changes.      Past Medical History   Past Medical History:   Diagnosis Date    A-fib ([address] Arrhythmia     AFIB    B12 deficiency     Back problem     BPH (benign prostatic hyperplasia)     Cholecystitis 2009    Colon polyp     Depression     Diabetes ([facility]     Esophageal reflux     Essential hypertension     Exposure to medical diagnostic radiation     Hepatic steatosis     High blood pressure     High cholesterol     Hyperlipidemia     Osteoarthritis     Personal history of antineoplastic chemotherapy     Prostate cancer ([facility] 2005    Rotator cuff disorder     Sleep apnea     using CPAP at night    Visual impairment     Wears eyeglasses       Past Surgical History   Past Surgical History:   Procedure Laterality Date    ABLATION      cardiac ablation    CHOLECYSTECTOMY  2009    COLONOSCOPY      COLONOSCOPY N/A 2016    Procedure: COLONOSCOPY;  Surgeon: Lester Portillo MD;  Location: [address] [place] [address] ENDOSCOPY    COLONOSCOPY N/A 2022    Procedure: COLONOSCOPY;  Surgeon: Nato Gan MD;  Location: [address] [place] [address] ENDOSCOPY    KNEE REPLACEMENT SURGERY      LAPAROSCOPY, SURGICAL PROSTATECTOMY, RETROPUBIC RADICAL, W/NERVE SPARING      OTHER SURGICAL HISTORY      rotator cuff       Family History   Family History   Problem Relation Age of Onset    Breast Cancer Mother     Heart Disease Mother         CAD    Heart Disease Father         No h/o heart disease       Social History   Social History    Socioeconomic History      Marital status:        Spouse name: Not on file      Number of children: Not on file      Years of education: Not on file      Highest education level: Not on file    Occupational History      Not on file    Tobacco Use      Smoking status: Former        Packs/day: 1.00        Years: 25.00        Pack years: 25        Types: Cigarettes        Quit date: 1978        Years since quittin.6      Smokeless tobacco: Never    Vaping Use      Vaping Use: Never used    Substance and Sexual Activity      Alcohol use: No        Alcohol/week: 0.0 standard drinks of alcohol Comment: very rare      Drug use: No      Sexual activity: Not on file    Other Topics      Concerns:         Service: Not Asked        Blood Transfusions: Not Asked        Caffeine Concern: No        Occupational Exposure: Not Asked        Hobby Hazards: Not Asked        Sleep Concern: No        Stress Concern: Not Asked        Weight Concern: Not Asked        Special Diet: Not Asked        Back Care: Not Asked        Exercise: No        Bike Helmet: Not Asked        Seat Belt: Not Asked        Self-Exams: Not Asked    Social History Narrative      The patient does not use an assistive device. .        The patient does not live in a home with stairs. Social Determinants of Health  Financial Resource Strain: Not on file  Food Insecurity: Not on file  Transportation Needs: Not on file  Physical Activity: Not on file  Stress: Not on file  Social Connections: Not on file  Housing Stability: Not on file    PE:  The patient does appear in his stated age in no distress. The patient is well groomed. Psychiatric:  The patient is alert and oriented x 3. The patient has a normal affect and mood. Respiratory:  No acute respiratory distress. Patient does not have a cough. HEENT:  Extraocular muscles are intact. There is no kern icterus. Pupils are equal, round, and reactive to light. No redness or discharge bilaterally. Skin:  There are no rashes or lesions.     Vitals:   08/10/23  1001   BP: 128/74       Gait:    Gait: right antalgic gait   Sit to Stand: mild-moderate difficulty      Lumbar Spine:    Scoliosis: No scoliosis present     Lumbar Spine Palpation:    Spinous Processes: Non-tender for all Spinous Processes   Z-joints: Non-tender for all Z-joints   SIJ: Non-tender for bilateral SIJ   Piriformis Muscle: Tender at right Piriformis muscle(s)   Greater Trochanteric Bursa: Tender at right Greater Trochanteric Bursa(s)     Vascular lower extremity:   Dorsalis pedis pulse-RIGHT 2+   Dorsalis pedis pulse-LEFT 2+   Tibialis posterior pulse-RIGHT 2+   Tibialis posterior pulse-LEFT 2+     Neurological Lower Extremity:    Light Touch Sensation: Intact in bilateral Lower Extremities   LE Muscle Strength: All LE strength measurements 5/5 except:  Gluteus medius RIGHT:   4+/5  Hamstring RIGHT:   4+/5   RIGHT plantar reflexes: downward response   LEFT plantar reflexes: downward response   Reflexes: 2+ in bilateral lower extremities except:  Left patellar tendon which are absent     Neural Tension Tests Lumbar Spine:  Supine straight leg raise-RIGHT Positive for right anterior thigh and lateral hip pain and at 30 degrees   Supine straight leg raise-LEFT Negative for pain     Assessment  1. L5-S1 slight-mild central annular tear, L4-5 right mild-mod far lateral & mild central HNP's, L3-4 right mild-mod paracentral HNP, L2-3 small central HNP    2. L5-S1 left mod, L4-5 bilateral mod, L3-4 bilateral mod, L2-3 left mod foraminal stenosis    3. L4-5 mod, L3-4 mod-severe, L2-3 mod central stenosis    4. L5-S1 mild diffuse, L4-5 mild-mod diffuse, L3-4 mod diffuse, L2-3 mod-large diffuse, L1-2 right mod foraminal bulging dsics    5. right > left L4 radiculopathy    6. L2-3 & L3-4 grade 1 retrolisthesis    7. Chronic pain of right knee with normal x-ray    8. Malignant neoplasm of prostate ([facility]    9. Coagulopathy ([facility]    10. Sjogren's syndrome, with unspecified organ involvement [facility])         Plan  He will get a MRI of the lumbar spine to look for new pathology that would the cause of his current symptoms especially given history of prostate cancer. He will try gabapentin 100 mg 3 times a day for now for the pain. He will continue with the PT for the left hip for now. He will follow up after having the MRI so that I can determine if he needs a lumbar CONNOR versus PT or both. The patient understands and agrees with the stated plan. Elizabeth Manning MD  8/10/2023

## 2023-08-10 NOTE — PATIENT INSTRUCTIONS
Plan  He will get a MRI of the lumbar spine to look for new pathology that would the cause of his current symptoms especially given history of prostate cancer. He will try gabapentin 100 mg 3 times a day for now for the pain. He will continue with the PT for the left hip for now. He will follow up after having the MRI so that I can determine if he needs a lumbar CONNOR versus PT or both.

## 2023-08-12 ENCOUNTER — HOSPITAL ENCOUNTER (OUTPATIENT)
Dept: MRI IMAGING | Facility: HOSPITAL | Age: 78
Discharge: HOME | End: 2023-08-12
Attending: PHYSICAL MEDICINE & REHABILITATION
Payer: MEDICARE

## 2023-08-12 DIAGNOSIS — M54.16 LUMBAR RADICULOPATHY: ICD-10-CM

## 2023-08-12 PROCEDURE — 72148 MRI LUMBAR SPINE W/O DYE: CPT | Performed by: PHYSICAL MEDICINE & REHABILITATION

## 2023-08-14 ENCOUNTER — TELEPHONE (OUTPATIENT)
Dept: PHYSICAL MEDICINE AND REHAB | Facility: CLINIC | Age: 78
End: 2023-08-14

## 2023-08-14 DIAGNOSIS — M71.38 SYNOVIAL CYST OF LUMBAR SPINE: Primary | ICD-10-CM

## 2023-08-14 DIAGNOSIS — M54.16 LUMBAR RADICULOPATHY: ICD-10-CM

## 2023-08-14 NOTE — TELEPHONE ENCOUNTER
Patient completed lumbar spine MRI on 8/12/23. Per MRI report:  \"CONCLUSION:   1. L5-S1 :  New large right right-sided facet synovial cyst resulting in severe right lateral narrowing with S1 impingement. Mild to moderate left greater than right lateral narrowing. 2. Multilevel disc and facet degeneration which is otherwise without significant change-most significant findings as follows:   3. L3-4:  Moderate central narrowing. Mild to moderate left-sided peripheral narrowing. 4. L4-5:  Moderate to severe central narrowing. Moderate right and mild-to-moderate left foraminal narrowing. 5. L2-3:  Moderate central narrowing. Mild to moderate right-sided peripheral narrowing \"      Plan per Michaela Oliva on 8/10/23: \"He will follow up after having the MRI so that I can determine if he needs a lumbar CONNOR versus PT or both. \"    Message forwarded on to Michaela Oliva for review/recommendations.

## 2023-08-14 NOTE — TELEPHONE ENCOUNTER
Pt called to make mri f/u appt, currently scheduled on 9/12. Pt states that he was told he could get in right away by Dr Mark See. please call back and advise.

## 2023-08-14 NOTE — TELEPHONE ENCOUNTER
Per : \"lumbar synovial cyst aspiration and right L5 and S1 TFESI's \"    Spoke with patient and reviewed above recommendations. Patient understood and also asked for a [employer] message be sent to him as well with this information. Will call patient back once insurance approval is received. Injection order placed. Message was also sent to patient via [employer]. Nothing further needed at this time.

## 2023-08-15 ENCOUNTER — TELEPHONE (OUTPATIENT)
Dept: PHYSICAL MEDICINE AND REHAB | Facility: CLINIC | Age: 78
End: 2023-08-15

## 2023-08-15 DIAGNOSIS — Z51.81 THERAPEUTIC DRUG MONITORING: ICD-10-CM

## 2023-08-15 DIAGNOSIS — M71.38 SYNOVIAL CYST OF LUMBAR SPINE: Primary | ICD-10-CM

## 2023-08-15 DIAGNOSIS — M54.16 LUMBAR RADICULOPATHY: ICD-10-CM

## 2023-08-15 DIAGNOSIS — Z79.01 CURRENT USE OF LONG TERM ANTICOAGULATION: ICD-10-CM

## 2023-08-15 NOTE — TELEPHONE ENCOUNTER
Per CMS Guidelines -no authorization is required for Lumbar synovial cyst aspiration and right L5 and S1 TFESI's CPT 43273, [id number], 22302     Status: Authorization is not required however may be subject to review once claim is submitted-Covered Benefit

## 2023-08-15 NOTE — TELEPHONE ENCOUNTER
Spoke with Pt. Per pt he is taking Warfarin 5 mg as well as Aspirin 81 mg by Dr Tameka Hicks. Called Dr Daniel Hughes office [phone]. Faxed the anticoag clearance form at   fax# [phone].

## 2023-08-16 NOTE — TELEPHONE ENCOUNTER
Received Avril Garcia from Dr Semaj Dan office Patient may hold Warfarin 5 days as long as no hx of CVA (Prefer stay on ASA if possible. Letter placed in 's folder by the nurse kurtis for review.

## 2023-08-18 ENCOUNTER — MED REC SCAN ONLY (OUTPATIENT)
Dept: PHYSICAL MEDICINE AND REHAB | Facility: CLINIC | Age: 78
End: 2023-08-18

## 2023-08-18 NOTE — TELEPHONE ENCOUNTER
Clearance received to hold Warfarin for 5 days but Dr. Giovanni Ritchie stated he would prefer the patient stay on ASA. Need to speak with Dr. Codey Grier about only holding Warfarin if possible. Message sent and will await response. Patient made aware and will await for phone call from clinic before proceeding.

## 2023-08-18 NOTE — TELEPHONE ENCOUNTER
Patient has been scheduled for Lumbar synovial cyst aspiration and right L5 and S1 Transforaminal epidural steroid injection on 08/25/2023 at the [address] [place] [address] with Dr. Elenita Puga. -Anesthesia type: LOCAL. -If scheduling [facility] covid testing required for all procedures whether patient is vaccinated or not. -Patient informed not to eat or drink anything after midnight the night prior to the procedure, if being sedated. (For afternoon injections: Patient to fast 6-8 hours prior to procedure with IVCS/MAC. )  -Patient was advised that if he/she does receive the covid vaccine it needs to be at least 2 weeks before or after the injection. -Medications and allergies reviewed. -Patient reminded to hold NSAIDs (Ibuprofen, ASA 81, Aleve, Naproxen, Mobic, Diclofenac, Etodolac, Celebrex etc.) for 3 days prior to [place]  if BMI is greater than 35. For Cervical injections only hold multivitamins, Vitamin E, Fish Oil, Phentermine (Lomaira) for 7 days prior to injection and NSAIDS.  mg to be held for 7 days prior to injections.  -If patient is receiving MAC/IVCS Phentermine Pablo Gaxiola), Adlyxin (Lixisenatide), Bydureon BCise (Exenatide-release), Byetta (Exenatide), Mounjaro (Tirezepatide), Ozempic (Semaglutide), Rybelsus (oral demaglutide), Saxenda (Liraglutide), Trulicity (Dulaglutide), Victoriza (Liraglutide), Wegovy (Semaglutide), Berberine (oral natures ozempic) will need to be held for 7 days prior to injection.  -If on blood thinner clearance has been received to hold this medication by provider. Clearance letter   -Patient informed he/she will need a  to and from procedure. -[facility] is located in the [facility] 1st floor. Patient may park in the yellow or purple parking.     Follow up appointment has been scheduled for patient on: 09/12/2023    Patient verbalized understanding and agrees with plan.  -----> Scheduled in Epic: Yes  -----> Scheduled in [employer]/Surgical Case Request: Yes

## 2023-08-21 NOTE — TELEPHONE ENCOUNTER
Patient holding coumadin for upcoming procedure. Patient will need to complete PT/INR morning of procedure. LM returning [facility]'s phone call and relayed the above information. PT/INR order placed.

## 2023-08-21 NOTE — TELEPHONE ENCOUNTER
Anya from [facility] called asking to speak with clinical team in regards to pts upcoming injection this Friday. She was inquiring about if pt needed to get bloodwork done prior to his injection.  Please advise

## 2023-08-25 ENCOUNTER — LAB ENCOUNTER (OUTPATIENT)
Dept: LAB | Facility: HOSPITAL | Age: 78
End: 2023-08-25
Attending: PHYSICAL MEDICINE & REHABILITATION
Payer: MEDICARE

## 2023-08-25 DIAGNOSIS — M71.38 SYNOVIAL CYST OF LUMBAR SPINE: ICD-10-CM

## 2023-08-25 DIAGNOSIS — Z79.01 CURRENT USE OF LONG TERM ANTICOAGULATION: ICD-10-CM

## 2023-08-25 DIAGNOSIS — Z51.81 THERAPEUTIC DRUG MONITORING: ICD-10-CM

## 2023-08-25 DIAGNOSIS — M54.16 LUMBAR RADICULOPATHY: ICD-10-CM

## 2023-08-25 PROBLEM — M47.816 LUMBAR FACET ARTHROPATHY: Status: ACTIVE | Noted: 2023-08-25

## 2023-08-25 PROBLEM — M43.16 SPONDYLOLISTHESIS OF LUMBAR REGION: Status: ACTIVE | Noted: 2023-08-25

## 2023-08-25 LAB
INR BLD: 1.16 (ref 0.85–1.16)
PROTHROMBIN TIME: 14.7 SECONDS (ref 11.6–14.8)

## 2023-08-25 PROCEDURE — 85610 PROTHROMBIN TIME: CPT

## 2023-08-25 PROCEDURE — 36415 COLL VENOUS BLD VENIPUNCTURE: CPT

## 2023-09-12 ENCOUNTER — OFFICE VISIT (OUTPATIENT)
Dept: PHYSICAL MEDICINE AND REHAB | Facility: CLINIC | Age: 78
End: 2023-09-12
Payer: MEDICARE

## 2023-09-12 DIAGNOSIS — M51.26 LUMBAR HERNIATED DISC: ICD-10-CM

## 2023-09-12 DIAGNOSIS — M43.16 SPONDYLOLISTHESIS OF LUMBAR REGION: ICD-10-CM

## 2023-09-12 DIAGNOSIS — M25.552 LEFT HIP PAIN: Primary | ICD-10-CM

## 2023-09-12 DIAGNOSIS — M48.061 SPINAL STENOSIS OF LUMBAR REGION WITHOUT NEUROGENIC CLAUDICATION: ICD-10-CM

## 2023-09-12 DIAGNOSIS — M51.9 LUMBAR DISC DISEASE: ICD-10-CM

## 2023-09-12 DIAGNOSIS — M48.061 LUMBAR FORAMINAL STENOSIS: ICD-10-CM

## 2023-09-12 DIAGNOSIS — M71.38 SYNOVIAL CYST OF LUMBAR FACET JOINT: ICD-10-CM

## 2023-09-12 DIAGNOSIS — M43.10 RETROLISTHESIS: ICD-10-CM

## 2023-09-12 PROCEDURE — 99213 OFFICE O/P EST LOW 20 MIN: CPT | Performed by: PHYSICAL MEDICINE & REHABILITATION

## 2023-10-04 ENCOUNTER — TELEPHONE (OUTPATIENT)
Dept: PHYSICAL MEDICINE AND REHAB | Facility: CLINIC | Age: 78
End: 2023-10-04

## 2023-10-04 NOTE — TELEPHONE ENCOUNTER
Location of Pain: LEFT leg posterior (\"From the ankle all the way up to the behind\")  Old or new pain: OLD  Date Pain Began: Chronic - \"has been going on for awhile\"              Numeric Rating Scale:  Pain at Present:  0 (patient currently sitting in the recliner)                                                                                                 (No Pain) 0  to  10 (Worst Pain)                 Minimum Pain:   0 - when sitting  Maximum Pain  8-10     Distribution of Pain:    left  Quality of Pain:   sharp/stabbing; pt reports some weakness to LLE, \"sometimes I get up from bed and can't stand on it. \"  Aggravating Factors: Other \"when I put pressure on my foot\"  Current pain treatment:  1/2 Norco 5-325MG nightly PRN    LOV:9/12/2023 Chris Rojas MD   NOV: 11/20/2023 Estuardo Goldman MD     Summary of patient request: \"Well he did the right side, right? Would it be any different to do the left side? \" Patient requesting a LEFT sided MRI to further determine next steps. Patient had Lumbar synovial cyst aspiration and right L5 and S1 transforaminal epidural steroid injection on 08/25/23 with great relief to RLE pain. Patient had OV with Dr. Radha Kovacs today where XR LEFT HIP & LEFT KNEE were performed with normal results per patient report. Phone call was disconnected before all information could be collected.

## 2023-10-05 NOTE — TELEPHONE ENCOUNTER
Per Dr. Tahmina Miller: sooner follow up appointment required.      Received verbal clearance from Dr. Tahmina Miller to schedule on 10/18/23 at 10:30AM.

## 2023-10-10 ENCOUNTER — OFFICE VISIT (OUTPATIENT)
Dept: PHYSICAL MEDICINE AND REHAB | Facility: CLINIC | Age: 78
End: 2023-10-10
Payer: MEDICARE

## 2023-10-10 ENCOUNTER — TELEPHONE (OUTPATIENT)
Dept: PHYSICAL MEDICINE AND REHAB | Facility: CLINIC | Age: 78
End: 2023-10-10

## 2023-10-10 DIAGNOSIS — M25.551 RIGHT HIP PAIN: ICD-10-CM

## 2023-10-10 DIAGNOSIS — E11.9 DIABETES MELLITUS, STABLE (HCC): ICD-10-CM

## 2023-10-10 DIAGNOSIS — Z79.01 ANTICOAGULANT LONG-TERM USE: ICD-10-CM

## 2023-10-10 DIAGNOSIS — M47.816 LUMBAR FACET ARTHROPATHY: ICD-10-CM

## 2023-10-10 DIAGNOSIS — M43.10 RETROLISTHESIS: ICD-10-CM

## 2023-10-10 DIAGNOSIS — Z79.01 WARFARIN ANTICOAGULATION: ICD-10-CM

## 2023-10-10 DIAGNOSIS — M25.552 LEFT HIP PAIN: ICD-10-CM

## 2023-10-10 DIAGNOSIS — M43.16 SPONDYLOLISTHESIS OF LUMBAR REGION: ICD-10-CM

## 2023-10-10 DIAGNOSIS — M35.00 SJOGREN'S SYNDROME, WITH UNSPECIFIED ORGAN INVOLVEMENT (HCC): ICD-10-CM

## 2023-10-10 DIAGNOSIS — M71.38 SYNOVIAL CYST OF LUMBAR FACET JOINT: ICD-10-CM

## 2023-10-10 DIAGNOSIS — M51.9 LUMBAR DISC DISEASE: ICD-10-CM

## 2023-10-10 DIAGNOSIS — M54.16 LUMBAR RADICULOPATHY: Primary | ICD-10-CM

## 2023-10-10 DIAGNOSIS — C61 MALIGNANT NEOPLASM OF PROSTATE (HCC): ICD-10-CM

## 2023-10-10 DIAGNOSIS — M48.061 SPINAL STENOSIS OF LUMBAR REGION WITHOUT NEUROGENIC CLAUDICATION: Primary | ICD-10-CM

## 2023-10-10 DIAGNOSIS — M54.16 LUMBAR RADICULOPATHY: ICD-10-CM

## 2023-10-10 DIAGNOSIS — M51.26 LUMBAR HERNIATED DISC: ICD-10-CM

## 2023-10-10 DIAGNOSIS — E53.8 B12 DEFICIENCY: ICD-10-CM

## 2023-10-10 DIAGNOSIS — M48.061 LUMBAR FORAMINAL STENOSIS: ICD-10-CM

## 2023-10-10 PROCEDURE — 99214 OFFICE O/P EST MOD 30 MIN: CPT | Performed by: PHYSICAL MEDICINE & REHABILITATION

## 2023-10-10 NOTE — TELEPHONE ENCOUNTER
Per CMS Guidelines No Auth required for Left L4-5 Lumbar Transforaminal Epidural Injection under fluoroscopy.  CPT Code 12369   Status: Auth not required

## 2023-10-10 NOTE — PATIENT INSTRUCTIONS
Plan  I will perform left L4 TFESI(s). He will get into the PT as scheduled. He will continue with the gabapentin for now. The patient will follow up in 3 months, but the patient will call me 2 weeks after having the injection to let me know how the injection worked.

## 2023-10-10 NOTE — PROGRESS NOTES
Low Back Pain H & P    Chief Complaint: Patient presents with:  Leg Pain: LOV 9/12/2023 Patient here to follow up on L leg pain, received R leg MRI and is requesting one for his L leg now as the pain seems to be the same as what he had on the R leg. Had xray of L knee and hip from 10/4 done at [facility]. [facility] 7/10    Nursing note reviewed and verified. Patient was last seen on 9/12/2023. He states the right leg is still doing better. He has some pain on the right side in the buttock, but it is not significant. He has the PT scheduled in early November with Stewart Taylor. The kenan is about the same now. He is having difficulty standing on the left leg. Description of the Pain  The pain is located in the bilateral low back. The pain radiates to the left anterior thigh. This is not related to the low back pain. The pain at its best is 0/10. The pain at its worst is 7/10. The pain is currently  2/10. The leg pain is described as a(n) sharp sensation. The low back pain is an aching pain. The pain is worse when bending, standing, walking, and going from sitting to standing. The pain is better sitting. There is no numbness. There is no tingling in the legs. There is weakness in the left leg.      Past Medical History   Past Medical History:   Diagnosis Date    A-fib [facility])     Anxiety     Arrhythmia     AFIB    B12 deficiency     Back problem     BPH (benign prostatic hyperplasia)     Cholecystitis 2009    Chronic atrial fibrillation ([address] 5 years ago    Colon polyp 2016    Depression     Diabetes ([facility])     Esophageal reflux     Essential hypertension     Exposure to medical diagnostic radiation     Hepatic steatosis     High blood pressure     High cholesterol     Hyperlipidemia     Osteoarthritis     Personal history of antineoplastic chemotherapy     Prostate cancer ([address] 2005    Rotator cuff disorder 2001    Sleep apnea     using CPAP at night    Visual impairment     Wears eyeglasses       Past Surgical History   Past Surgical History:   Procedure Laterality Date    ABLATION  2011    cardiac ablation    CHOLECYSTECTOMY  2009    COLONOSCOPY  2011    COLONOSCOPY N/A 2016    Procedure: COLONOSCOPY;  Surgeon: Jessica Sylvester MD;  Location: [address] [place] [address] ENDOSCOPY    COLONOSCOPY N/A 2022    Procedure: COLONOSCOPY;  Surgeon: Nato Mark MD;  Location: [address] [place] [address], SURGICAL PROSTATECTOMY, RETROPUBIC RADICAL, W/NERVE SPARING      OTHER SURGICAL HISTORY  2001    rotator cuff    OTHER SURGICAL HISTORY N/A     Uvulopalatopharyngoplasty    PROSTATE SURGERY N/A     PROSTATE REMOVAL    RADIATION N/A        Family History   Family History   Problem Relation Age of Onset    Breast Cancer Mother     Heart Disease Mother         CAD    Heart Disease Father         No h/o heart disease       Social History   Social History    Socioeconomic History      Marital status:        Spouse name: Not on file      Number of children: Not on file      Years of education: Not on file      Highest education level: Not on file    Occupational History      Not on file    Tobacco Use      Smoking status: Former        Packs/day: 1.00        Years: 25.00        Additional pack years: 0.00        Total pack years: 25.00        Types: Cigarettes        Quit date: 1978        Years since quittin.8      Smokeless tobacco: Never    Vaping Use      Vaping Use: Never used    Substance and Sexual Activity      Alcohol use: No        Comment: very rare      Drug use: No      Sexual activity: Not on file    Other Topics      Concerns:         Service: Not Asked        Blood Transfusions: Not Asked        Caffeine Concern: No        Occupational Exposure: Not Asked        Hobby Hazards: Not Asked        Sleep Concern: No        Stress Concern: Not Asked        Weight Concern: Not Asked        Special Diet: Not Asked        Back Care: Not Asked Exercise: No        Bike Helmet: Not Asked        Seat Belt: Not Asked        Self-Exams: Not Asked    Social History Narrative      The patient does not use an assistive device. .        The patient does not live in a home with stairs. Social Determinants of Health  Financial Resource Strain: Not on file  Food Insecurity: Not on file  Transportation Needs: Not on file  Physical Activity: Not on file  Stress: Not on file  Social Connections: Not on file  Housing Stability: Not on file    PE:  The patient does appear in his stated age in no distress. The patient is well groomed. Psychiatric:  The patient is alert and oriented x 3. The patient has a normal affect and mood. Respiratory:  No acute respiratory distress. Patient does not have a cough. HEENT:  Extraocular muscles are intact. There is no kern icterus. Pupils are equal, round, and reactive to light. No redness or discharge bilaterally. Skin:  There are no rashes or lesions. Vitals: There were no vitals filed for this visit. Gait:    Gait: Wide Based   Sit to Stand: mild difficulty      Lumbar Spine:    Scoliosis: No scoliosis present, but stands with mild flexed posture     Lumbar Spine Palpation:    Spinous Processes: Non-tender for all Spinous Processes   Z-joints: Non-tender for all Z-joints   SIJ: Non-tender for bilateral SIJ   Piriformis Muscle: Non-tender bilateral Piriformis muscles   Greater Trochanteric Bursa: Non-tender for bilateral Greater Trochanteric Bursa     Vascular lower extremity:   Dorsalis pedis pulse-RIGHT 2+   Dorsalis pedis pulse-LEFT 2+   Tibialis posterior pulse-RIGHT 2+   Tibialis posterior pulse-LEFT 2+     Neurological Lower Extremity:    Light Touch Sensation: Intact in bilateral Lower Extremities   LE Muscle Strength:  All LE strength measurements 5/5 except:  Gluteus medius LEFT:   4+/5  Hamstring LEFT:   4-/5  Hip Flexion LEFT:   4+/5  Knee Extension LEFT:   4+/5  Ankle Dorsiflexlon LEFT:   4+/5 RIGHT plantar reflexes: downward response   LEFT plantar reflexes: downward response   Reflexes: 2+ in bilateral lower extremities     Assessment  1. L4-5 mod, L3-4 mod-severe, L2-3 mod central stenosis    2. L5-S1 left mod/right mild-mod, L4-5 bilateral mod, L3-4 bilateral mod, L2-3 left mod foraminal stenosis    3. L5-S1 mild diffuse, L4-5 mild-mod diffuse, L3-4 mod diffuse, L2-3 mod-large diffuse, L1-2 right mod foraminal bulging dsics    4. L5-S1 right mild paracentral, L4-5 mild-mod central & right mod far lateral, L3-4 right mild-mod paracentral, L2-3 mild central, L1-2 right mod foraminal HNP's    5. Lumbar facet arthropathy    6. right L5-S1 radiculopathy    7. Diabetes mellitus, stable ([address]    8. B12 deficiency    9. L2-3 & L3-4 grade 1 retrolisthesis    10. L4-5 grade 1 spondylolisthesis    11. Left hip pain & s/p left YESY in 7/2023    12. Right hip pain & s/p right YESY    13. Synovial cyst of lumbar facet joint: L5-S1 right large    14. Sjogren's syndrome, with unspecified organ involvement ([address]    15. Malignant neoplasm of prostate ([address]         Plan  I will perform left L4 TFESI(s). He will get into the PT as scheduled. He will continue with the gabapentin for now. The patient will follow up in 3 months, but the patient will call me 2 weeks after having the injection to let me know how the injection worked. The patient understands and agrees with the stated plan. Jimmycher Miller MD  10/10/2023

## 2023-10-12 NOTE — TELEPHONE ENCOUNTER
Patient takes Warfarin Sodium (5 mg) daily  Dr. Siria Wheat:  Nayeli King.   Phone [phone]      Antioag letter faxed (through [address] [place] [address] Rd) to [facility] Fax #: [phone]     Status of Anticoag  [x] Clearance pending   [] Clearance approved  [] Clearance denied

## 2023-10-13 NOTE — TELEPHONE ENCOUNTER
Clearance form for anticoagulation placed for scanning. Pt was advised to hold the blood thinners for 5 days prior to the injection as advised by MILI Garrett. Pt verbalized understanding. INR order per protocol prior to the procedure signed. Patient has been scheduled for  Left L4-5 Lumbar Transforaminal Epidural Injection under fluoroscopy  on 10/20/23 at the [address] with Neftaly Cope. -Anesthesia type: local.  -If scheduling [facility] covid testing required for all procedures whether patient is vaccinated or not. -Patient informed not to eat or drink anything after midnight the night prior to the procedure, if being sedated. (For afternoon injections: Patient to fast 6-8 hours prior to procedure with IVCS/MAC. )  -Patient was advised that if he/she does receive the covid vaccine it needs to be at least 2 weeks before or after the injection. -Medications and allergies reviewed. -Patient reminded to hold NSAIDs (Ibuprofen, ASA 81, Aleve, Naproxen, Mobic, Diclofenac, Etodolac, Celebrex etc.) for 3 days prior to [facility]  if BMI is greater than 35. For Cervical injections only hold multivitamins, Vitamin E, Fish Oil, Phentermine (Lomaira) for 7 days prior to injection and NSAIDS.  mg to be held for 7 days prior to injections.  -If patient is receiving MAC/IVCS Phentermine Vermell Manzanilla), Adlyxin (Lixisenatide), Bydureon BCise (Exenatide-release), Byetta (Exenatide), Mounjaro (Tirezepatide), Ozempic (Semaglutide), Rybelsus (oral demaglutide), Saxenda (Liraglutide), Trulicity (Dulaglutide), Victoriza (Liraglutide), Wegovy (Semaglutide), Berberine (oral natures ozempic) will need to be held for 7 days prior to injection.  -If patient's BMI is greater than 50. Patient is unable to get IVCS/MAC at [address] (Options: Patient can go to [facility] under MAC or ENDO under IVCS-reminder physician's slots at ENDO are limited.  OR Patient can have the procedure done under local anesthesia at [facility] with Valium ( per physician's preference.)    -If on blood thinner clearance has been received to hold this medication by provider.   -Patient informed he/she will need a  to and from procedure. -[facility] is located in the [facility] 1st floor. Patient may park in the yellow or purple parking.     Patient verbalized understanding and agrees with plan.  -----> Scheduled in Epic: Yes  -----> Scheduled in [employer]/Surgical Case Request: Yes

## 2023-10-16 NOTE — TELEPHONE ENCOUNTER
Patient called to verify medications to stop & ok to take prior to injection this Friday. Patient reported he stopped taking his Warfarin yesterday (on 10/15/2023) and went through his remaining medications one by one. RN answered all patients questions on which to continue to taking (all ok besides Warfarin x 5 days and baby asa x 3 days)     Patient asked if Dr. Francis Bates does anything with the nose. RN asked for clarification, and patient stated he has a nasal polyp. RN advised patient needs to see an ENT and provided patient with ENT phone for [facility] group at [facility]. Patient was grateful for the information. RN phoned patient back to be sure he knew to come in earlier to get his PT/INR . Patient advised he did know but thanked RN.

## 2023-10-20 ENCOUNTER — LAB ENCOUNTER (OUTPATIENT)
Dept: LAB | Facility: HOSPITAL | Age: 78
End: 2023-10-20
Attending: PHYSICAL MEDICINE & REHABILITATION

## 2023-10-20 DIAGNOSIS — Z79.01 WARFARIN ANTICOAGULATION: ICD-10-CM

## 2023-10-20 LAB
INR BLD: 1.04 (ref 0.8–1.2)
PROTHROMBIN TIME: 14.2 SECONDS (ref 11.6–14.8)

## 2023-10-20 PROCEDURE — 36415 COLL VENOUS BLD VENIPUNCTURE: CPT

## 2023-10-20 PROCEDURE — 85610 PROTHROMBIN TIME: CPT

## 2023-11-03 ENCOUNTER — OFFICE VISIT (OUTPATIENT)
Dept: PHYSICAL THERAPY | Facility: HOSPITAL | Age: 78
End: 2023-11-03
Attending: PHYSICAL MEDICINE & REHABILITATION
Payer: MEDICARE

## 2023-11-03 ENCOUNTER — TELEPHONE (OUTPATIENT)
Facility: CLINIC | Age: 78
End: 2023-11-03

## 2023-11-03 DIAGNOSIS — M51.9 LUMBAR DISC DISEASE: ICD-10-CM

## 2023-11-03 DIAGNOSIS — M43.10 RETROLISTHESIS: ICD-10-CM

## 2023-11-03 DIAGNOSIS — M43.16 SPONDYLOLISTHESIS OF LUMBAR REGION: ICD-10-CM

## 2023-11-03 DIAGNOSIS — M25.552 LEFT HIP PAIN: Primary | ICD-10-CM

## 2023-11-03 DIAGNOSIS — M48.061 LUMBAR FORAMINAL STENOSIS: ICD-10-CM

## 2023-11-03 DIAGNOSIS — M48.061 SPINAL STENOSIS OF LUMBAR REGION WITHOUT NEUROGENIC CLAUDICATION: ICD-10-CM

## 2023-11-03 DIAGNOSIS — R19.7 DIARRHEA, UNSPECIFIED TYPE: Primary | ICD-10-CM

## 2023-11-03 DIAGNOSIS — M71.38 SYNOVIAL CYST OF LUMBAR FACET JOINT: ICD-10-CM

## 2023-11-03 PROCEDURE — 97162 PT EVAL MOD COMPLEX 30 MIN: CPT | Performed by: PHYSICAL THERAPIST

## 2023-11-03 PROCEDURE — 97112 NEUROMUSCULAR REEDUCATION: CPT | Performed by: PHYSICAL THERAPIST

## 2023-11-03 PROCEDURE — 97530 THERAPEUTIC ACTIVITIES: CPT | Performed by: PHYSICAL THERAPIST

## 2023-11-03 NOTE — TELEPHONE ENCOUNTER
Patient contacted, reports has been having mushy/looser stools-denies any abdominal pain. Does not have a history of diverticulitis. Patient denies being on antibiotics recently. Denies any blood per rectum. He reports he is actually feeling better today. No travel or sick exposures. Plan-brat diet  -Stool tests  -Keep up with fluids and stay hydrated  -Sounds like he is getting better gradually, may have gotten into a gastroenteritis or something and hopefully is on the mend.

## 2023-11-03 NOTE — TELEPHONE ENCOUNTER
Dr. Becca Mendoza    I spoke to [facility]  Denies abdominal pain. Reports history of diverticulosis. Just having diarrhea, no stomach cramps either. He also is experiencing urgency. He has had 2 episodes today it isn't a large volume. Denies recent antibiotic use. Denies international travel or eating out at any questionable restaurants. He can't walk well, had recent back injection and not working. Wants to know what you would recommend. Please advise. Thank you    I advised to ensure he is taking in adequate fluids and if he has any signs of dehydration which were reviewed to go to ER.

## 2023-11-03 NOTE — TELEPHONE ENCOUNTER
Patient states that he has diverticulitis and is having diarrhea, so the patient wants to know what medication should he take? Is Dr Morenita Benito able to prescribe any medication or is there any medication that is over the counter that he can take to slow down the diarrhea?

## 2023-11-05 ENCOUNTER — LAB ENCOUNTER (OUTPATIENT)
Dept: LAB | Facility: HOSPITAL | Age: 78
End: 2023-11-05
Attending: INTERNAL MEDICINE
Payer: MEDICARE

## 2023-11-05 DIAGNOSIS — R19.7 DIARRHEA, UNSPECIFIED TYPE: ICD-10-CM

## 2023-11-05 LAB
C DIFF TOX B STL QL: NEGATIVE
CRYPTOSP AG STL QL IA: NEGATIVE
G LAMBLIA AG STL QL IA: NEGATIVE

## 2023-11-05 PROCEDURE — 87272 CRYPTOSPORIDIUM AG IF: CPT

## 2023-11-05 PROCEDURE — 87493 C DIFF AMPLIFIED PROBE: CPT

## 2023-11-05 PROCEDURE — 87046 STOOL CULTR AEROBIC BACT EA: CPT

## 2023-11-05 PROCEDURE — 87427 SHIGA-LIKE TOXIN AG IA: CPT

## 2023-11-05 PROCEDURE — 87329 GIARDIA AG IA: CPT

## 2023-11-05 PROCEDURE — 87045 FECES CULTURE AEROBIC BACT: CPT

## 2023-11-06 ENCOUNTER — OFFICE VISIT (OUTPATIENT)
Dept: PHYSICAL THERAPY | Facility: HOSPITAL | Age: 78
End: 2023-11-06
Attending: PHYSICAL MEDICINE & REHABILITATION
Payer: MEDICARE

## 2023-11-06 PROCEDURE — 97112 NEUROMUSCULAR REEDUCATION: CPT | Performed by: PHYSICAL THERAPIST

## 2023-11-06 PROCEDURE — 97530 THERAPEUTIC ACTIVITIES: CPT | Performed by: PHYSICAL THERAPIST
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
## 2023-11-13 ENCOUNTER — OFFICE VISIT (OUTPATIENT)
Dept: PHYSICAL THERAPY | Facility: HOSPITAL | Age: 78
End: 2023-11-13
Attending: PHYSICAL MEDICINE & REHABILITATION
Payer: MEDICARE

## 2023-11-13 PROCEDURE — 97112 NEUROMUSCULAR REEDUCATION: CPT | Performed by: PHYSICAL THERAPIST

## 2023-11-13 PROCEDURE — 97110 THERAPEUTIC EXERCISES: CPT | Performed by: PHYSICAL THERAPIST
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
## 2023-11-20 ENCOUNTER — TELEPHONE (OUTPATIENT)
Facility: CLINIC | Age: 78
End: 2023-11-20

## 2023-11-20 ENCOUNTER — OFFICE VISIT (OUTPATIENT)
Dept: PHYSICAL THERAPY | Facility: HOSPITAL | Age: 78
End: 2023-11-20
Attending: PHYSICAL MEDICINE & REHABILITATION
Payer: MEDICARE

## 2023-11-20 ENCOUNTER — OFFICE VISIT (OUTPATIENT)
Dept: PHYSICAL MEDICINE AND REHAB | Facility: CLINIC | Age: 78
End: 2023-11-20
Payer: MEDICARE

## 2023-11-20 VITALS — WEIGHT: 232 LBS | BODY MASS INDEX: 36.41 KG/M2 | HEIGHT: 67 IN

## 2023-11-20 DIAGNOSIS — C61 MALIGNANT NEOPLASM OF PROSTATE (HCC): ICD-10-CM

## 2023-11-20 DIAGNOSIS — E53.8 B12 DEFICIENCY: ICD-10-CM

## 2023-11-20 DIAGNOSIS — M51.9 LUMBAR DISC DISEASE: ICD-10-CM

## 2023-11-20 DIAGNOSIS — M43.10 RETROLISTHESIS: ICD-10-CM

## 2023-11-20 DIAGNOSIS — E11.9 DIABETES MELLITUS, STABLE (HCC): ICD-10-CM

## 2023-11-20 DIAGNOSIS — M48.061 SPINAL STENOSIS OF LUMBAR REGION WITHOUT NEUROGENIC CLAUDICATION: ICD-10-CM

## 2023-11-20 DIAGNOSIS — M51.26 LUMBAR HERNIATED DISC: ICD-10-CM

## 2023-11-20 DIAGNOSIS — M48.061 LUMBAR FORAMINAL STENOSIS: ICD-10-CM

## 2023-11-20 DIAGNOSIS — I10 ESSENTIAL HYPERTENSION: ICD-10-CM

## 2023-11-20 DIAGNOSIS — M43.16 SPONDYLOLISTHESIS OF LUMBAR REGION: ICD-10-CM

## 2023-11-20 DIAGNOSIS — M54.16 LUMBAR RADICULOPATHY: Primary | ICD-10-CM

## 2023-11-20 PROCEDURE — 97110 THERAPEUTIC EXERCISES: CPT | Performed by: PHYSICAL THERAPIST

## 2023-11-20 PROCEDURE — 97140 MANUAL THERAPY 1/> REGIONS: CPT | Performed by: PHYSICAL THERAPIST

## 2023-11-20 PROCEDURE — 99214 OFFICE O/P EST MOD 30 MIN: CPT | Performed by: PHYSICAL MEDICINE & REHABILITATION

## 2023-11-20 NOTE — TELEPHONE ENCOUNTER
Christelle (Dr. Zaina Steel out of office)    Reports he goes the bathroom then a little comes out, sometimes a lot comes out. It is mostly brown/dark brown not black. States the odor of his stool is terrible. It isn't like diarrhea where he has to run to the bathroom. He recent had stool testing from Dr. Zaina Steel  When he wipes himself he sees blood on the toilet paper-spot or smear not a lot. Reports history of internal and external hemorrhoids. He is asking for something for relief of this since he thinks his current issues are from the hemorrhoids. When he has the urge to have a bowel movement he gets a little gas then it goes away. He is now on eliquis instead of warfarin the only recent medication change. Reports his anus is raw. He is already using baby wipes instead of dry paper. I advised he try sitz baths. He tried hydrocortisone OTC, but only works short term. He is already on fiber unsure if this has anything to do what is going on. He is asking for something prescription to help with this and wants to know what to do.     Please advise    Thank you    (He told me he goes to therapy at 12:30pm and may not be back until 4/5pm but gave me the ok to leave him a message.)

## 2023-11-20 NOTE — TELEPHONE ENCOUNTER
Called and talked with Mr. Ju Mark. He reiterated the below about his small piece like bowel movements and noting bright red blood on the toilet paper itself. Small internal hemorrhoids noted on cln from 12/2022. Advised for patient to start use of miralax every other day and can start use of Desitin to assist with the irration near his rectum. Patient to contact office with worsening symptoms.      Presley Kearney PA-C

## 2023-11-20 NOTE — TELEPHONE ENCOUNTER
Patient states he is still having issues with diarrhea and noticed blood. Patient thinks he might have hemorrhoids and is itchy. Please call. Thank you.
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
## 2023-11-20 NOTE — PROGRESS NOTES
Low Back Pain H & P    Chief Complaint:   Chief Complaint   Patient presents with    Low Back Pain     LOV 10/10/23 patient present with low back pain. S/p Lumbar Transforaminal Epidural Injection 10/20/2023. Patient states he feel alright. Nursing note reviewed and verified. Patient was last seen on 10/10/2023. I did the left L4 TFESI on 10/20/2023, which helped significantly but the relief was short lived. He has done 4 sessions of the PT with Reny Cueto and he is now at least 50% better. He is now only having right lateral lower leg pain. Victor M Bustamante will occasionally have the low back pain. The right leg pain ranges from 0-8/10. The pain is much better with sitting.     Past Medical History   Past Medical History:   Diagnosis Date    A-fib [facility])     Anxiety     Arrhythmia     AFIB    B12 deficiency     Back problem     BPH (benign prostatic hyperplasia)     Cholecystitis 2009    Chronic atrial fibrillation ([address] 5 years ago    Colon polyp 2016    Depression     Diabetes (HCC)     Esophageal reflux     Essential hypertension     Exposure to medical diagnostic radiation     Hepatic steatosis     High blood pressure     High cholesterol     Hyperlipidemia     Osteoarthritis     Personal history of antineoplastic chemotherapy     Prostate cancer ([address] 2005    Rotator cuff disorder 2001    Sleep apnea     using CPAP at night    Visual impairment     Wears eyeglasses       Past Surgical History   Past Surgical History:   Procedure Laterality Date    ABLATION  01/01/2011    cardiac ablation    CHOLECYSTECTOMY  01/01/2009    COLONOSCOPY  12/01/2011    COLONOSCOPY N/A 12/16/2016    Procedure: COLONOSCOPY;  Surgeon: Zeina Logan MD;  Location: [address] [place] [address] ENDOSCOPY    COLONOSCOPY N/A 12/01/2022    Procedure: COLONOSCOPY;  Surgeon: Erin Rodriguez MD;  Location: [address] [place] [address] ENDOSCOPY    KNEE REPLACEMENT SURGERY      LAPAROSCOPY, SURGICAL PROSTATECTOMY, RETROPUBIC RADICAL, W/NERVE SPARING      OTHER SURGICAL HISTORY  01/01/2001 rotator cuff    OTHER SURGICAL HISTORY N/A     Uvulopalatopharyngoplasty    PROSTATE SURGERY N/A     PROSTATE REMOVAL    RADIATION N/A        Family History   Family History   Problem Relation Age of Onset    Breast Cancer Mother     Heart Disease Mother         CAD    Heart Disease Father         No h/o heart disease       Social History   Social History     Socioeconomic History    Marital status:      Spouse name: Not on file    Number of children: Not on file    Years of education: Not on file    Highest education level: Not on file   Occupational History    Not on file   Tobacco Use    Smoking status: Former     Packs/day: 1.00     Years: 25.00     Additional pack years: 0.00     Total pack years: 25.00     Types: Cigarettes     Quit date: 1978     Years since quittin.9    Smokeless tobacco: Never   Vaping Use    Vaping Use: Never used   Substance and Sexual Activity    Alcohol use: No     Comment: very rare    Drug use: No    Sexual activity: Not on file   Other Topics Concern     Service Not Asked    Blood Transfusions Not Asked    Caffeine Concern No    Occupational Exposure Not Asked    Hobby Hazards Not Asked    Sleep Concern No    Stress Concern Not Asked    Weight Concern Not Asked    Special Diet Not Asked    Back Care Not Asked    Exercise No    Bike Helmet Not Asked    Seat Belt Not Asked    Self-Exams Not Asked   Social History Narrative    The patient does not use an assistive device. .      The patient does not live in a home with stairs. Social Determinants of Health     Financial Resource Strain: Not on file   Food Insecurity: Not on file   Transportation Needs: Not on file   Physical Activity: Not on file   Stress: Not on file   Social Connections: Not on file   Housing Stability: Not on file       PE:  The patient does appear in his stated age in no distress. The patient is well groomed. Psychiatric:  The patient is alert and oriented x 3.   The patient has a normal affect and mood. Respiratory:  No acute respiratory distress. Patient does not have a cough. HEENT:  Extraocular muscles are intact. There is no kern icterus. Pupils are equal, round, and reactive to light. No redness or discharge bilaterally. Skin:  There are no rashes or lesions. Vitals: There were no vitals filed for this visit. Gait:    Gait: Wide Based   Sit to Stand: mild difficulty      Vascular lower extremity:   Dorsalis pedis pulse-RIGHT 2+   Dorsalis pedis pulse-LEFT 2+   Tibialis posterior pulse-RIGHT 2+   Tibialis posterior pulse-LEFT 2+     Neurological Lower Extremity:    Light Touch Sensation: Intact in bilateral Lower Extremities   LE Muscle Strength: All LE strength measurements 5/5 except:  Hamstring RIGHT:   4+/5  Hamstring LEFT:   4+/5   RIGHT plantar reflexes: downward response   LEFT plantar reflexes: downward response   Reflexes: 2+ in bilateral lower extremities     Assessment  1. left L4 and L5-S1 radiculopathy    2. L5-S1 right mild paracentral, L4-5 mild-mod central & right mod far lateral, L3-4 right mild-mod paracentral, L2-3 mild central, L1-2 right mod foraminal HNP's    3. L5-S1 mild diffuse, L4-5 mild-mod diffuse, L3-4 mod diffuse, L2-3 mod-large diffuse, L1-2 right mod foraminal bulging dsics    4. L5-S1 left mod/right mild-mod, L4-5 bilateral mod, L3-4 bilateral mod, L2-3 left mod foraminal stenosis    5. L4-5 mod, L3-4 mod-severe, L2-3 mod central stenosis    6. Diabetes mellitus, stable ([address]    7. B12 deficiency    8. Essential hypertension    9. L4-5 grade 1 spondylolisthesis    10. L2-3 & L3-4 grade 1 retrolisthesis    11. Malignant neoplasm of prostate [facility])         Plan  He will continue with the PT and his home exercise program.    The patient does not need any injections at this time. He will follow up in 3-6 months or sooner if needed. The patient understands and agrees with the stated plan. Reggie Liu MD  11/20/2023

## 2023-11-20 NOTE — PATIENT INSTRUCTIONS
Plan  He will continue with the PT and his home exercise program.    The patient does not need any injections at this time. He will follow up in 3-6 months or sooner if needed.

## 2023-11-27 ENCOUNTER — TELEPHONE (OUTPATIENT)
Facility: CLINIC | Age: 78
End: 2023-11-27

## 2023-11-27 ENCOUNTER — OFFICE VISIT (OUTPATIENT)
Dept: PHYSICAL THERAPY | Facility: HOSPITAL | Age: 78
End: 2023-11-27
Attending: PHYSICAL MEDICINE & REHABILITATION
Payer: MEDICARE

## 2023-11-27 PROCEDURE — 97112 NEUROMUSCULAR REEDUCATION: CPT | Performed by: PHYSICAL THERAPIST

## 2023-11-27 NOTE — TELEPHONE ENCOUNTER
Dr. Julia Mcclure    I spoke to [facility]    He told me that he is having diarrhea, has been on the miralax for 5 days. I told him if he is having diarrhea to stop the miralax and if he starts having harder stools again then can start it back up and titrate as needed. He told me that his anal pain is gone since using the desitin and he only has a few drops of blood when he wipes and thinks it is from his hemorrhoids. He is eating a good amount of fiber. He is worried what to do if stool does not form back up. I told him to stop the miralax for now since loose stools on it and if does not form back up in a few days to call and let us know. He voiced understanding.     Thank you

## 2023-11-28 NOTE — TELEPHONE ENCOUNTER
Spoke with patient this morning    He said that his diarrhea is getting better. He confirmed that he is still holding the miralax. I reread him Silva's recommendations and advised him not to restart the miralax until he has a formed stool. I also advised patient to call us by Friday if he is still having frequent loose stool while holding the miralax. All questions answered, patient verbalized understanding.
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
## 2023-12-01 NOTE — TELEPHONE ENCOUNTER
Dr. Janeth Bourgeois    I spoke to [facility]  He reports he didn't have a bathroom to use so had to wait until he got to the [place]. By the time he got there he had stool all over his clothes - had non stop stool for a little bit. It has now stopped. He told me that this is the most he has ever gone. He has prostate cancer so every time he has a stool he urinates so unable to tell me a true consistency but sounds like a mushy/watery stool  It was Immunologix" with no blood and denied any black stool. He told me that usually his stool sinks, but this stool was floating. He denies any abdominal pain or bloating. He sometimes gets anal itching but not much with the barrier cream.  He is on a high fiber diet and stopped the Miralax on Monday. He asked me if he thinks he has colon cancer-he sounds concerned. Denies any unintentional weight loss recently. Before this he would have the urge to move his bowels when he urinates but would only pass a small piece. He is eating a high fiber diet. I advised that a fiber supplement which helps with both diarrhea or constipation. I also advised that he can try the [address] for a short period until he starts having formed stools again. He would like your input. Please advise.     Thank you

## 2023-12-01 NOTE — TELEPHONE ENCOUNTER
I reviewed below message from Dr. Morenita Benito with the patient. He voiced understanding  All questions answered. He will give us a call if worsening symptoms. Otherwise will call with update on how below plan works for him in a week or two.

## 2023-12-01 NOTE — TELEPHONE ENCOUNTER
Message reviewed, agree with high-fiber diet and fiber supplementation with Metamucil or similar. Okay to use MiraLAX on an as-needed basis however it sounds like if he uses this on a regular basis he has loose stool which is difficult to control. In this case okay to go to as needed basis. I do not feel he has colon cancer. Please reiterate the above with the patient.

## 2023-12-28 ENCOUNTER — TELEPHONE (OUTPATIENT)
Dept: PHYSICAL MEDICINE AND REHAB | Facility: CLINIC | Age: 78
End: 2023-12-28

## 2023-12-28 NOTE — TELEPHONE ENCOUNTER
Pt phoned to inform  Of his on going neck pain and was hoping to see him sooner to discuss options for treatment.  Pt requested a call back   [phone]

## 2024-01-02 ENCOUNTER — APPOINTMENT (OUTPATIENT)
Dept: PHYSICAL THERAPY | Facility: HOSPITAL | Age: 79
End: 2024-01-02
Attending: PHYSICAL MEDICINE & REHABILITATION
Payer: MEDICARE

## 2024-01-09 ENCOUNTER — APPOINTMENT (OUTPATIENT)
Dept: PHYSICAL THERAPY | Facility: HOSPITAL | Age: 79
End: 2024-01-09
Attending: PHYSICAL MEDICINE & REHABILITATION
Payer: MEDICARE

## 2024-01-15 ENCOUNTER — TELEPHONE (OUTPATIENT)
Dept: PHYSICAL THERAPY | Facility: HOSPITAL | Age: 79
End: 2024-01-15

## 2024-01-15 NOTE — TELEPHONE ENCOUNTER
Returned patient's call per his request.  The pt stated he would like to start PT for his neck and shoulder.  States he saw Dr. Reyes regarding the pain and was given muscle relaxers.  States he was unable to tolerate them and would like to try PT.  Explained that the patient would need to get a prescription to start PT and to address any medication related questions to Dr. Reyes.      Please assist with scheduling if the patient is able to obtain a prescription for PT.

## 2024-01-16 ENCOUNTER — APPOINTMENT (OUTPATIENT)
Dept: PHYSICAL THERAPY | Facility: HOSPITAL | Age: 79
End: 2024-01-16
Attending: PHYSICAL MEDICINE & REHABILITATION
Payer: MEDICARE

## 2024-01-17 ENCOUNTER — ORDER TRANSCRIPTION (OUTPATIENT)
Dept: PHYSICAL THERAPY | Facility: HOSPITAL | Age: 79
End: 2024-01-17

## 2024-01-17 ENCOUNTER — TELEPHONE (OUTPATIENT)
Dept: PHYSICAL THERAPY | Facility: HOSPITAL | Age: 79
End: 2024-01-17

## 2024-01-17 DIAGNOSIS — S16.1XXA STRAIN OF NECK MUSCLE: Primary | ICD-10-CM

## 2024-02-01 ENCOUNTER — OFFICE VISIT (OUTPATIENT)
Dept: PHYSICAL MEDICINE AND REHAB | Facility: CLINIC | Age: 79
End: 2024-02-01
Payer: MEDICARE

## 2024-02-01 VITALS — HEIGHT: 67 IN | WEIGHT: 220 LBS | BODY MASS INDEX: 34.53 KG/M2

## 2024-02-01 DIAGNOSIS — M50.90 CERVICAL DISC DISEASE: Primary | ICD-10-CM

## 2024-02-01 DIAGNOSIS — M54.12 CERVICAL RADICULOPATHY AT C6: ICD-10-CM

## 2024-02-01 DIAGNOSIS — M48.02 FORAMINAL STENOSIS OF CERVICAL REGION: ICD-10-CM

## 2024-02-01 DIAGNOSIS — M50.20 CERVICAL DISC HERNIATION: ICD-10-CM

## 2024-02-01 DIAGNOSIS — M48.02 CERVICAL SPINAL STENOSIS: ICD-10-CM

## 2024-02-01 PROCEDURE — 99214 OFFICE O/P EST MOD 30 MIN: CPT | Performed by: PHYSICAL MEDICINE & REHABILITATION

## 2024-02-01 NOTE — PATIENT INSTRUCTIONS
Plan  He will get into the PT with Linsey as scheduled.    If the PT is not helping, then I will do a left C7 TFESI.    The patient will continue with their current pain medications: Ultram and gabapentin.    He will let me know how he is doing after he has had 4 sessions of the PT or if he is getting worse.

## 2024-02-01 NOTE — PROGRESS NOTES
[facility]    Chief Complaint:    Chief Complaint   Patient presents with    Shoulder Pain     LOV 11/20/2023 Patient c/o left shoulder pain that started about one month ago, denies injury. Pain feels like a stabbing/throbbing feeling. Has had [facility] 12/28/2023 which didn't provide any relief. Tries ice with minimal relief. Had shoulder x[facility] at [facility] 8/10     Nursing note reviewed and verified.    Patient was last seen on 11/20/2023.  Then in December, he awoke with left shoulder pain and left shoulder pain.  He saw Dr. Neal on 12/28/2023, and diagnosed him with left shoulder impingement syndrome.  He had a left shoulder injection which did not help.  He then decided to see me.      Description of the Pain  The pain is located in the left base of the skull.    The pain radiates to left shoulder.  The pain at its best is 0/10. The pain at its worst is 10/10. The pain is currently  0/10.  The pain is described as a(n) throbbing sensation.    The patient reports no numbness.  The patient reports no tingling.  There is not weakness in bilateral hands and arms.  The pain is worse  when he turns over in the bed .   The pain is better with nothing.    Past Medical History   Past Medical History:   Diagnosis Date    A-fib (HCC)     Anxiety     Arrhythmia     AFIB    B12 deficiency     Back problem     BPH (benign prostatic hyperplasia)     Cholecystitis 2009    Chronic atrial fibrillation (HCC) 5 years ago    Colon polyp 2016    Depression     Diabetes (HCC)     Esophageal reflux     Essential hypertension     Exposure to medical diagnostic radiation     Hepatic steatosis     High blood pressure     High cholesterol     Hyperlipidemia     Osteoarthritis     Personal history of antineoplastic chemotherapy     Prostate cancer (HCC) 2005    Rotator cuff disorder 2001    Sleep apnea     using CPAP at night    Visual impairment     Wears eyeglasses       Past Surgical History   Past Surgical History:    Procedure Laterality Date    ABLATION  2011    cardiac ablation    CHOLECYSTECTOMY  2009    COLONOSCOPY  2011    COLONOSCOPY N/A 2016    Procedure: COLONOSCOPY;  Surgeon: Bonilla Alonso MD;  Location: [facility] ENDOSCOPY    COLONOSCOPY N/A 2022    Procedure: COLONOSCOPY;  Surgeon: Simone Martin MD;  Location: [facility] ENDOSCOPY    KNEE REPLACEMENT SURGERY      LAPAROSCOPY, SURGICAL PROSTATECTOMY, RETROPUBIC RADICAL, W/NERVE SPARING      OTHER SURGICAL HISTORY  2001    rotator cuff    OTHER SURGICAL HISTORY N/A     Uvulopalatopharyngoplasty    PROSTATE SURGERY N/A     PROSTATE REMOVAL    RADIATION N/A        Family History   Family History   Problem Relation Age of Onset    Breast Cancer Mother     Heart Disease Mother         CAD    Heart Disease Father         No h/o heart disease       Social History   Social History     Socioeconomic History    Marital status:      Spouse name: Not on file    Number of children: Not on file    Years of education: Not on file    Highest education level: Not on file   Occupational History    Not on file   Tobacco Use    Smoking status: Former     Packs/day: 1.00     Years: 25.00     Additional pack years: 0.00     Total pack years: 25.00     Types: Cigarettes     Quit date: 1978     Years since quittin.1    Smokeless tobacco: Never   Vaping Use    Vaping Use: Never used   Substance and Sexual Activity    Alcohol use: Never     Comment: very rare    Drug use: Never    Sexual activity: Not on file   Other Topics Concern     Service Not Asked    Blood Transfusions Not Asked    Caffeine Concern No    Occupational Exposure Not Asked    Hobby Hazards Not Asked    Sleep Concern No    Stress Concern Not Asked    Weight Concern Not Asked    Special Diet Not Asked    Back Care Not Asked    Exercise No    Bike Helmet Not Asked    Seat Belt Not Asked    Self-Exams Not Asked   Social History Narrative    The patient does not  use an assistive device..      The patient does not live in a home with stairs.     Social Determinants of Health     Financial Resource Strain: Not on file   Food Insecurity: Not on file   Transportation Needs: Not on file   Physical Activity: Not on file   Stress: Not on file   Social Connections: Not on file   Housing Stability: Not on file       PE:  The patient does appear in his stated age in no distress.  The patient is well groomed.    Psychiatric:  The patient is alert and oriented x 3.  The patient has a normal affect and mood.      Respiratory:  No acute respiratory distress. Patient does not have a cough.    HEENT:  Extraocular muscles are intact. There is no kern icterus. Pupils are equal, round, and reactive to light. No redness or discharge bilaterally.    Skin:  There are no rashes or lesions.    Lymph Nodes:  The patient has no palpable submandibular, supraclavicular, and cervical lymph nodes..    Vitals:  There were no vitals filed for this visit.    Cervical Spine:    Posture: moderate-severe chin forward superiorly rotated protracted shoulder posture.   Shoulders: Level   Head: In neutral   Spinous Processes Palpations: Tender at C7   Z-Joints Palpations: Tender at  left C4-5, left C5-6, and left C6-7   Muscular Palpations: Tender at  left rhomboid   Cervical Flexion: 45 degrees Gives the patient pain in the left neck   Cervical Extension: 0 degrees Painless   RIGHT rotation: 60 degrees Painless   LEFT rotation: 30 degrees Gives the patient pain in the left neck     Vascular upper extremity:   Right radial pulses: 2+   Left radial pulses: 2+      Neurological Upper Extremity:    Light Touch: Intact in Bilateral upper extremities.   Pin Prick: Not tested.   UE Muscle Strength: All Upper Extremity strength measurements 5/5 except:  ABP Right: 4-/5  ABP Left: 4+/5  Triceps Left: 4/5   Reflexes: 2+ In the bilateral upper extremities.   Bowling's sign Right: Negative   Bowling's sigh Left: Negative      Shoulder: The shoulders are stable.    Medial Border Scapular Winging: absent   Right Scapula: laterally displaced   Left Scapula: laterally displaced   Palpation: Non-tender shoulder palpations.   Right Internal Rotation: normal   Left Internal Rotation: normal   Right External Rotation: normal   Left External Rotation: normal   Shoulder Special Tests: Right and left Ashley test negative.     C-Spine Special Tests  Special Tests: Left Bakody's sign: Positive       Assessment  1. C2-3 mild central, C3-4 mild-mod diffuse, C4-5 left mild foraminal, C5-6 mod-large central, C6-7 right mod far lateral & left mod foraminal bulging discs    2. C4-5 mild-mod central HNP    3. C5-6 mod-severe central, C6-7 mod central spinal stenosis    4. C6-7 bilateral mod-severe, C5-6 bilateral mod foraminal stenosis    5. left C7 radiculopathy        Plan  He will get into the PT with Linsey as scheduled.    If the PT is not helping, then I will do a left C7 TFESI.    The patient will continue with their current pain medications: Ultram and gabapentin.    He will let me know how he is doing after he has had 4 sessions of the PT or if he is getting worse.    The patient understands and agrees with the stated plan.    Denis Goldman MD  2/1/2024

## 2024-02-08 ENCOUNTER — OFFICE VISIT (OUTPATIENT)
Dept: PHYSICAL THERAPY | Facility: HOSPITAL | Age: 79
End: 2024-02-08
Attending: INTERNAL MEDICINE
Payer: MEDICARE

## 2024-02-08 DIAGNOSIS — M50.20 CERVICAL DISC HERNIATION: ICD-10-CM

## 2024-02-08 DIAGNOSIS — M48.02 CERVICAL SPINAL STENOSIS: ICD-10-CM

## 2024-02-08 DIAGNOSIS — M54.12 CERVICAL RADICULOPATHY AT C6: ICD-10-CM

## 2024-02-08 DIAGNOSIS — M48.02 FORAMINAL STENOSIS OF CERVICAL REGION: ICD-10-CM

## 2024-02-08 DIAGNOSIS — M50.90 CERVICAL DISC DISEASE: Primary | ICD-10-CM

## 2024-02-08 PROCEDURE — 97110 THERAPEUTIC EXERCISES: CPT | Performed by: PHYSICAL THERAPIST

## 2024-02-08 PROCEDURE — 97530 THERAPEUTIC ACTIVITIES: CPT | Performed by: PHYSICAL THERAPIST

## 2024-02-08 PROCEDURE — 97162 PT EVAL MOD COMPLEX 30 MIN: CPT | Performed by: PHYSICAL THERAPIST

## 2024-02-08 NOTE — PROGRESS NOTES
CERVICAL SPINE EVALUATION:   Referring Physician: Simone Reyes MD    Diagnosis:  Cervical disc disease (M50.90)  Cervical disc herniation (M50.20)  Cervical spinal stenosis (M48.02)  Foraminal stenosis of cervical region (M48.02)  Cervical radiculopathy at C6 (M54.12) Date of Service: 2/8/2024     Date of Onset: Dec 2023        SUBJECTIVE:   PATIENT SUMMARY:  Arsalan Tomlinson is a 78 year old y/o male who presents to therapy today with complaints of L side cervical spine and UT pain that radiates into the L scapular and top of his L shoulder.    States his pain comes and goes.  States today his pain was bad.  Reports he has more pain in bed.  Reports he has a hard time turning over his L shoulder when driving.      History of current condition:insidious onset  Pain rating:  Current  1/10 VAS  Worst 10/10 VAS      Current functional limitations include limited ability to turn his head L when he driving, difficutly sleeping., limited ability to bend his head to the L side.      Arsalan describes prior level of function independent in all activities. Pt goals include decrease pain, return to his PLOF.    Past medical history was reviewed with Arsalan. Significant findings include      Past Medical History:   Diagnosis Date    A-fib (HCC)     Anxiety     Arrhythmia     AFIB    B12 deficiency     Back problem     BPH (benign prostatic hyperplasia)     Cholecystitis 2009    Chronic atrial fibrillation (HCC) 5 years ago    Colon polyp 2016    Depression     Diabetes (HCC)     Esophageal reflux     Essential hypertension     Exposure to medical diagnostic radiation     Hepatic steatosis     High blood pressure     High cholesterol     Hyperlipidemia     Osteoarthritis     Personal history of antineoplastic chemotherapy     Prostate cancer (HCC) 2005    Rotator cuff disorder 2001    Sleep apnea     using CPAP at night    Visual impairment     Wears eyeglasses     Past Surgical History:   Procedure Laterality Date    ABLATION   01/01/2011    cardiac ablation    CHOLECYSTECTOMY  01/01/2009    COLONOSCOPY  12/01/2011    COLONOSCOPY N/A 12/16/2016    Procedure: COLONOSCOPY;  Surgeon: Bonilla Alonso MD;  Location: [facility] ENDOSCOPY    COLONOSCOPY N/A 12/01/2022    Procedure: COLONOSCOPY;  Surgeon: Simone Martin MD;  Location: [facility] ENDOSCOPY    KNEE REPLACEMENT SURGERY      LAPAROSCOPY, SURGICAL PROSTATECTOMY, RETROPUBIC RADICAL, W/NERVE SPARING      OTHER SURGICAL HISTORY  01/01/2001    rotator cuff    OTHER SURGICAL HISTORY N/A 2003    Uvulopalatopharyngoplasty    PROSTATE SURGERY N/A 2008    PROSTATE REMOVAL    RADIATION N/A 2008       Are you being hurt, frightened, demeaned, or taken advantage of by anyone at your home or in your life?  No    Have you recently had thoughts of hurting yourself?  No   Have you tried to hurt yourself in the past?  No    ASSESSMENT   Arsalan Davi presents to therapy with L sided neck pain, L upper trap/upper shoulder pain and pain in the L scapula.  He has pain with side bending his head to the L and with end range cervical rotation. He reports difficulty looking over his shoulder while driving, looking up to the ceiling and with trying to read a book/perform computer work.  He responded well to repeated rotation L in sitting at the first visit and will benefit from skilled PT to decrease his pain and improve his functional mobility.    Precautions:  cardiac precautions        OBJECTIVE:   Observation/Posture: FHP with forward, rounded shoulders    Cervical AROM:  Pain (+/-)   Flexion 50    Extension 40    R Sidebend 10    L Sidebend 5    R Rotation 70    L Rotation 50    Protrusion WFL    Retraction       Repeated Motion Testing: repeated retraction in sitting - increased radicular symptoms    Repeated L rotation in sitting - decreased/better      CHRISTINE Testing:    CHRISTINE Testing R L   Apical Expansion decreased WFL   Posterior Mediastinum Expansion Test decreased decreased   Standing Reach Test - -   Elevated and ER  Anterior Rib no no       Shoulder AROM:      R    L   Flex  160 160   Abd 160 155   ER Hand behind head Hand behind head   IR Hand to side of waist Hand to side to waist   Hort abd 20 degrees 20 degrees       Strength UE:   5/5 MMT Scale   R  L   Shoulder flex  5   5   Shoulder ext NT NT   Abduction (C5) 5 5   ER 4 4   IR 4 4   Biceps (C6) 5 5   Wrist ext (C6) 5 4   Triceps (C7) 5 5   Wrist flex (C7) 5 5   EPL (C8)  5 5   Interossei (T1) 5 5       Flexibility:   R L   Pec Major short short   Pec Minor short short   Lats short short     [facility]come [facility]  Neck Disability Index Score  Score: 32 % (2/7/2024 12:47 PM)         PLAN OF CARE:    Goals:     The pt was educated on the plan of care, purpose and individual goals for therapy, precautions for therapy.  All questions were answered.     1.  The pt will be independent in their HEP.  2.  Centralization of symptoms to the cervical spine.  3.  The pt will be able to complete a modified workout program  4.  The pt will be able to turn over his L shoulder while driving without an increase in pain.  5.  The pt will report a 50% decrease in symptoms.  6.  The pt will be able to carry groceries without an increase in pain..      Frequency/Duration: Patient will be seen for 1-2 x/week or a total of 10 visits over a 90 day period. Treatment will include: Manual Therapy; Therapeutic Exercises; Neuromuscular Re-education; Therapeutic Activity; Patient education; Home exercise program instruction; TNE Education, Modalities as needed.    Education or treatment limitation: None  Rehab Potential: good    Today's Treatment and Response   2/8/2024  Visit # 1     Manual Therapy    Therapeutic Exercise Repeated retraction in sitting    Repeated L rotation in sitting   Therapeutic Activity Education on centralization of symptoms and precautions for therapy    Education on use of box under feet in sitting   Neuromuscular Education    TNE Education    HEP Repeated rotation in sitting        Total Time:  45 min      Treatment Time: TE1 (13 min), TA1(10 min)    Charges: Eval1, TE1, TA1    In agreement with FOTO score and clinical rationale, this evaluation involved Moderate Complexity decision making due to 3+ personal factors/comorbidities, 4+ body structures involved/activity limitations, and unstable symptoms including changing pain levels.      Patient was advised of these findings, precautions, and treatment options and has agreed to actively participate in planning and for this course of care.    Thank you for your referral. Please co-sign or sign and return this letter via fax as soon as possible to [phone]. If you have any question please contact me at Dept: [phone].    Sincerely,  Electronically signed by therapist: PRABHJOT BURKS PT    Physician’s certification required: Yes  I certify the need for these services furnished under this plan of treatment and while under my care.    X______________________________________________ Date________________  Certification From: 2/8/2024      To: 5/8/2024

## 2024-02-16 ENCOUNTER — OFFICE VISIT (OUTPATIENT)
Dept: PHYSICAL THERAPY | Facility: HOSPITAL | Age: 79
End: 2024-02-16
Attending: INTERNAL MEDICINE
Payer: MEDICARE

## 2024-02-16 PROCEDURE — 97112 NEUROMUSCULAR REEDUCATION: CPT | Performed by: PHYSICAL THERAPIST

## 2024-02-16 PROCEDURE — 97110 THERAPEUTIC EXERCISES: CPT | Performed by: PHYSICAL THERAPIST

## 2024-02-16 NOTE — PROGRESS NOTES
2024  Dx: Cervical disc disease (M50.90)  Cervical disc herniation (M50.20)  Cervical spinal stenosis (M48.02)  Foraminal stenosis of cervical region (M48.02)  Cervical radiculopathy at C6 (M54.12)             Authorized # of Visits:  10 visits on the POC          Next MD visit: none   Fall Risk: standard         Precautions:    Medication Changes since last visit?: No    Subjective: Reports he is feeing better.  States he feels better lying down on bed but states he still has pain when he turns over in bed.    Pain Ratin/10 VAS currently, 8-10/10 VAS at the worst    Objective:        2024  Visit # 1   2024  Visit #2   Manual Therapy     Therapeutic Exercise Repeated retraction in sitting    Repeated L rotation in sitting Repeated retraction in sitting    Retraction L rotation in sitting    Ball squeeze with exhalation in sitting   Therapeutic Activity Education on centralization of symptoms and precautions for therapy    Education on use of box under feet in sitting    Neuromuscular Education  Therapeutic Neuroscience Education (TNE) provided on:  Neurons (firing threshold, sensitive threshold)  The concept of the nervous system as the body's alarm system, and the role of nociception to warn the body of danger  Ion channel sensitivity   How factors such as temperature, stress, movement, immunity and blood flow affect pain via ion channel expression. How these sensors are constanly updated based on the environment   Chronic pain is not always a result of continued tissue injury  Tissues heal  \"Yellow Flags\" such as failed treatments, different explanations for source of pain, family and job stress, and fear can heightened the nervous system sensitivity  Peripheral and Central sensitization  The concepts of hyperalgesia and allodynia were discussed using metaphors to promote deep learning  'Spreading pain' due to \"noisy neighbors\" -increased sensitivity of the nerves in the region, not further  tissue injury  Nerves are a barometer for the stresses of life. The more stressed you are, the more pain you may experience. The calmer you are, the less pain you will experience  Spinal and nerve anatomy with explanation of exit from foramen and path through body  3 things nerves need to perform at their best: space, movement, and blood flow  Nociception and nociceptive pathways  The concepts of pacing, graded exposure, 'sore but safe' and 'hurt does not equal harm' were discussed  Plasticity of the nervous system   Patient was educated regarding endogenous mechanisms and strategies to increase the brain's production of chemicals which decrease pain such as aerobic exercise and improved pain knowledge (how to make a 'dry' brain 'wet')  Anticipated recovery time: nerve sensitivty will decrease over weeks and months. Recovery will have some 'ups and downs' and flare ups. This is expected; not due to harm but rather sensitivity    Current best evidence research indicates that when a therapist explains to a pt the neuroscience of their pain exerpience and the pt truly understands it, they have less pain, less disability, move better, perform better with rehabilitation, have better cognitions regarding their pain and decreased sensitization of their nervous system    Education: Patient educated on the relationship between emotions and pain, and the role that fear, catastrophization, nociception and threat play in persistent pain, by activating the bodies alarm system, resulting in hypersensitive nerves. Metaphors incorporated to foster deep learning and paradigm shift for patient.        TNE Education     HEP Repeated rotation in sitting Repeated retraction in sitting and ball squeeze       Assessment: No adverse effects to treatment.  The pt reported less pain this date and was able to tolerate retraction in sitting this date.  Also added ball squeezes to improve IO/TA recruitment this date.  Educated the patient on  his emotional health can influence his pain.       Goals:     The pt was educated on the plan of care, purpose and individual goals for therapy, precautions for therapy.  All questions were answered.     1.  The pt will be independent in their HEP.  2.  Centralization of symptoms to the cervical spine.  3.  The pt will be able to complete a modified workout program  4.  The pt will be able to turn over his L shoulder while driving without an increase in pain.  5.  The pt will report a 50% decrease in symptoms.  6.  The pt will be able to carry groceries without an increase in pain..      Frequency/Duration: Patient will be seen for 1-2 x/week or a total of 10 visits over a 90 day period. Treatment will include: Manual Therapy; Therapeutic Exercises; Neuromuscular Re-education; Therapeutic Activity; Patient education; Home exercise program instruction; TNE Education, Modalities as needed.    Education or treatment limitation: None  Rehab Potential: good    Charges: TE2 (23), NM1 (15)       Total Timed Treatment: 38 min  Total Treatment Time: 38 min    Certification From: 2/8/2024      To: 5/8/2024                  FOR REFERENCE ONLY  CERVICAL SPINE EVALUATION:   Referring Physician: Simone Reyes MD    Diagnosis:  Cervical disc disease (M50.90)  Cervical disc herniation (M50.20)  Cervical spinal stenosis (M48.02)  Foraminal stenosis of cervical region (M48.02)  Cervical radiculopathy at C6 (M54.12) Date of Service: 2/8/2024     Date of Onset: Dec 2023        SUBJECTIVE:   PATIENT SUMMARY:  Arsalan Tomlinson is a 78 year old y/o male who presents to therapy today with complaints of L side cervical spine and UT pain that radiates into the L scapular and top of his L shoulder.    States his pain comes and goes.  States today his pain was bad.  Reports he has more pain in bed.  Reports he has a hard time turning over his L shoulder when driving.      History of current condition:insidious onset  Pain rating:  Current   1/10 VAS  Worst 10/10 VAS      Current functional limitations include limited ability to turn his head L when he driving, difficutly sleeping., limited ability to bend his head to the L side.      Arsalan describes prior level of function independent in all activities. Pt goals include decrease pain, return to his PLOF.    Past medical history was reviewed with Arsalan. Significant findings include      Past Medical History:   Diagnosis Date    A-fib (HCC)     Anxiety     Arrhythmia     AFIB    B12 deficiency     Back problem     BPH (benign prostatic hyperplasia)     Cholecystitis 2009    Chronic atrial fibrillation (HCC) 5 years ago    Colon polyp 2016    Depression     Diabetes (HCC)     Esophageal reflux     Essential hypertension     Exposure to medical diagnostic radiation     Hepatic steatosis     High blood pressure     High cholesterol     Hyperlipidemia     Osteoarthritis     Personal history of antineoplastic chemotherapy     Prostate cancer (HCC) 2005    Rotator cuff disorder 2001    Sleep apnea     using CPAP at night    Visual impairment     Wears eyeglasses     Past Surgical History:   Procedure Laterality Date    ABLATION  01/01/2011    cardiac ablation    CHOLECYSTECTOMY  01/01/2009    COLONOSCOPY  12/01/2011    COLONOSCOPY N/A 12/16/2016    Procedure: COLONOSCOPY;  Surgeon: Bonilla Alonso MD;  Location: [facility] ENDOSCOPY    COLONOSCOPY N/A 12/01/2022    Procedure: COLONOSCOPY;  Surgeon: Simone Martin MD;  Location: [facility] ENDOSCOPY    KNEE REPLACEMENT SURGERY      LAPAROSCOPY, SURGICAL PROSTATECTOMY, RETROPUBIC RADICAL, W/NERVE SPARING      OTHER SURGICAL HISTORY  01/01/2001    rotator cuff    OTHER SURGICAL HISTORY N/A 2003    Uvulopalatopharyngoplasty    PROSTATE SURGERY N/A 2008    PROSTATE REMOVAL    RADIATION N/A 2008       Are you being hurt, frightened, demeaned, or taken advantage of by anyone at your home or in your life?  No    Have you recently had thoughts of hurting yourself?  No   Have you  tried to hurt yourself in the past?  No    ASSESSMENT   Arsalan Tomlinson presents to therapy with L sided neck pain, L upper trap/upper shoulder pain and pain in the L scapula.  He has pain with side bending his head to the L and with end range cervical rotation. He reports difficulty looking over his shoulder while driving, looking up to the ceiling and with trying to read a book/perform computer work.  He responded well to repeated rotation L in sitting at the first visit and will benefit from skilled PT to decrease his pain and improve his functional mobility.    Precautions:  cardiac precautions        OBJECTIVE:   Observation/Posture: FHP with forward, rounded shoulders    Cervical AROM:  Pain (+/-)   Flexion 50    Extension 40    R Sidebend 10    L Sidebend 5    R Rotation 70    L Rotation 50    Protrusion WFL    Retraction       Repeated Motion Testing: repeated retraction in sitting - increased radicular symptoms    Repeated L rotation in sitting - decreased/better      CHRISTINE Testing:    CHRISTINE Testing R L   Apical Expansion decreased WFL   Posterior Mediastinum Expansion Test decreased decreased   Standing Reach Test - -   Elevated and ER Anterior Rib no no       Shoulder AROM:      R    L   Flex  160 160   Abd 160 155   ER Hand behind head Hand behind head   IR Hand to side of waist Hand to side to waist   Hort abd 20 degrees 20 degrees       Strength UE:   5/5 MMT Scale   R  L   Shoulder flex  5   5   Shoulder ext NT NT   Abduction (C5) 5 5   ER 4 4   IR 4 4   Biceps (C6) 5 5   Wrist ext (C6) 5 4   Triceps (C7) 5 5   Wrist flex (C7) 5 5   EPL (C8)  5 5   Interossei (T1) 5 5       Flexibility:   R L   Pec Major short short   Pec Minor short short   Lats short short     Outcome [facility]  Neck Disability Index Score  Score: 32 % (2/7/2024 12:47 PM)

## 2024-02-21 ENCOUNTER — TELEPHONE (OUTPATIENT)
Dept: PHYSICAL THERAPY | Facility: HOSPITAL | Age: 79
End: 2024-02-21

## 2024-02-23 ENCOUNTER — APPOINTMENT (OUTPATIENT)
Dept: PHYSICAL THERAPY | Facility: HOSPITAL | Age: 79
End: 2024-02-23
Attending: INTERNAL MEDICINE
Payer: MEDICARE

## 2024-03-07 ENCOUNTER — APPOINTMENT (OUTPATIENT)
Dept: PHYSICAL THERAPY | Facility: HOSPITAL | Age: 79
End: 2024-03-07
Attending: INTERNAL MEDICINE
Payer: MEDICARE

## 2024-03-14 ENCOUNTER — APPOINTMENT (OUTPATIENT)
Dept: PHYSICAL THERAPY | Facility: HOSPITAL | Age: 79
End: 2024-03-14
Attending: INTERNAL MEDICINE
Payer: MEDICARE

## 2024-03-21 ENCOUNTER — APPOINTMENT (OUTPATIENT)
Dept: PHYSICAL THERAPY | Facility: HOSPITAL | Age: 79
End: 2024-03-21
Attending: INTERNAL MEDICINE
Payer: MEDICARE

## 2024-08-12 ENCOUNTER — HOSPITAL ENCOUNTER (OUTPATIENT)
Age: 79
Discharge: HOME | End: 2024-08-12
Payer: MEDICARE

## 2024-08-12 ENCOUNTER — APPOINTMENT (OUTPATIENT)
Dept: GENERAL RADIOLOGY | Age: 79
End: 2024-08-12
Attending: Physician Assistant
Payer: MEDICARE

## 2024-08-12 VITALS
SYSTOLIC BLOOD PRESSURE: 135 MMHG | RESPIRATION RATE: 19 BRPM | OXYGEN SATURATION: 98 % | DIASTOLIC BLOOD PRESSURE: 72 MMHG | HEART RATE: 64 BPM | TEMPERATURE: 97 F

## 2024-08-12 DIAGNOSIS — S93.601A SPRAIN OF RIGHT FOOT, INITIAL ENCOUNTER: ICD-10-CM
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
## 2024-08-12 DIAGNOSIS — S93.401A SPRAIN OF RIGHT ANKLE, UNSPECIFIED LIGAMENT, INITIAL ENCOUNTER: ICD-10-CM

## 2024-08-12 PROCEDURE — 99213 OFFICE O/P EST LOW 20 MIN: CPT | Performed by: PHYSICIAN ASSISTANT

## 2024-08-12 PROCEDURE — 73630 X-RAY EXAM OF FOOT: CPT | Performed by: PHYSICIAN ASSISTANT

## 2024-08-12 PROCEDURE — 73610 X-RAY EXAM OF ANKLE: CPT | Performed by: PHYSICIAN ASSISTANT

## 2024-08-12 NOTE — ED INITIAL ASSESSMENT (HPI)
Patient arrived ambulatory to room with cane. Patient tripped and fell yesterday. Patient c/o pain to the right foot. +swelling. Patient denies hitting his head.

## 2024-08-12 NOTE — DISCHARGE INSTRUCTIONS
Rest, ice, and elevate ankle and foot   Tylenol as needed for pain   Drink plenty of fluids   Get plenty of rest   Avoid excessive twisting, bending, or lifting   Follow up with your primary care provider

## 2024-08-12 NOTE — ED PROVIDER NOTES
Chief Complaint   Patient presents with    Fall       History obtained from: patient   services not used    HPI:     Arsalan Tomlinson is a 78 year old male who presents with right foot and ankle pain following witnessed mechanical fall yesterday. Patient states his daughter's dog jumped on him causing him to fall outside. Patient states he landed on his knees but twisted his right foot and ankle in the fall. Patient only complains of pain and swelling to right foot and ankle. Patient denies any other injuries sustained or complaints at this time.  Denies head injury, LOC, headache, amnesia, neck pain, back pain, numbness, weakness, wound, change in skin color/temperature.    PMH  Past Medical History:    A-fib (HCC)    Anxiety    Arrhythmia    AFIB    B12 deficiency    Back problem    BPH (benign prostatic hyperplasia)    Cholecystitis    Chronic atrial fibrillation (HCC)    Colon polyp    Depression    Diabetes (HCC)    Esophageal reflux    Essential hypertension    Exposure to medical diagnostic radiation    Hepatic steatosis    High blood pressure    High cholesterol    Hyperlipidemia    Osteoarthritis    Personal history of antineoplastic chemotherapy    Prostate cancer (HCC)    Rotator cuff disorder    Sleep apnea    using CPAP at night    Visual impairment    Wears eyeglasses       PFSH    PFSH asessment screens reviewed and agree.  Nurses notes reviewed I agree with documentation.    Family History   Problem Relation Age of Onset    Breast Cancer Mother     Heart Disease Mother         CAD    Heart Disease Father         No h/o heart disease     Family history reviewed with patient/caregiver and is not pertinent to presenting problem.  Social History     Socioeconomic History    Marital status:      Spouse name: Not on file    Number of children: Not on file    Years of education: Not on file    Highest education level: Not on file   Occupational History    Not on file   Tobacco Use    Smoking  status: Former     Current packs/day: 0.00     Average packs/day: 1 pack/day for 25.0 years (25.0 ttl pk-yrs)     Types: Cigarettes     Start date: 1953     Quit date: 1978     Years since quittin.6    Smokeless tobacco: Never   Vaping Use    Vaping status: Never Used   Substance and Sexual Activity    Alcohol use: Never     Comment: very rare    Drug use: Never    Sexual activity: Not on file   Other Topics Concern     Service Not Asked    Blood Transfusions Not Asked    Caffeine Concern No    Occupational Exposure Not Asked    Hobby Hazards Not Asked    Sleep Concern No    Stress Concern Not Asked    Weight Concern Not Asked    Special Diet Not Asked    Back Care Not Asked    Exercise No    Bike Helmet Not Asked    Seat Belt Not Asked    Self-Exams Not Asked   Social History Narrative    The patient does not use an assistive device..      The patient does not live in a home with stairs.     Social Determinants of Health     Financial Resource Strain: Not on file   Food Insecurity: Not on file   Transportation Needs: Not on file   Physical Activity: Not on file   Stress: Not on file   Social Connections: Not on file   Housing Stability: Not on file         ROS:   Positive for stated complaint: Right foot and ankle pain and swelling  All other systems reviewed and negative except as noted above.  Constitutional and Vital Signs Reviewed.    Physical Exam:     Findings:    /72   Pulse 64   Temp 97.1 °F (36.2 °C) (Temporal)   Resp 19   SpO2 98%   GENERAL: well developed, no acute distress, non-toxic appearing   SKIN: good skin turgor, no obvious rashes  HEAD: normocephalic, atraumatic  EYES: sclera non-icteric bilaterally, conjunctiva clear bilaterally  OROPHARYNX: MMM, maintaining airway and secretions  NECK: supple, no lymphadenopathy, no nuchal rigidity, no trismus, no edema, phonation normal    CARDIO: Regular rate, DP pulse 2+ bilaterally, cap refill less than 2 seconds  LUNGS: no  increased WOB  EXTREMITIES: Swelling to right ankle, swelling and tenderness to dorsal right midfoot and first MTP, no obvious deformity, F ROM, compartments soft, CMS intact, skin intact  NEURO: no focal deficits  PSYCH: alert and oriented x3, answering questions appropriately, mood appropriate    MDM/Assessment/Plan:   Orders for this encounter:    Orders Placed This Encounter    XR ANKLE (MIN 3 VIEWS), RIGHT (CPT=73610)     Pain in right foot Patient arrived ambulatory to room with cane. Patient tripped and fell   yesterday. Patient c/o pain to the right foot. +swelling. Patient denies   hitting his head.        Order Specific Question:   What is the Relevant Clinical Indication / Reason for Exam?     Answer:   Pain in right foot    XR FOOT, COMPLETE (MIN 3 VIEWS), RIGHT (CPT=73630)     Pain in right foot Patient arrived ambulatory to room with cane. Patient tripped and fell   yesterday. Patient c/o pain to the right foot. +swelling. Patient denies   hitting his head.        Order Specific Question:   What is the Relevant Clinical Indication / Reason for Exam?     Answer:   Pain in right foot    Ace Wrap    Post-op shoe       Labs performed this visit:  No results found for this or any previous visit (from the past 10 hour(s)).    Imaging performed this visit:  XR FOOT, COMPLETE (MIN 3 VIEWS), RIGHT (CPT=73630)   Final Result   PROCEDURE: XR FOOT, COMPLETE (MIN 3 VIEWS), RIGHT (CPT=73630)       COMPARISON: None.       INDICATIONS: Right medial foot pain, swelling post fall x1 day.       TECHNIQUE: 3 views were obtained.         FINDINGS:    BONES: No acute appearing fracture or dislocation.  Moderately severe    degenerative narrowing of the right 1st metatarsophalangeal joint.  Small    calcaneal plantar enthesophyte.  Mild enthesopathy of the Achilles tendon    insertion.   SOFT TISSUES: Moderate dorsal soft tissue swelling of the midfoot.     Correlate clinically.   EFFUSION: None visible.    OTHER: Negative.                     =====   CONCLUSION:    1. No acute appearing fracture or dislocation.  Moderately severe    degenerative narrowing of the right 1st metatarsophalangeal joint.     Moderate dorsal soft tissue swelling of the midfoot.  Correlate    clinically.               Dictated by (CST): Lalo La MD on 8/12/2024 at 8:45 AM        Finalized by (CST): Lalo La MD on 8/12/2024 at 8:47 AM               XR ANKLE (MIN 3 VIEWS), RIGHT (CPT=73610)   Final Result   PROCEDURE: XR ANKLE (MIN 3 VIEWS), RIGHT (CPT=73610)       COMPARISON: None.       INDICATIONS: Right medial foot pain, swelling post fall x1 day.       TECHNIQUE: 3 views (AP, mortise, and lateral projections) were obtained.         FINDINGS:    BONES: Small osteophytes along the dorsal aspect of the talar head-neck.     Calcaneal spur at the insertion of the plantar fascia.  Enthesophyte in    the posterior calcaneus at the Achilles tendon insertion.  Otherwise, no    significant arthropathy, fracture,    or acute abnormality.  The tibiotalar joint space is maintained on the    mortise projection.   SOFT TISSUES: There is soft tissue swelling most prominent over the    malleoli.  Scattered calcific atherosclerosis in the lower extremity.   EFFUSION: There is a small ankle effusion.   OTHER: Negative.                     =====   CONCLUSION:    Soft tissue swelling with ankle effusion. No definite osseous abnormality.    Findings are compatible with acute right ankle sprain.       Dictated by (CST): Wolf Monroe MD on 8/12/2024 at 8:43 AM        Finalized by (CST): Wolf Monroe MD on 8/12/2024 at 8:50 AM                   Medical Decision Making  DDx includes fracture versus sprain versus contusion versus other.  Patient is overall well-appearing with stable vitals following witnessed mechanical fall without head injury or LOC yesterday.  Patient denies any injuries sustained or complaints other than right foot and ankle pain.  No signs of  neurovascular compromise or compartment syndrome.  X-ray of right ankle reviewed, no evidence of acute osseous abnormality, soft tissue swelling with ankle effusion noted.  X-ray of right foot reviewed, no evidence of acute osseous abnormality, soft tissue swelling to dorsal midfoot noted.  Discussed results with patient, patient reassured by these results.  Ace wrap and Ortho shoe applied to right foot.  Discussed supportive care including rest, ice, elevation, compression, and OTC Tylenol as needed for pain.  Instructed patient to go directly to nearest ER with any worsening or concerning symptoms.  Follow-up with PCP.    Amount and/or Complexity of Data Reviewed  Radiology: ordered and independent interpretation performed.    Risk  OTC drugs.          Diagnosis:    ICD-10-CM    1. Fall  W19.XXXA XR ANKLE (MIN 3 VIEWS), RIGHT (CPT=73610)     XR FOOT, COMPLETE (MIN 3 VIEWS), RIGHT (CPT=73630)     XR ANKLE (MIN 3 VIEWS), RIGHT (CPT=73610)     XR FOOT, COMPLETE (MIN 3 VIEWS), RIGHT (CPT=73630)      2. Sprain of right foot, initial encounter  S93.601A       3. Sprain of right ankle, unspecified ligament, initial encounter  S93.401A           All results reviewed and discussed with patient/patient's family. Patient/patient's family verbalize excellent understanding of instructions and feels comfortable with plan. All of patient's/patient's family's questions were addressed.   See AVS for detailed discharge instructions for your condition today.    Follow Up with:  Simone Reyes MD  [address] [place] [address]  [address]  [facility] [id number] [phone]            Note: This document was dictated using Dragon medical dictation software.  Proofreading was performed to the best of my ability, but errors may be present.    Evelyn Garcia PA-C

## 2024-08-21 ENCOUNTER — TELEPHONE (OUTPATIENT)
Dept: ORTHOPEDICS CLINIC | Facility: CLINIC | Age: 79
End: 2024-08-21

## 2024-08-21 ENCOUNTER — HOSPITAL ENCOUNTER (OUTPATIENT)
Age: 79
Discharge: HOME | End: 2024-08-21
Payer: MEDICARE

## 2024-08-21 ENCOUNTER — APPOINTMENT (OUTPATIENT)
Dept: ULTRASOUND IMAGING | Age: 79
End: 2024-08-21
Attending: PHYSICIAN ASSISTANT
Payer: MEDICARE

## 2024-08-21 ENCOUNTER — APPOINTMENT (OUTPATIENT)
Dept: GENERAL RADIOLOGY | Age: 79
End: 2024-08-21
Attending: PHYSICIAN ASSISTANT
Payer: MEDICARE

## 2024-08-21 VITALS
RESPIRATION RATE: 16 BRPM | TEMPERATURE: 97 F | HEART RATE: 74 BPM | OXYGEN SATURATION: 96 % | DIASTOLIC BLOOD PRESSURE: 75 MMHG | SYSTOLIC BLOOD PRESSURE: 146 MMHG
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
## 2024-08-21 DIAGNOSIS — Z87.39 PERSONAL HISTORY OF GOUT: ICD-10-CM

## 2024-08-21 DIAGNOSIS — I87.2 VENOUS INSUFFICIENCY OF LOWER EXTREMITY: ICD-10-CM

## 2024-08-21 DIAGNOSIS — I83.90 VARICOSE VEIN: ICD-10-CM

## 2024-08-21 DIAGNOSIS — M19.071 OSTEOARTHRITIS OF FIRST METATARSOPHALANGEAL (MTP) JOINT OF RIGHT FOOT: ICD-10-CM

## 2024-08-21 DIAGNOSIS — L03.115 CELLULITIS OF RIGHT FOOT: Primary | ICD-10-CM

## 2024-08-21 DIAGNOSIS — I87.2 VENOUS STASIS DERMATITIS OF BOTH LOWER EXTREMITIES: ICD-10-CM

## 2024-08-21 LAB
#MXD IC: 0.5 X10ˆ3/UL (ref 0.1–1)
BUN BLD-MCNC: 14 MG/DL (ref 7–18)
CHLORIDE BLD-SCNC: 101 MMOL/L (ref 98–112)
CO2 BLD-SCNC: 28 MMOL/L (ref 21–32)
CREAT BLD-MCNC: 1.1 MG/DL
EGFRCR SERPLBLD CKD-EPI 2021: 70 ML/MIN/1.73M2 (ref 60–?)
GLUCOSE BLD-MCNC: 121 MG/DL (ref 70–99)
HCT VFR BLD AUTO: 38.5 %
HCT VFR BLD CALC: 40 %
HGB BLD-MCNC: 12.6 G/DL
ISTAT IONIZED CALCIUM FOR CHEM 8: 1.24 MMOL/L (ref 1.12–1.32)
LYMPHOCYTES # BLD AUTO: 2 X10ˆ3/UL (ref 1–4)
LYMPHOCYTES NFR BLD AUTO: 33.7 %
MCH RBC QN AUTO: 29.9 PG (ref 26–34)
MCHC RBC AUTO-ENTMCNC: 32.7 G/DL (ref 31–37)
MCV RBC AUTO: 91.2 FL (ref 80–100)
MIXED CELL %: 7.9 %
NEUTROPHILS # BLD AUTO: 3.3 X10ˆ3/UL (ref 1.5–7.7)
NEUTROPHILS NFR BLD AUTO: 58.4 %
PLATELET # BLD AUTO: 268 X10ˆ3/UL (ref 150–450)
POTASSIUM BLD-SCNC: 3.6 MMOL/L (ref 3.6–5.1)
RBC # BLD AUTO: 4.22 X10ˆ6/UL
SODIUM BLD-SCNC: 141 MMOL/L (ref 136–145)
WBC # BLD AUTO: 5.8 X10ˆ3/UL (ref 4–11)

## 2024-08-21 PROCEDURE — 99214 OFFICE O/P EST MOD 30 MIN: CPT

## 2024-08-21 PROCEDURE — 93971 EXTREMITY STUDY: CPT | Performed by: PHYSICIAN ASSISTANT

## 2024-08-21 PROCEDURE — 85025 COMPLETE CBC W/AUTO DIFF WBC: CPT | Performed by: PHYSICIAN ASSISTANT

## 2024-08-21 PROCEDURE — 99215 OFFICE O/P EST HI 40 MIN: CPT

## 2024-08-21 PROCEDURE — 80047 BASIC METABLC PNL IONIZED CA: CPT

## 2024-08-21 PROCEDURE — 73630 X-RAY EXAM OF FOOT: CPT | Performed by: PHYSICIAN ASSISTANT

## 2024-08-21 PROCEDURE — 36415 COLL VENOUS BLD VENIPUNCTURE: CPT

## 2024-08-21 NOTE — ED PROVIDER NOTES
Patient Seen in: [facility] [place]      History     Chief Complaint   Patient presents with    Foot Pain     Stated Complaint: R foot injury from fall    Subjective:   HPI    Patient is a 78-year-old male with obesity, hypertension, hyperlipidemia, diabetes, atrial fibrillation, sleep apnea, GERD, anxiety, depression, gout, presenting to immediate care for evaluation of worsening right forefoot pain, swelling status post mechanical fall on 8/12/2024.  Patient was seen at our [place] immediate care location in which he had x-ray imaging of right ankle and foot.  Negative for underlying fracture.  Severe osteoarthritis at first MTP and findings suggestive of ankle sprain and right ankle effusion.  Was provided Ace wrap and Ortho shoe for comfort.  He has been icing affected area taking Tylenol and ibuprofen for symptoms with minimal relief.  Did take oxycodone which she discontinued secondary to making him too drowsy.  Now he is having associated redness and warmth base of right great toe extending to forefoot and leg.  Unsure if possible related to gout.  Coming to immediate care for further evaluation.  Denies any fevers or chills or myalgias.  No sores or ulcers or lesions.  No drainage.  He is ambulating with steady gait without assistance.    Objective:   Past Medical History:    A-fib (HCC)    Anxiety    Arrhythmia    AFIB    B12 deficiency    Back problem    BPH (benign prostatic hyperplasia)    Cholecystitis    Chronic atrial fibrillation (HCC)    Colon polyp    Depression    Diabetes (HCC)    Esophageal reflux    Essential hypertension    Exposure to medical diagnostic radiation    Hepatic steatosis    High blood pressure    High cholesterol    Hyperlipidemia    Osteoarthritis    Personal history of antineoplastic chemotherapy    Prostate cancer (HCC)    Rotator cuff disorder    Sleep apnea    using CPAP at night    Visual impairment    Wears eyeglasses              Past Surgical History:   Procedure  Laterality Date    Ablation  2011    cardiac ablation    Cholecystectomy  2009    Colonoscopy  2011    Colonoscopy N/A 2016    Procedure: COLONOSCOPY;  Surgeon: Bonilla Alonso MD;  Location: [facility] ENDOSCOPY    Colonoscopy N/A 2022    Procedure: COLONOSCOPY;  Surgeon: Simone Martin MD;  Location: [facility] ENDOSCOPY    Knee replacement surgery      Laparoscopy, surgical prostatectomy, retropubic radical, w/nerve sparing      Other surgical history  2001    rotator cuff    Other surgical history N/A     Uvulopalatopharyngoplasty    Prostate surgery N/A     PROSTATE REMOVAL    Radiation N/A                 Social History     Socioeconomic History    Marital status:    Tobacco Use    Smoking status: Former     Current packs/day: 0.00     Average packs/day: 1 pack/day for 25.0 years (25.0 ttl pk-yrs)     Types: Cigarettes     Start date: 1953     Quit date: 1978     Years since quittin.6    Smokeless tobacco: Never   Vaping Use    Vaping status: Never Used   Substance and Sexual Activity    Alcohol use: Never     Comment: very rare    Drug use: Never   Other Topics Concern    Caffeine Concern No    Sleep Concern No    Exercise No   Social History Narrative    The patient does not use an assistive device..      The patient does not live in a home with stairs.              Review of Systems   Constitutional:  Negative for chills and fever.   Gastrointestinal:  Negative for abdominal pain, nausea and vomiting.   Musculoskeletal:  Positive for joint swelling. Negative for back pain and neck pain.        Right forefoot pain and swelling   Skin:  Positive for color change and rash.   Neurological:  Negative for weakness and numbness.   Psychiatric/Behavioral:  Negative for confusion.        Positive for stated Chief Complaint: Foot Pain    Other systems are as noted in HPI.  Constitutional and vital signs reviewed.      All other systems reviewed and negative  except as noted above.    Physical Exam     ED Triage Vitals [08/21/24 0841]   /75   Pulse 74   Resp 16   Temp 97.1 °F (36.2 °C)   Temp src Temporal   SpO2 96 %   O2 Device None (Room air)       Current Vitals:   Vital Signs  BP: 146/75  Pulse: 74  Resp: 16  Temp: 97.1 °F (36.2 °C)  Temp src: Temporal    Oxygen Therapy  SpO2: 96 %  O2 Device: None (Room air)            Physical Exam  Vitals and nursing note reviewed.   Constitutional:       General: He is not in acute distress.     Appearance: Normal appearance. He is not ill-appearing, toxic-appearing or diaphoretic.   HENT:      Head: Normocephalic and atraumatic.      Mouth/Throat:      Mouth: Mucous membranes are moist.   Eyes:      General: No scleral icterus.  Cardiovascular:      Rate and Rhythm: Normal rate.      Pulses: Normal pulses.   Pulmonary:      Effort: Pulmonary effort is normal. No respiratory distress.   Musculoskeletal:         General: Swelling and tenderness present. Normal range of motion.      Comments: Tenderness to palpation with soft tissue swelling forefoot of right foot.  Predominately tender base of right great toe.  There is surrounding erythema and warmth predominately first toe.  Erythema extends to midfoot dorsum.  Warmth present.  There is no ulcerations or lesions.  Compartments are soft.  Pulses intact.  Capillary  refill less 2 seconds.  DP PT pulse intact.  No calf tenderness or swelling.  Negative Homans' sign.  Venous stasis dermatitis bilateral lower legs.  Venous insufficiency.  Varicose veins   Skin:     General: Skin is warm.      Findings: Erythema present.   Neurological:      General: No focal deficit present.      Mental Status: He is alert and oriented to person, place, and time.      Motor: No weakness.   Psychiatric:         Mood and Affect: Mood normal.         Behavior: Behavior normal.           ED Course       Results for orders placed or performed during the hospital encounter of 08/21/24   POCT CBC     Collection Time: 08/21/24  8:55 AM   Result Value Ref Range    WBC IC 5.8 4.0 - 11.0 x10ˆ3/uL    RBC IC 4.22 3.80 - 5.80 X10ˆ6/uL    HGB IC 12.6 (L) 13.0 - 17.5 g/dL    HCT IC 38.5 (L) 39.0 - 53.0 %    MCV IC 91.2 80.0 - 100.0 fL    MCH IC 29.9 26.0 - 34.0 pg    MCHC IC 32.7 31.0 - 37.0 g/dL    PLT .0 150.0 - 450.0 X10ˆ3/uL    # Neutrophil 3.3 1.5 - 7.7 X10ˆ3/uL    # Lymphocyte 2.0 1.0 - 4.0 X10ˆ3/uL    # Mixed Cells 0.5 0.1 - 1.0 X10ˆ3/uL    Neutrophil % 58.4 %    Lymphocyte % 33.7 %    Mixed Cell % 7.9 %   POCT ISTAT chem8 cartridge    Collection Time: 08/21/24  9:12 AM   Result Value Ref Range    ISTAT Sodium 141 136 - 145 mmol/L    ISTAT BUN 14 7 - 18 mg/dL    ISTAT Potassium 3.6 3.6 - 5.1 mmol/L    ISTAT Chloride 101 98 - 112 mmol/L    ISTAT Ionized Calcium 1.24 1.12 - 1.32 mmol/L    ISTAT Hematocrit 40 37 - 53 %    ISTAT Glucose 121 (H) 70 - 99 mg/dL    ISTAT TCO2 28 21 - 32 mmol/L    ISTAT Creatinine 1.10 0.70 - 1.30 mg/dL    eGFR-Cr 70 >=60 mL/min/1.73m2     US VENOUS DOPPLER LEG RIGHT - DIAG IMG (CPT=93971)   Final Result   PROCEDURE: US VENOUS DOPPLER LEG RIGHT-DIAG IMG (CPT=93971)       COMPARISON: [facility], US VENOUS DOPPLER LEG RIGHT-DIAG    IMG (CPT=93971), 8/03/2023, 2:33 PM.       INDICATIONS: Right lower extremity pain, status post fall.       TECHNIQUE: Color duplex Doppler venous ultrasound of the right lower    extremity was performed in the usual manner.       FINDINGS: The femoral and popliteal veins appear normal. Visualized    portions of the great and small saphenous, posterior tibial, and peroneal    veins have a sonographically unremarkable appearance.         GRAYSCALE: There are no visible echogenic thrombi. Compressibility of the    venous system is demonstrated.   COLOR DOPPLER: Flow is present throughout the imaged veins.   SPECTRAL: Where interrogated, augmentation is visualized on compression.     OTHER: Negative for discernible fluid collection in the imaged  region.                       =====   CONCLUSION:    Negative for sonographic evidence of deep venous thrombosis in the right    lower extremity.               Dictated by (CST): Jose Castaneda MD on 8/21/2024 at 9:47 AM        Finalized by (CST): Jose Castaneda MD on 8/21/2024 at 9:47 AM               XR FOOT, COMPLETE (MIN 3 VIEWS), RIGHT (CPT=73630)   Final Result   PROCEDURE: XR FOOT, COMPLETE (MIN 3 VIEWS), RIGHT (CPT=73630)       COMPARISON: [facility], XR FOOT, COMPLETE (MIN 3 VIEWS),    RIGHT (CPT=73630), 8/12/2024, 8:33 AM.       INDICATIONS: Worsening reddness and pain to Rt forefoot post injury    08/12/24.       TECHNIQUE: Three views were obtained.         FINDINGS:    BONES: No acute appearing fracture or dislocation.  Moderately severe    degenerative narrowing of the right 1st metatarsophalangeal joint.  Small    calcaneal plantar enthesophyte.  Mild enthesopathy of the Achilles tendon    insertion.   SOFT TISSUES: Moderate dorsal soft tissue swelling of the midfoot.     Similar to prior.  No radiopaque foreign body.  No soft tissue gas.   EFFUSION: None visible.    OTHER: Negative.                    =====   CONCLUSION:        No acute fracture or osseous malalignment.       Nonspecific persistent swelling of the midfoot.  No radiopaque foreign    body.  Correlate with clinical assessment to evaluate for cellulitis.       Moderate to advanced first metatarsophalangeal joint osteoarthritis.               Dictated by (CST): Kim Hester MD on 8/21/2024 at 11:12 AM        Finalized by (CST): Kim Hester MD on 8/21/2024 at 11:13 AM                        MDM       Patient is a 78-year-old male with history above, presenting to immediate care for evaluation of worsening right foot pain and swelling with associated new redness and warmth right forefoot status post mechanical fall on 8/12/2024.  Was initially seen in our immediate care at that time and had x-ray imaging of right  ankle and foot negative for underlying fracture.  Had severe osteoarthritis of the first MTP and right ankle effusion consistent with ankle sprain and effusion.  Was given Ace wrap and postop shoe for comfort.  Has been taking over-the-counter pain medication and icing affected area with minimal relief.  No associated redness or warmth.  Does have past history of gout.  Unsure if related.  Redness and warmth and rash does extend to right forefoot.  Coming to immediate care for further evaluation.  Clinically exam concerning for underlying cellulitis.  Patient does have history of gout however redness and swelling is overlying forefoot.  There is no ulcerations lesions.  No drainage.  Laboratory results reassuring.  No leukocytosis no anemia.  Electrolytes with normal limits.  Normal renal function.  Further evaluation with ultrasound right lower extremity negative for deep venous thrombosis.  Patient does have trace right lower extremity pain and swelling.  Negative Homans' sign.  Does have underlying venous insufficiency and venous stasis dermatitis with varicose veins.  Repeat x-ray imaging right foot negative for occult fracture.  Persistent swelling right dorsal midfoot.  Given clinical presentation will clinically treat for nonpurulent cellulitis of right foot with oral antibiotics Augmentin 10-day course.  Pain management.  Leg elevation.  Compression stockings.  Postop shoe for comfort.  Follow-up primary and podiatry for definitive management/treatment.  ED return precautions      Medical Decision Making      Disposition and Plan     Clinical Impression:  1. Cellulitis of right foot    2. Varicose vein    3. Venous insufficiency of lower extremity    4. Venous stasis dermatitis of both lower extremities    5. Accidental fall, sequela    6. Personal history of gout    7. 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  Qty: 20 tablet, Refills: 0

## 2024-08-21 NOTE — TELEPHONE ENCOUNTER
Patient is scheduled for right foot/big toe injury. X-rays in Epic. Please advise if imaging is needed.  Future Appointments   Date Time Provider Department Center   8/30/2024 10:00 AM Silva Delarosa DPM [facility] [place]

## 2024-08-21 NOTE — TELEPHONE ENCOUNTER
Patient will be seen first.no new imaging is needed at this time. Imaging is in epic from 8.21.24. thanks

## 2024-08-21 NOTE — ED INITIAL ASSESSMENT (HPI)
Patient arrived ambulatory to room. Patient was seen at [place] on 8/12 for the same complaint. Patient fell after tripping on his daughters dog. +increased swelling to the right foot. +redness/swelling to the right great toe.

## 2024-08-25 NOTE — TELEPHONE ENCOUNTER
Problem: Vaginal Birth or  Section  Goal: Fetal and maternal status remain reassuring during the birth process  Description:  Birth OB-Pregnancy care plan goal which identifies if the fetal and maternal status remain reassuring during the birth process  Outcome: Adequate for Discharge      Dr. Beckie Olivera    Patient is on coumadin. The pre procedure PT/INR  due to reschedule. Please review and sign below pended order if you would like one done on arrival to endo since on coumadin.     Thank you    (I mailed prep instructions to home address and sent them to patients MyChart as requested)

## 2024-11-27 ENCOUNTER — HOSPITAL ENCOUNTER (OUTPATIENT)
Age: 79
Discharge: HOME | End: 2024-11-27
Payer: MEDICARE

## 2024-11-27 VITALS
OXYGEN SATURATION: 94 % | HEIGHT: 67 IN | TEMPERATURE: 98 F | BODY MASS INDEX: 34.21 KG/M2 | SYSTOLIC BLOOD PRESSURE: 143 MMHG | RESPIRATION RATE: 18 BRPM | HEART RATE: 80 BPM | WEIGHT: 218 LBS | DIASTOLIC BLOOD PRESSURE: 66 MMHG

## 2024-11-27 DIAGNOSIS — R42 DIZZINESS: Primary | ICD-10-CM

## 2024-11-27 LAB
#MXD IC: 0.5 X10ˆ3/UL (ref 0.1–1)
ATRIAL RATE: 68 BPM
BUN BLD-MCNC: 16 MG/DL (ref 7–18)
CHLORIDE BLD-SCNC: 99 MMOL/L (ref 98–112)
CO2 BLD-SCNC: 25 MMOL/L (ref 21–32)
CREAT BLD-MCNC: 1.3 MG/DL
EGFRCR SERPLBLD CKD-EPI 2021: 57 ML/MIN/1.73M2 (ref 60–?)
GLUCOSE BLD-MCNC: 95 MG/DL (ref 70–99)
HCT VFR BLD AUTO: 39.1 %
HCT VFR BLD CALC: 41 %
HGB BLD-MCNC: 12.5 G/DL
ISTAT IONIZED CALCIUM FOR CHEM 8: 1.15 MMOL/L (ref 1.12–1.32)
LYMPHOCYTES # BLD AUTO: 3 X10ˆ3/UL (ref 1–4)
LYMPHOCYTES NFR BLD AUTO: 40.5 %
MCH RBC QN AUTO: 29.1 PG (ref 26–34)
MCHC RBC AUTO-ENTMCNC: 32 G/DL (ref 31–37)
MCV RBC AUTO: 90.9 FL (ref 80–100)
MIXED CELL %: 7 %
NEUTROPHILS # BLD AUTO: 4 X10ˆ3/UL (ref 1.5–7.7)
NEUTROPHILS NFR BLD AUTO: 52.5 %
P AXIS: 60 DEGREES
P-R INTERVAL: 338 MS
PLATELET # BLD AUTO: 264 X10ˆ3/UL (ref 150–450)
POTASSIUM BLD-SCNC: 3.5 MMOL/L (ref 3.6–5.1)
Q-T INTERVAL: 472 MS
QRS DURATION: 132 MS
QTC CALCULATION (BEZET): 501 MS
R AXIS: -12 DEGREES
RBC # BLD AUTO: 4.3 X10ˆ6/UL
SODIUM BLD-SCNC: 139 MMOL/L (ref 136–145)
T AXIS: 31 DEGREES
TROPONIN I BLD-MCNC: <0.02 NG/ML
VENTRICULAR RATE: 68 BPM
WBC # BLD AUTO: 7.5 X10ˆ3/UL (ref 4–11)

## 2024-11-27 PROCEDURE — 84484 ASSAY OF TROPONIN QUANT: CPT | Performed by: PHYSICIAN ASSISTANT

## 2024-11-27 PROCEDURE — 80047 BASIC METABLC PNL IONIZED CA: CPT | Performed by: PHYSICIAN ASSISTANT

## 2024-11-27 PROCEDURE — 93000 ELECTROCARDIOGRAM COMPLETE: CPT | Performed by: PHYSICIAN ASSISTANT

## 2024-11-27 PROCEDURE — 99214 OFFICE O/P EST MOD 30 MIN: CPT | Performed by: PHYSICIAN ASSISTANT

## 2024-11-27 PROCEDURE — 85025 COMPLETE CBC W/AUTO DIFF WBC: CPT | Performed by: PHYSICIAN ASSISTANT

## 2024-11-27 NOTE — ED PROVIDER NOTES
Patient Seen in: [facility]      History     Chief Complaint   Patient presents with    Dizziness     Stated Complaint: Dizziness    Subjective:   HPI    79-year-old male with history of diabetes, paroxysmal A-fib, hypertension presenting to the immediate care for evaluation of an episode of dizziness which occurred several hrs ago. Pt was sitting in a waiting room at a car shop. Stood and started walking to get his car when he felt suddenly very dizziness like he was about to pass out. Pt sat down and the symptoms resolved after <10 minutes.  He called his doctor who recommended he be evaluated in the immediate care.  Patient first went to another immediate care where an EKG was done and the provider there suggested he needed to be seen in the ER.  Patient is asymptomatic at this time.  He denies any weakness, headache or dizziness at this time    Objective:     No pertinent past medical history.            No pertinent past surgical history.              No pertinent social history.            Review of Systems    Positive for stated complaint: Dizziness  Other systems are as noted in HPI.  Constitutional and vital signs reviewed.      All other systems reviewed and negative except as noted above.    Physical Exam     ED Triage Vitals [11/27/24 1405]   /66   Pulse 80   Resp 18   Temp 98.2 °F (36.8 °C)   Temp src    SpO2 94 %   O2 Device        Current Vitals:   Vital Signs  BP: 143/66  Pulse: 80  Resp: 18  Temp: 98.2 °F (36.8 °C)    Oxygen Therapy  SpO2: 94 %        Physical Exam  Vitals and nursing note reviewed.   Constitutional:       General: He is not in acute distress.  HENT:      Head: Normocephalic and atraumatic.      Right Ear: External ear normal.      Left Ear: External ear normal.      Nose: Nose normal.      Mouth/Throat:      Mouth: Mucous membranes are moist.   Eyes:      Extraocular Movements: Extraocular movements intact.      Pupils: Pupils are equal, round, and reactive to  light.   Cardiovascular:      Rate and Rhythm: Normal rate and regular rhythm.      Heart sounds: No murmur heard.  Pulmonary:      Effort: Pulmonary effort is normal.      Breath sounds: No wheezing.   Abdominal:      General: Abdomen is flat.   Musculoskeletal:         General: Normal range of motion.      Cervical back: Normal range of motion.   Skin:     General: Skin is warm.   Neurological:      General: No focal deficit present.      Mental Status: He is alert and oriented to person, place, and time.   Psychiatric:         Mood and Affect: Mood normal.         Behavior: Behavior normal.             ED Course     Labs Reviewed   POCT CBC - Abnormal; Notable for the following components:       Result Value    HGB IC 12.5 (*)     All other components within normal limits   POCT ISTAT CHEM8 CARTRIDGE - Abnormal; Notable for the following components:    ISTAT Potassium 3.5 (*)     eGFR-Cr 57 (*)     All other components within normal limits   ISTAT TROPONIN - Normal     EKG    Rate, intervals and axes as noted on EKG Report.  Rate: 68  Rhythm: Sinus Rhythm  Reading: Sinus rhythm with first-degree AV block.  Intraventricular block.  No ectopy.  No STEMI              79-year-old male presenting for evaluation after an episode of dizziness which occurred this morning.  Patient gives a history of standing and quickly walking to follow the .  He also notes he had not eaten breakfast but did not take his morning medicine.  His initial blood pressure at the outside immediate care was low 100s over 60s.  Blood pressure currently 143/66.  EKG is unchanged from previous and the patient is asymptomatic at this time  I suspect patient had a vasovagal incident due to not eating, quickly standing and walking.  He did not syncopized, did not fall or have any head injury with this incident    Ddx-vasovagal episode, hypotension, electrolyte abnormality, arrhythmia, infection    CBC, BMP reassuring and troponin is  negative.  Patient is comfortable with discharge to home, PCP follow-up.  We did review ER return precautions should he have any recurrent episodes of dizziness.  We also discussed standing safely before walking to decrease the risk of vasal vagal events.         MDM              Medical Decision Making      Disposition and Plan     Clinical Impression:  1. Dizziness         Disposition:  Discharge  11/27/2024  3:37 pm    Follow-up:  Simone Reyes MD  [address] [place] [address] [id number] [phone]                Medications Prescribed:  Discharge Medication List as of 11/27/2024  3:37 PM              Supplementary Documentation:

## 2024-11-27 NOTE — ED INITIAL ASSESSMENT (HPI)
Patient reports walking out the door from building earlier today, reports sudden onset of dizziness that lasted approximately 30 minutes. Patient reports going to a clinic where he received and EKG. Patient reports being upset from the clinic stating they didn't know what they were doing. Paperwork showed orthostatics from clinic.

## 2024-12-09 ENCOUNTER — TELEPHONE (OUTPATIENT)
Facility: CLINIC | Age: 79
End: 2024-12-09

## 2024-12-09 NOTE — TELEPHONE ENCOUNTER
Simone Martin MD   Physician  Gastroenterology     Operative Report     Signed     Date of Service: 12/1/2022 12:05 PM  Case Time: Procedures: Surgeons:   12/1/2022 11:32 AM COLONOSCOPY    Simone Martin MD               Signed         [facility] Endoscopy Report        Preoperative Diagnosis:  - colon cancer screening  - history of colon polyps        Postoperative Diagnosis:  - colon polyps x 7  - diverticulosis  - small internal hemorrhoids        Procedure:    Colonoscopy      Surgeon:  Simone Martin M.D.     Anesthesia:  MAC      Technique:  After informed consent, the patient was placed in the left lateral recumbent position.  Digital rectal examination revealed no palpable intraluminal abnormalities.  An Olympus variable stiffness 190 series HD colonoscope was inserted into the rectum and advanced under direct vision by following the lumen to the cecum.  The colon was examined upon withdrawal in the left lateral position.     The procedure was well tolerated without immediate complication.        Findings:  The preparation of the colon was good.  The terminal ileum was examined for 4 cm and visually normal.  The ileocecal valve was well preserved. The visualized colonic mucosa from the cecum to the anal verge was normal with an intact vascular pattern.     Colon polyps x7 removed as follows;  -Cecum x1, diminutive removed by cold forceps technique.  -Ascending x1, diminutive removed by cold forceps technique  -Transverse x5, each these polyps were sessile and ranged in size from 3 to 5 mm.  All were removed by cold snare technique.  Slight oozing was noted from one of the polypectomy sites and 2 colon clips were placed across the site with excellent hemostasis.  All polypectomy sites inspected and found to be free of bleeding and specimens retrieved and sent for analysis.     Diverticulosis located in the sigmoid colon with scattered more rare diverticula noted more proximally for  pandiverticulosis.     Small internal hemorrhoids noted.     Estimated blood loss-insignificant  Specimens-colon polyps        Impression:  - colon polyps x 7  - diverticulosis  - small internal hemorrhoids     Recommendations:  - Post polypectomy instructions given  - Repeat colonoscopy in 3 years  - High fiber diet for diverticular disease  - Symptomatic treatment of hemorrhoids  - Ok to restart coumadin, follow up with managing provider for input on dosing               Simone Martin MD  12/1/2022  12:05 PM                Simone Martin MD  12/2/2022  5:39 PM CST       I wanted to get back to you with your colonoscopy results.  You had 7 colon polyps removed which were benign.  I would advise a repeat colonoscopy in 3 years to make sure no new polyps are forming.  You also have internal hemorrhoids and diverticulosis.  Please stay on a high fiber diet and call with any questions.            Specimen to Pathology Tissue: [id number]  Order: [id number]   Collected 12/1/2022 11:38 AM       Status: Final result       Visible to patient: Yes (seen)       Dx: History of colon polyps    2 Result Notes       1 Patient Communication       1 Follow-up Encounter        Component  Ref Range & Units      Case Report     Surgical Pathology                                Case: [id number]                                     Authorizing Provider:  Simone Martin MD       Collected:           12/01/2022 11:38 AM             Ordering Location:     [facility]          Received:            12/01/2022 01:43 PM                                    Endoscopy Lab Suites                                                           Pathologist:           Sera West MD                                                           Specimens:   A) - Colon polyp, transverse x5                                                                        B) - Colon polyp, ascending x 1                                                                         C) - Colon polyp, Cecum x1                                                                    Final Diagnosis:        A. Transverse colon polyp x5:   Fragments of tubular adenoma.     B. Ascending colon polyp x1:   Colonic mucosa with focal hyperplastic change.     C. Cecal polyp x1:   Tubular adenoma.        Electronically signed by Sera West MD on 12/2/2022 at  9:45 AM        Clinical Information      Z86.010 History Of Colon Polyps.        Gross Description      Specimen A is labeled \"Hunt, transverse colon polyp x5\" received in formalin. The specimen consists of multiple fragments of pink-tan soft tissue measuring in aggregate 1.3 x 0.7 x 0.2 cm. Submitted in cassette A1.      Specimen B is labeled \"Hunt, ascending colon polyp x1\" received in formalin. The specimen consists of two fragments of pink-tan soft tissue measuring in aggregate 0.5 x 0.3 x 0.1 cm. Submitted in cassette B1.      Specimen C is labeled \"Hunt, cecal polyp x1\" received in formalin. The specimen consists of one fragment of pink-tan soft tissue measuring 0.2 cm in greatest dimension. Submitted in cassette C1. (jq)      Sera West M.D./[facility]      Interpretation     Benign     Electronically signed by Sera West MD on 12/2/2022 at  9:45 AM     [facility] ([facility])               Specimen Collected: 12/01/22 11:38 AM Last Resulted: 12/02/22  9:45 AM

## 2024-12-09 NOTE — TELEPHONE ENCOUNTER
Patient contacted, when he answered aware he isn't due for another whole year.  He rather go over the screening questions closer to when he is due.  Asked that we mail letter reminding him beforehand.  See 12/7/2024 encounter-it is set up to mail recall letter September 1, 2025 confirmed address on file is correct.

## 2025-02-13 NOTE — TELEPHONE ENCOUNTER
Patient notified. Pt transported to CCU by myself, EDT, and RRT. Pt A&OX4. VSS. NADN. Respirations nonlabored. Pt maintaining o2 sats on BiPAP. IV's patent and infusing meds per MAR. Belongings transported with pt. Dentures and necklace sent with pt's family.

## 2025-04-28 ENCOUNTER — TELEPHONE (OUTPATIENT)
Facility: CLINIC | Age: 80
End: 2025-04-28

## 2025-04-28 DIAGNOSIS — R10.30 LOWER ABDOMINAL PAIN, UNSPECIFIED: ICD-10-CM

## 2025-04-28 DIAGNOSIS — R19.7 DIARRHEA, UNSPECIFIED TYPE: Primary | ICD-10-CM

## 2025-04-28 NOTE — TELEPHONE ENCOUNTER
Dr. Martin    I spoke tor Arsalan  He said that he had diarrhea every day for 5 days.  He would have 1-2 episodes per day.  At the time he had urgency where he could barely make it to the bathroom in time.  He had no pain but his intestines were gurgling and making a lot of noise.  During that time he ate a BRAT diet and took an anti diarrheal daily.  He then did not use the restroom for several days and had a normal formed stool today.  He wants to know if he could eat a normal diet again.  He is asking if you think he may have had food poisoning because he did eat something old in his fridge.  He denies any recent antibiotic use.  He does have some rectal irritation stating he has an external hemorrhoid and thinks he has an internal hemorrhoid.  Discussed he could put a barrier cream or some preparation H on there to help with this.    I let him know unsure if it was food poisoning versus overflow diarrhea, but regardless fiber supplement would be helpful with his recent symptoms.  He does not want to start this without your input.    He is asking what he should eat or what he should do given recent diarrhea.    Thank you

## 2025-04-28 NOTE — TELEPHONE ENCOUNTER
Patient states he had diarrhea, took medication but now has not had a bowel movement. Patient asking to speak with a nurse for guidance.

## 2025-04-28 NOTE — TELEPHONE ENCOUNTER
Patient contacted and symptoms reviewed, he has had diarrhea for the last 5 days, he ate some hamburger from the fridge it smelled a little weird.  Symptoms began after that.  No blood per rectum, he is having multiple watery diarrheal bowel movements which are difficult to control.    He will go over to the lab and get a stool kit to see if we cannot define what is causing this, no recent antibiotic use but I did add a C. difficile on.

## 2025-04-29 ENCOUNTER — LAB ENCOUNTER (OUTPATIENT)
Dept: LAB | Facility: HOSPITAL | Age: 80
End: 2025-04-29
Attending: INTERNAL MEDICINE
Payer: MEDICARE

## 2025-04-29 DIAGNOSIS — R19.7 DIARRHEA, UNSPECIFIED TYPE: ICD-10-CM

## 2025-04-29 DIAGNOSIS — R10.30 LOWER ABDOMINAL PAIN, UNSPECIFIED: ICD-10-CM

## 2025-04-29 LAB
ADENOVIRUS F 40/41 PCR: NEGATIVE
ASTROVIRUS PCR: NEGATIVE
C CAYETANENSIS DNA SPEC QL NAA+PROBE: NEGATIVE
CAMPY SP DNA.DIARRHEA STL QL NAA+PROBE: NEGATIVE
CRYPTOSP DNA SPEC QL NAA+PROBE: NEGATIVE
EAEC PAA PLAS AGGR+AATA ST NAA+NON-PRB: NEGATIVE
EC STX1+STX2 + H7 FLIC SPEC NAA+PROBE: NEGATIVE
ENTAMOEBA HISTOLYTICA PCR: NEGATIVE
EPEC EAE GENE STL QL NAA+NON-PROBE: NEGATIVE
ETEC LTA+ST1A+ST1B TOX ST NAA+NON-PROBE: NEGATIVE
GIARDIA LAMBLIA PCR: NEGATIVE
NOROVIRUS GI/GII PCR: NEGATIVE
P SHIGELLOIDES DNA STL QL NAA+PROBE: NEGATIVE
ROTAVIRUS A PCR: NEGATIVE
SALMONELLA DNA SPEC QL NAA+PROBE: NEGATIVE
SAPOVIRUS PCR: NEGATIVE
SHIGELLA SP+EIEC IPAH ST NAA+NON-PROBE: NEGATIVE
V CHOLERAE DNA SPEC QL NAA+PROBE: NEGATIVE
VIBRIO DNA SPEC NAA+PROBE: NEGATIVE
YERSINIA DNA SPEC NAA+PROBE: NEGATIVE

## 2025-04-29 PROCEDURE — 87507 IADNA-DNA/RNA PROBE TQ 12-25: CPT

## 2025-04-29 PROCEDURE — 87493 C DIFF AMPLIFIED PROBE: CPT

## 2025-04-30 LAB — C DIFF TOX B STL QL: NEGATIVE

## 2025-05-01 ENCOUNTER — TELEPHONE (OUTPATIENT)
Facility: CLINIC | Age: 80
End: 2025-05-01

## 2025-05-09 ENCOUNTER — TELEPHONE (OUTPATIENT)
Facility: CLINIC | Age: 80
End: 2025-05-09

## 2025-05-09 DIAGNOSIS — R19.8 IRREGULAR BOWEL HABITS: Primary | ICD-10-CM

## 2025-05-09 NOTE — TELEPHONE ENCOUNTER
Dr. Martin    The diarrhea is back.  Describes it as one chunk that sinks to the bottom then little pieces.  Also states also has a lot of gurgling noises coming from abdomen.  He did the BRAT diet -watching what he eats but symptoms not improving.  He thinks that something is going on and wants further testing for this-sounds like he is interested in the flexible sigmoidoscopy that you had mentioned before  He does not have any bleeding but can feel the hemorrhoids-he has already tried things like preparation H and barrier creams.    He states he can't keep going on like this and wants to know what to do next.    Thank you

## 2025-05-09 NOTE — TELEPHONE ENCOUNTER
Patient is requesting to speak with Silva MCCRAY.  He states that RN mentioned a shorter procedure than a Colonoscopy.   Patient states that he feels that he may have a blockage or hemorrhoids.  Patient complains of diarrhea.  Please call

## 2025-05-12 NOTE — TELEPHONE ENCOUNTER
Patient contacted, he has had irregular bowel habits initially loose stools and now has had irregular stools small amounts which been difficult to expel.  Does have intermittent episodes of incontinence when he thinks he has to pass gas he can pass stool.    Denies any abdominal pain, no blood per rectum.      Stool testing was negative, possible stool buildup/overflow diarrhea and constipation/fecal impaction.  Plan to check an x-ray to see if there is any stool buildup.  Will have him follow-up in the office.    Nursing staff to overbook him in office for next week which will be May 21.

## 2025-05-13 ENCOUNTER — HOSPITAL ENCOUNTER (OUTPATIENT)
Dept: GENERAL RADIOLOGY | Facility: HOSPITAL | Age: 80
Discharge: HOME | End: 2025-05-13
Attending: INTERNAL MEDICINE
Payer: MEDICARE

## 2025-05-13 DIAGNOSIS — R19.8 IRREGULAR BOWEL HABITS: ICD-10-CM

## 2025-05-13 PROCEDURE — 74018 RADEX ABDOMEN 1 VIEW: CPT | Performed by: INTERNAL MEDICINE

## 2025-05-13 NOTE — TELEPHONE ENCOUNTER
Patient contacted and appointment made for 05/21/2025 at 1:00 pm with Dr. Martin at [facility].  Patient voiced understanding.

## 2025-05-14 ENCOUNTER — TELEPHONE (OUTPATIENT)
Facility: CLINIC | Age: 80
End: 2025-05-14

## 2025-05-15 NOTE — TELEPHONE ENCOUNTER
Spoke to patient wanted to make sure he did e check in before appointment- let him know we cannot see that offered to transfer to  to check, patient declined.

## 2025-05-19 ENCOUNTER — TELEPHONE (OUTPATIENT)
Facility: CLINIC | Age: 80
End: 2025-05-19

## 2025-05-19 NOTE — TELEPHONE ENCOUNTER
I called the patient and apologized  I let him know that we were told it is taking about 7 business days for routine imaging to be read by the radiologist right now.  I let him know I would call to expedite it to ensure it is available for his Wednesday appointment.  I spoke to Mirta with radiology and was told we would get the reading back today.

## 2025-05-21 ENCOUNTER — TELEPHONE (OUTPATIENT)
Facility: CLINIC | Age: 80
End: 2025-05-21

## 2025-05-21 ENCOUNTER — OFFICE VISIT (OUTPATIENT)
Facility: CLINIC | Age: 80
End: 2025-05-21
Payer: MEDICARE

## 2025-05-21 VITALS
HEIGHT: 67 IN | SYSTOLIC BLOOD PRESSURE: 110 MMHG | DIASTOLIC BLOOD PRESSURE: 57 MMHG | WEIGHT: 218 LBS | BODY MASS INDEX: 34.21 KG/M2 | HEART RATE: 59 BPM

## 2025-05-21 DIAGNOSIS — Z86.0100 HISTORY OF COLON POLYPS: ICD-10-CM

## 2025-05-21 DIAGNOSIS — Z12.11 COLON CANCER SCREENING: ICD-10-CM

## 2025-05-21 DIAGNOSIS — Z86.0100 HX OF COLONIC POLYPS: ICD-10-CM

## 2025-05-21 DIAGNOSIS — R19.8 IRREGULAR BOWEL HABITS: Primary | ICD-10-CM

## 2025-05-21 DIAGNOSIS — K59.00 CONSTIPATION, UNSPECIFIED CONSTIPATION TYPE: ICD-10-CM

## 2025-05-21 DIAGNOSIS — Z12.11 SCREENING FOR COLON CANCER: ICD-10-CM

## 2025-05-21 PROCEDURE — 99214 OFFICE O/P EST MOD 30 MIN: CPT | Performed by: INTERNAL MEDICINE

## 2025-05-21 RX ORDER — HYDROCORTISONE 25 MG/G
1 CREAM TOPICAL 2 TIMES DAILY
Qty: 28 G | Refills: 0 | Status: SHIPPED | OUTPATIENT
Start: 2025-05-21

## 2025-05-21 NOTE — PATIENT INSTRUCTIONS
Irregular bowel habits  - constipation noted on Xray  - drink bowel prep 1/2 to cleanse out colon   - colonoscopy with Golytely prep and MAC   - hold Xaralto 2 days before and metformin day before procedure

## 2025-05-21 NOTE — TELEPHONE ENCOUNTER
He sees Dr. Wilfred Bacon per [facility]  He was recently seen on 1/26/25 by their office  Request faxed there  Remind me encounter set up for follow up closer to date of procedure.

## 2025-05-21 NOTE — TELEPHONE ENCOUNTER
GI RN s,     Patient is scheduled for Colonoscopy Procedure on 9/2/2025. Patient is on Blood thinners and needs to hold it for 2 days prior to the procedure. Please contact prescriber to get authorization. [facility]'s [facility].    Thank you!

## 2025-05-21 NOTE — TELEPHONE ENCOUNTER
Scheduled for:  Colonoscopy [address]  Provider Name:  Dr Martin  Date:  9/2/2025  Location:  [facility]  Sedation:  MAC  Time:  8:55 am. (pt is aware that ENDO will call the day before the procedure to confirm arrival time)  Prep:  Golytely  Meds/Allergies Reconciled?:  Physician Reviewed  Diagnosis with codes:    Irregular bowel habits R19.8  Hx of colon polyps Z86.010  CRC screening Z12.11  Was patient informed to call insurance with codes (Y/N):  Yes  Referral sent?:  Referral was sent at the time of electronic surgical scheduling.  [facility] or [facility] notified?:  I sent an electronic request to Endo Scheduling and received a confirmation today.  Medication Orders:  Patient is aware to NOT take iron pills, herbal meds and diet supplements for 2 weeks before exam.   Hold Vitamins for 2 weeks prior to the procedure.  Hold Apixaban for 2 days prior to th eprocedure.  Hold Metformin the day prior and the day of the procedure.  Also to NOT take any form of alcohol, recreational drugs and any forms of ED meds 24-72 hours before exam.   Misc Orders:  GI RN to contact blood thinner prescriber.   Further instructions given by staff:  I discussed the prep instructions with the patient which he verbally understood. I provided patient with prep instruction's sheet in office.      Patient was informed about the new cancellation policy for his/her procedure. Patient was also given a copy of the cancellation policy at the time of the appointment and verbalized understanding.

## 2025-05-21 NOTE — PROGRESS NOTES
Arsalan Tomlinson is a 79 year old male.    HPI:     Chief Complaint   Patient presents with    Constipation    Diarrhea   The patient is 79-year-old male who has a history of diabetes, reflux, prostate cancer, sleep apnea, atrial fibrillation on anticoagulation who is seen today in the office for irregular bowel habits.    Arsalan reports bowel irregularity with episodes of constipation alternating with describes as looser stool.  He denies any blood in the stool.  He does have occasional mushy stools but does pass small chunks of stool on a fairly regular basis.  He has been taking fiber which he discontinued as he was not sure if this was helping or not.  No other new medications.  Stool testing for C. difficile was negative as well as enteric organisms.  X-ray of the abdomen did show constipation stool throughout the colon.    The patient does have hemorrhoids and occasionally gets irritated in the perianal area.    His prior colonoscopy was in 2022 and 7 polyps removed and he was advised to repeat colonoscopy 2025.    HISTORY:  Past Medical History[1]   Past Surgical History[2]   Family History[3]   Social History: Short Social Hx on File[4]     Medications (Active prior to today's visit):  Current Medications[5]    Allergies:  Allergies[6]      ROS:   The patient denies any chest pain or shortness of breath,  No neurologic or dermatologic symptoms.     PHYSICAL EXAM:   Blood pressure 110/57, pulse 59, height 5' 7\" (1.702 m), weight 218 lb (98.9 kg).    The patient appears their stated age and is in no acute distress  HEENT- anicteric sclera, neck no lymphadnopathy, OP- clear with no masses or lesions  Chest- Clear bilaterally, no wheezing,  Heart- regular rate, no murmur or gallop  Abdomen- Soft and nontender, nondistended  Ext- no clubbing or cyanosis  Skin- no rashes or lesions  Neuro- appropriate response, alert, no confusion  .  ASSESSMENT/PLAN:   Assessment     Arsalan has a history of irregular bowel habits, he had  previously been on fiber but discontinued this on his own.  He sounds that he has now more constipation issues with intermittent episodes of incontinence, urgency and x-ray confirming findings of stool backup.  Discussed bowel cleanse and he will drink one half of a GoLytely bowel preparation.  He is also due for colonoscopy and we will set this up through the office for late summer.  Risks and benefits reviewed and he agrees to proceed and we need to hold medication such as metformin as well as Xarelto as per instructed.    Plan  Irregular bowel habits  - constipation noted on Xray  - drink bowel prep 1/2 to cleanse out colon   - colonoscopy with Golytely prep and MAC   - hold Xaralto 2 days before and metformin day before procedure    Colonoscopy consent: I have discussed the risks, benefits, and alternatives to colonoscopy with the patient [who demonstrated understanding], including but not limited to the risks of bleeding, infection, pain, death, as well as the risks of anesthesia and perforation all leading to prolonged hospitalization, surgical intervention, or even death. I also specifically mentioned the miss rate of colonoscopy of 5-10% in the best of all circumstances. All questions were answered to the patient’s satisfaction. The patient signed informed consent and elected to proceed with colonoscopy with intervention [i.e. polypectomy, stent placement, etc.] as indicated.         Orders This Visit:  No orders of the defined types were placed in this encounter.      Meds This Visit:  Requested Prescriptions      No prescriptions requested or ordered in this encounter       Imaging & Referrals:  None       Simone Martin MD  [facility]              [1]   Past Medical History:   A-fib (HCC)    Anxiety    Arrhythmia    AFIB    B12 deficiency    Back problem    BPH (benign prostatic hyperplasia)    Cholecystitis    Chronic atrial fibrillation (HCC)    Colon polyp    Depression     Diabetes (HCC)    Esophageal reflux    Essential hypertension    Exposure to medical diagnostic radiation    Hepatic steatosis    High blood pressure    High cholesterol    Hyperlipidemia    Osteoarthritis    Personal history of antineoplastic chemotherapy    Prostate cancer (HCC)    Rotator cuff disorder    Sleep apnea    using CPAP at night    Visual impairment    Wears eyeglasses   [2]   Past Surgical History:  Procedure Laterality Date    Ablation  2011    cardiac ablation    Cholecystectomy  2009    Colonoscopy  2011    Colonoscopy N/A 2016    Procedure: COLONOSCOPY;  Surgeon: Bonilla Alonso MD;  Location: [facility] ENDOSCOPY    Colonoscopy N/A 2022    Procedure: COLONOSCOPY;  Surgeon: Simone Martin MD;  Location: [facility] ENDOSCOPY    Knee replacement surgery      Laparoscopy, surgical prostatectomy, retropubic radical, w/nerve sparing      Other surgical history  2001    rotator cuff    Other surgical history N/A     Uvulopalatopharyngoplasty    Prostate surgery N/A     PROSTATE REMOVAL    Radiation N/A    [3]   Family History  Problem Relation Age of Onset    Breast Cancer Mother     Heart Disease Mother         CAD    Heart Disease Father         No h/o heart disease   [4]   Social History  Socioeconomic History    Marital status:    Tobacco Use    Smoking status: Former     Current packs/day: 0.00     Average packs/day: 1 pack/day for 25.0 years (25.0 ttl pk-yrs)     Types: Cigarettes     Start date: 1953     Quit date: 1978     Years since quittin.4    Smokeless tobacco: Never   Vaping Use    Vaping status: Never Used   Substance and Sexual Activity    Alcohol use: Never     Comment: very rare    Drug use: Never   Other Topics Concern    Caffeine Concern No    Sleep Concern No    Exercise No   Social History Narrative    The patient does not use an assistive device..      The patient does not live in a home with stairs.   [5]   Current  Outpatient Medications   Medication Sig Dispense Refill    apixaban 5 MG Oral Tab Take 1 tablet (5 mg total) by mouth.      traZODone 100 MG Oral Tab Take 100 mg by mouth nightly.      acetaminophen 500 MG Oral Tab Take 1 tablet (500 mg total) by mouth every 6 (six) hours as needed for Pain.      furosemide 40 MG Oral Tab Take 1 tablet (40 mg total) by mouth every morning. 90 tablet 3    colchicine 0.6 MG Oral Tab Take 2 tabs initially. Wait one hour and take one more tab. 3 tablet 0    Vitamin D, Cholecalciferol, 25 MCG (1000 UT) Oral Cap Take 1 capsule by mouth daily.      metFORMIN HCl 1000 MG Oral Tab Take 1 tablet (1,000 mg total) by mouth 2 (two) times daily with meals.      Metoprolol Succinate ER (TOPROL XL) 50 MG Oral Tablet 24 Hr Take 1 tablet (50 mg total) by mouth 2 (two) times daily.      PARoxetine HCl 40 MG Oral Tab Take 1 tablet (40 mg total) by mouth at bedtime. Pt takes 1/2 tab ( total of 20mg) at night      Flecainide Acetate (TAMBOCOR) 100 MG Oral Tab 1 tablet (100 mg total) 2 (two) times daily.  11    MULTIVITAMINS OR 1 TABLET DAILY      pravastatin 40 MG Oral Tab Take 1 tablet (40 mg total) by mouth daily. (Patient not taking: Reported on 5/21/2025) 90 tablet 3   [6]   Allergies  Allergen Reactions    Morphine Sulfate UNKNOWN     \"BLOOD PRESSURE BOTTOMED OUT\"    Amoxicillin-Pot Clavulanate DIARRHEA     severe

## 2025-05-22 ENCOUNTER — TELEPHONE (OUTPATIENT)
Facility: CLINIC | Age: 80
End: 2025-05-22

## 2025-05-22 NOTE — TELEPHONE ENCOUNTER
Dr. Veronica HOLLIDAY    Patient did the 1/2 bottle PEG washout today.  He had liquid brown output, stools are liquid with some residue.  I reviewed to make sure he does ok with some liquids, then light dinner, then can eat what he normally eats.  He is wondering how to know if it helped.  He is going to call me tomorrow afternoon with an update on how he is doing since will be eating a few meals between now and then.    Thank you

## 2025-05-22 NOTE — TELEPHONE ENCOUNTER
Patient is requesting to speak with an RN.  Patient states he has questions regarding yesterday's appointment.   He is unsure if he is able to start eating.  Please call

## 2025-05-22 NOTE — TELEPHONE ENCOUNTER
Ok noted, sounds like the first part of the process worked, the clean out.   We now need to see if the colon functions better.   Agree with restarting diet and monitoring.

## 2025-05-23 NOTE — TELEPHONE ENCOUNTER
Dr. Veronica HOLLIDAY    I spoke to Arsalan  He said that everything ended up coming out clear, the washout completely cleaned him out.  He had some eggs and potatoes so far and is doing well.  I told him after taking a prep it can take about 2-3 days to start having bowel movements again.  I told him if any bloating, pain, abdominal distention, or any return of symptoms/constipation to call back and let me know right away.  He is going to have a more significant meal than just the eggs and mashed potatoes today and see how that goes.    Thank you

## 2025-08-11 ENCOUNTER — TELEPHONE (OUTPATIENT)
Facility: CLINIC | Age: 80
End: 2025-08-11

## 2025-08-22 ENCOUNTER — TELEPHONE (OUTPATIENT)
Facility: CLINIC | Age: 80
End: 2025-08-22

## (undated) DIAGNOSIS — M16.11 PRIMARY OSTEOARTHRITIS OF RIGHT HIP: Primary | ICD-10-CM

## (undated) DIAGNOSIS — I48.0 PAROXYSMAL ATRIAL FIBRILLATION (HCC): Primary | ICD-10-CM

## (undated) DIAGNOSIS — Z86.010 PERSONAL HISTORY OF COLONIC POLYPS: Primary | ICD-10-CM

## (undated) DEVICE — SNARE OPTMZ PLPCTM TRP

## (undated) DEVICE — DEV STRATAFIX PDS + SUTR 0 CTX

## (undated) DEVICE — SUT MONOCRYL 3-0 PS-2 Y497G

## (undated) DEVICE — DRAPE SHEET LARGE 76X55

## (undated) DEVICE — FORCEP RADIAL JAW 4

## (undated) DEVICE — CERAMIC HEAD UPCHARGE: Type: IMPLANTABLE DEVICE

## (undated) DEVICE — 35 ML SYRINGE REGULAR TIP: Brand: MONOJECT

## (undated) DEVICE — Device: Brand: STABLECUT®

## (undated) DEVICE — DRAPE PACK CHEST & U BAR

## (undated) DEVICE — SOL  .9 1000ML BAG

## (undated) DEVICE — DEV STRATAFIX MNCRL + SUTR 2-0

## (undated) DEVICE — 450 ML BOTTLE OF 0.05% CHLORHEXIDINE GLUCONATE IN 99.95% STERILE WATER FOR IRRIGATION, USP AND APPLICATOR.: Brand: IRRISEPT ANTIMICROBIAL WOUND LAVAGE

## (undated) DEVICE — WOUND RETRACTOR AND PROTECTOR: Brand: ALEXIS O WOUND PROTECTOR-RETRACTOR

## (undated) DEVICE — ANTERIOR HIP: Brand: MEDLINE INDUSTRIES, INC.

## (undated) DEVICE — KIT CLEAN ENDOKIT 1.1OZ GOWNX2

## (undated) DEVICE — CLIP LGT 11MM OPEN 2.8MM 235CM

## (undated) DEVICE — VIOLET BRAIDED (POLYGLACTIN 910), SYNTHETIC ABSORBABLE SUTURE: Brand: COATED VICRYL

## (undated) DEVICE — SPINOCAN® 18 GA. X 3-1/2 IN. (90 MM) SPINAL NEEDLE: Brand: SPINOCAN®

## (undated) DEVICE — GAUZE SPONGES,12 PLY: Brand: CURITY

## (undated) DEVICE — SNARE ENDOSCOPIC 10MM ROUND

## (undated) DEVICE — GOWN SURG AERO BLUE PERF XLG

## (undated) DEVICE — GOWN SURGICAL MICROCOOL XL LNG

## (undated) DEVICE — 1010 S-DRAPE TOWEL DRAPE 10/BX: Brand: STERI-DRAPE™

## (undated) DEVICE — SUT DEV STRATA 1 CTX SXPP1A400

## (undated) DEVICE — DRAPE SRG 26X15IN UTL TPE STRL

## (undated) DEVICE — PEN 6" SURGICAL MARKING PURPL

## (undated) DEVICE — APPLICATOR CHLORAPREP 26ML

## (undated) DEVICE — SYRINGE 35ML LL TIP

## (undated) DEVICE — DRESSING AQUACEL AG SP 3.5X10

## (undated) DEVICE — KIT ENDO ORCAPOD 160/180/190

## (undated) DEVICE — ILLU EXAM VERSALIGHT 30D 13IN

## (undated) DEVICE — TOTAL HIP W/POR CUP & COCR HD: Type: IMPLANTABLE DEVICE

## (undated) DEVICE — SOLUTION  .9 1000ML BTL

## (undated) DEVICE — SUT PDS PLUS 0 CT-2 SXPP1A407

## (undated) DEVICE — SOLUTION  .9 3000ML

## (undated) DEVICE — 3M™ STERI-STRIP™ REINFORCED ADHESIVE SKIN CLOSURES, R1547, 1/2 IN X 4 IN (12 MM X 100 MM), 6 STRIPS/ENVELOPE: Brand: 3M™ STERI-STRIP™

## (undated) DEVICE — SUT VICRYL 0 CT-1 J340H

## (undated) DEVICE — MEDI-VAC NON-CONDUCTIVE SUCTION TUBING 6MM X 1.8M (6FT.) L: Brand: CARDINAL HEALTH

## (undated) DEVICE — HOOD: Brand: FLYTE

## (undated) DEVICE — BIPOLAR SEALER 23-112-1 AQM 6.0: Brand: AQUAMANTYS™

## (undated) DEVICE — LINE MNTR ADLT SET O2 INTMD

## (undated) DEVICE — GAMMEX® PI HYBRID SIZE 9, STERILE POWDER-FREE SURGICAL GLOVE, POLYISOPRENE AND NEOPRENE BLEND: Brand: GAMMEX

## (undated) DEVICE — HANDPIECE SET WITH HIGH FLOW TIP AND SUCTION TUBE: Brand: INTERPULSE

## (undated) NOTE — ED AVS SNAPSHOT
[facility] Emergency Department    [address] [place] [address]    Phone:  [phone]    Fax:  [address]   MRN: [id number]    Department:  [facility] Emergency Department   Date of Visit:  6/5/2017 Follow the directions for taking your medications provided by your doctor. Please ask your health care provider, pharmacist or nurse if you have any questions regarding your home medications, including potential side effects.               Medication List You were examined and treated today on an urgent basis only. This was not a substitute for ongoing medical care. Often, one Emergency Department visit does not uncover every injury or illness.  If you have been referred to a primary care or a specialist ph [place] [address] [facility] ([address]) [address] [facility] [address]) [address] [place] [address]) [phone]   [facility] [employer]  ([address] [facility]) 71 Call [phone] for help. [employer] is NOT to be used for urgent needs. For medical emergencies, dial 911.

## (undated) NOTE — LETTER
10/10/2023      Bernardino Conn MD  Physical Medicine and Rehabilitation  [address] [facility], [address] [place] [address]  Dept: [phone]  Dept Fax: [phone]        RE: Consultation for Selam Huber        Dear Carlos Rodriguez MD,    Thank you very much for the opportunity to see your patient. Attached please find a summary from your patient's recent visit. I appreciate the chance to take care of your patient with you. Please feel free to call me with any questions or concerns. Sincerely,        Carlos Eduardo Mendoza.  Mili Conn MD  Electronically Signed on 10/10/2023

## (undated) NOTE — LETTER
2/1/2024      Denis Goldman MD  Physical Medicine and Rehabilitation  [address] [place] [address], [address]  [facility] [address]  Dept: [phone]  Dept Fax: [phone]        RE: Consultation for Arsalan Tomlinson        Dear Simone Duggan MD,    Thank you very much for the opportunity to see your patient.  Attached please find a summary from your patient's recent visit.     I appreciate the chance to take care of your patient with you.  Please feel free to call me with any questions or concerns.    Sincerely,        Denis Goldman MD  Electronically Signed on 2/1/2024

## (undated) NOTE — ED AVS SNAPSHOT
[facility] Emergency Department    [address]     [address] [place] [address]    Phone:  [phone]    Fax:  [address] [facility]   MRN: [id number]    Department:  [facility] Emergency Department   Date of Visit:  6/5/2017 and Class Registration line at [phone] or find a doctor online by visiting [email or web address].    IF THERE IS ANY CHANGE OR WORSENING OF YOUR CONDITION, CALL YOUR PRIMARY CARE PHYSICIAN AT ONCE OR RETURN IMMEDIATELY TO [address] [place] [address].     If

## (undated) NOTE — LETTER
[address]  Authorization for Invasive Procedure                                                                                           1. I hereby authorize Madeline Mcfarlane MD, my physician and his/her assistants (if applicable), which may include medical students, residents, and/or fellows, to perform the following surgical operation/ procedure and administer such anesthesia as may be determined necessary by my physician: Operation/Procedure name (s) COLONOSCOPY on [address]   2. I recognize that during the surgical operation/procedure, unforeseen conditions may necessitate additional or different procedures than those listed above. I, therefore, further authorize and request that the above-named surgeon, assistants, or designees perform such procedures as are, in their judgment, necessary and desirable. 3.   My surgeon/physician has discussed prior to my surgery the potential benefits, risks and side effects of this procedure; the likelihood of achieving goals; and potential problems that might occur during recuperation. They also discussed reasonable alternatives to the procedure, including risks, benefits, and side effects related to the alternatives and risks related to not receiving this procedure. I have had all my questions answered and I acknowledge that no guarantee has been made as to the result that may be obtained. 4.   Should the need arise during my operation/procedure, which includes change of level of care prior to discharge, I also consent to the administration of blood and/or blood products. Further, I understand that despite careful testing and screening of blood or blood products by collecting agencies, I may still be subject to ill effects as a result of receiving a blood transfusion and/or blood products.   The following are some, but not all, of the potential risks that can occur: fever and allergic reactions, hemolytic reactions, transmission of diseases such as Hepatitis, AIDS and Cytomegalovirus (CMV) and fluid overload. In the event that I wish to have an autologous transfusion of my own blood, or a directed donor transfusion, I will discuss this with my physician. Check only if Refusing Blood or Blood Products  I understand refusal of blood or blood products as deemed necessary by my physician may have serious consequences to my condition to include possible death. I hereby assume responsibility for my refusal and release the hospital, its personnel, and my physicians from any responsibility for the consequences of my refusal.    o  Refuse   5. I authorize the use of any specimen, organs, tissues, body parts or foreign objects that may be removed from my body during the operation/procedure for diagnosis, research or teaching purposes and their subsequent disposal by hospital authorities. I also authorize the release of specimen test results and/or written reports to my treating physician on the hospital medical staff or other referring or consulting physicians involved in my care, at the discretion of the Pathologist or my treating physician. 6.   I consent to the photographing or videotaping of the operations or procedures to be performed, including appropriate portions of my body for medical, scientific, or educational purposes, provided my identity is not revealed by the pictures or by descriptive texts accompanying them. If the procedure has been photographed/videotaped, the surgeon will obtain the original picture, image, videotape or CD. The hospital will not be responsible for storage, release or maintenance of the picture, image, tape or CD.    7.   I consent to the presence of a  or observers in the operating room as deemed necessary by my physician or their designees.     8.   I recognize that in the event my procedure results in extended X-Ray/fluoroscopy time, I may develop a skin reaction. 9. If I have a Do Not Attempt Resuscitation (DNAR) order in place, that status will be suspended while in the operating room, procedural suite, and during the recovery period unless otherwise explicitly stated by me (or a person authorized to consent on my behalf). The surgeon or my attending physician will determine when the applicable recovery period ends for purposes of reinstating the DNAR order. 10. Patients having a sterilization procedure: I understand that if the procedure is successful the results will be permanent and it will therefore be impossible for me to inseminate, conceive, or bear children. I also understand that the procedure is intended to result in sterility, although the result has not been guaranteed. 11. I acknowledge that my physician has explained sedation/analgesia administration to me including the risk and benefits I consent to the administration of sedation/analgesia as may be necessary or desirable in the judgment of my physician. I CERTIFY THAT I HAVE READ AND FULLY UNDERSTAND THE ABOVE CONSENT TO OPERATION and/or OTHER PROCEDURE.     _________________________________________ _________________________________     ___________________________________  Signature of Patient     Signature of Responsible Person                   Printed Name of Responsible Person                              _________________________________________ ______________________________        ___________________________________  Signature of Witness         Date  Time         Relationship to Patient    STATEMENT OF PHYSICIAN My signature below affirms that prior to the time of the procedure; I have explained to the patient and/or his/her legal representative, the risks and benefits involved in the proposed treatment and any reasonable alternative to the proposed treatment.  I have also explained the risks and benefits involved in refusal of the proposed treatment and alternatives to the proposed treatment and have answered the patient's questions.  If I have a significant financial interest in a co-management agreement or a significant financial interest in any product or implant, or other significant relationship used in this procedure/surgery, I have disclosed this and had a discussion with my patient.     _______________________________________________________________ _____________________________  Beulah Coke of Physician)                                                                                         (Date)                                   (Time)  Patient Name: Paulo Garcia    : 10/6/1945   Printed: 2022      Medical Record #: [id number]                                              Page 1 of

## (undated) NOTE — LETTER
JENNA Notifier: [employer]   BRANDON. Patient Name: Tona Stoddard. Identification Number: [id number]      Advance Beneficiary Notice of Noncoverage (ABN)  NOTE:  If Medicare doesn’t pay for D. Item/service(s) below, you may have to pay.   Medicare does n deductibles. ? OPTION 2. I want the D. Item/service(s) listed above, but do not bill Medicare. You may ask to be paid now as I am responsible for payment. I cannot appeal if Medicare is not billed. ? OPTION 3. I don’t want the D.  Item/service(s) listed

## (undated) NOTE — LETTER
AUTHORIZATION FOR SURGICAL OPERATION OR OTHER PROCEDURE    1.  I hereby authorize Dr. Frances Gamez and the [facility] staff assigned to my case to perform the following operation and/or procedure at the [facility] Office:    Right hip injection under ultrasound guidance Physician  My signature below affirms that prior to the time of the procedure, I have explained to the patient and/or his/her guardian, the risks and benefits involved in the proposed treatment and any reasonable alternative to the proposed treatment.   I h

## (undated) NOTE — LETTER
21        To Whom It May Concern:      Betsy Rochelle  :  10/6/1945    []  Patient has been cleared to hold Warfarin and Aspirin 81mg 3-5 days prior to Bilateral L4 Transforaminal Epidural Steroid Injections.   Holding the medication(s) may put the pa

## (undated) NOTE — LETTER
[facility] SURGERY SCHEDULING FORM   [address] [address], [place]   [phone] (scheduling phone) [phone] (scheduling fax)     PATIENT INFORMATION   Last Name:      Mami Tyler Name:    Zainab Anne Allergies: Morphine Sulfate     Patient on Warfarin and Metformin       Completed by:    Tami Cid      Date:    12/12/2018

## (undated) NOTE — ED AVS SNAPSHOT
Dante Keane   MRN: [id number]    Department:  [facility] Emergency Department   Date of Visit:  8/5/2017           Disclosure     Insurance plans vary and the physician(s) referred by the ER may not be covered by your plan.  Please contact your CARE PHYSICIAN AT ONCE OR RETURN IMMEDIATELY TO THE EMERGENCY DEPARTMENT. If you have been prescribed any medication(s), please fill your prescription right away and begin taking the medication(s) as directed.   If you believe that any of the medications

## (undated) NOTE — LETTER
8/10/2023      [place] Linda Strickland MD  Physical Medicine and Rehabilitation  [address] [facility], [address] [place] [address]  Dept: [phone]  Dept Fax: [phone]        RE: Consultation for Rupesh Mcgee        Dear Tess Ibanez MD,    Thank you very much for the opportunity to see your patient. Attached please find a summary from your patient's recent visit. I appreciate the chance to take care of your patient with you. Please feel free to call me with any questions or concerns. Sincerely,        Leticia Ellis.  Ronal Strickland MD  Electronically Signed on 8/10/2023

## (undated) NOTE — LETTER
10/12/23        To Whom It May Concern:      Milton Hernández  :  10/6/1945    Ashley Tirado is recommending Transforaminal Epidural Steroid Injections for the above patient. Patient is currently scheduled for this injection on 10/20/2023. Ashley Tirado is requesting your approval to proceed with the above injection. Patient is prescribed Coumadin 5 mg daily, and will need to hold this for 5 days and have INR less than or equal to 1.4. []  Patient has been cleared to hold Coumadin 5 mg x 5 days for procedure. Holding the medication(s) may put the patient at an increased risk for stroke, heart attack, or neurologic or cardiac events. []  Patient has NOT been cleared to hold Coumadin mg x 5 days  for procedure. X_________________________________________  (Provider signature)                                     (Date)      Please make a selection, sign and fax this letter back to our office    If this office may be of further assistance, please do not hesitate to contact us. Sincerely,    Tabatha Cavazos.  Mckenna Aguilera MD    Phone #: [phone]  Fax #: [phone]

## (undated) NOTE — LETTER
08/15/23        To Whom It May Concern: Dr Allison Hood has to do a Lumbar synovial cyst aspiration and right L5 and S1 Transforaminal Epidural Steroid Injection and needs clearance to hold the below medications. Dania Garcia  :  10/6/1945    []  Patient has been cleared to hold Aspirin 81 mg and Warfarin 5mg for procedure. Holding the medication(s) may put the patient at an increased risk for stroke, heart attack, or neurologic or cardiac events. []  Patient has NOT been cleared to hold Aspirin 81 mg and Warfarin 5mg for procedure. X_________________________________________  (Provider signature)                                     (Date)      Please make a selection, sign and fax this letter back to our office    If this office may be of further assistance, please do not hesitate to contact us. Sincerely,    Elias Ramirez.  Jose Cabrera MD    Phone #: [phone]  Fax #: [phone]

## (undated) NOTE — LETTER
AUTHORIZATION FOR SURGICAL OPERATION OR OTHER PROCEDURE    1. I hereby authorize Dr. Kourtney Lala and the [facility] staff assigned to my case to perform the following operation and/or procedure at the [facility]:    Right Hip Lidocaine injection under ultrasound guidance    2. My physician has explained the nature and purpose of the operation or other procedure, possible alternative methods of treatment, the risks involved, and the possibility of complication to me. I acknowledge that no guarantee has been made as to the result that may be obtained. 3.  I recognize that, during the course of this operation, or other procedure, unforseen conditions may necessitate additional or different procedure than those listed above. I, therefore, further authorize and request that the above named physician, his/her physician assistants or designees perform such procedures as are, in his/her professional opinion, necessary and desirable. 4.  Any tissue or organs removed in the operation or other procedure may be disposed of by and at the discretion of the [facility] staff and [facility]. 5.  I understand that in the event of a medical emergency, I will be transported by local paramedics to [facility] or other hospital emergency department. 6.  I certify that I have read and fully understand the above consent to operation and/or other procedure. 7.  I acknowledge that my physician has explained sedation/analgesia administration to me including the risks and benefits. I consent to the administration of sedation/analgesia as may be necessary or desirable in the judgement of my physician. Witness signature: ___________________________________________________ Date:  ______/______/_____                    Time:  ________ A. M.  P.M.        Patient Name:  Matthew Stanford  [birth year]  [id number]       Patient signature:  ___________________________________________________               Statement of Physician  My signature below affirms that prior to the time of the procedure, I have explained to the patient and/or his/her guardian, the risks and benefits involved in the proposed treatment and any reasonable alternative to the proposed treatment. I have also explained the risks and benefits involved in the refusal of the proposed treatment and have answered the patient's questions.                         Date:  ______/______/_______  Provider                      Signature:  __________________________________________________________       Time:  ___________ A.M    P.M.